# Patient Record
Sex: MALE | Race: WHITE | Employment: FULL TIME | URBAN - METROPOLITAN AREA
[De-identification: names, ages, dates, MRNs, and addresses within clinical notes are randomized per-mention and may not be internally consistent; named-entity substitution may affect disease eponyms.]

---

## 2017-02-08 ENCOUNTER — HOSPITAL ENCOUNTER (OUTPATIENT)
Dept: GENERAL RADIOLOGY | Age: 72
Discharge: HOME | End: 2017-02-08
Attending: INTERNAL MEDICINE
Payer: MEDICARE

## 2017-02-08 DIAGNOSIS — G89.29 CHRONIC BILATERAL LOW BACK PAIN WITHOUT SCIATICA: ICD-10-CM

## 2017-02-08 DIAGNOSIS — M54.50 CHRONIC BILATERAL LOW BACK PAIN WITHOUT SCIATICA: ICD-10-CM

## 2017-02-08 PROCEDURE — 72100 X-RAY EXAM L-S SPINE 2/3 VWS: CPT

## 2017-08-16 PROBLEM — R73.02 GLUCOSE INTOLERANCE (IMPAIRED GLUCOSE TOLERANCE): Status: ACTIVE | Noted: 2017-08-16

## 2020-03-02 ENCOUNTER — HOSPITAL ENCOUNTER (OUTPATIENT)
Dept: GENERAL RADIOLOGY | Age: 75
Discharge: HOME | End: 2020-03-02
Attending: INTERNAL MEDICINE
Payer: MEDICARE

## 2020-03-02 DIAGNOSIS — M54.2 NECK PAIN: ICD-10-CM

## 2020-03-02 PROCEDURE — 72050 X-RAY EXAM NECK SPINE 4/5VWS: CPT

## 2020-11-05 ENCOUNTER — HOSPITAL ENCOUNTER (OUTPATIENT)
Dept: NON INVASIVE DIAGNOSTICS | Age: 75
Discharge: HOME | End: 2020-11-05
Attending: PHYSICIAN ASSISTANT
Payer: MEDICARE

## 2020-11-05 DIAGNOSIS — R01.1 NEWLY RECOGNIZED MURMUR: ICD-10-CM

## 2020-11-05 DIAGNOSIS — R60.0 BILATERAL LEG EDEMA: ICD-10-CM

## 2020-11-05 LAB
ECHO AR MAX VEL PISA: 380.65 CM/S
ECHO AV AREA PEAK VELOCITY: 0.7 CM2
ECHO AV AREA PEAK VELOCITY: 49.46 CM2
ECHO AV AREA VTI: 0.66 CM2
ECHO AV MEAN GRADIENT: 43.42 MMHG
ECHO AV PEAK GRADIENT: 0.02 MMHG
ECHO AV PEAK GRADIENT: 76.73 MMHG
ECHO AV PEAK VELOCITY: 437.97 CM/S
ECHO AV PEAK VELOCITY: 6.16 CM/S
ECHO AV REGURGITANT PHT: 514.8 MS
ECHO AV VTI: 102.35 CM
ECHO LA AREA 4C: 22.47 CM2
ECHO LA VOL 4C: 59.49 ML (ref 18–58)
ECHO LV INTERNAL DIMENSION DIASTOLIC: 4.47 CM (ref 4.2–5.9)
ECHO LV INTERNAL DIMENSION SYSTOLIC: 4.34 CM
ECHO LV IVSD: 0.84 CM (ref 0.6–1)
ECHO LV MASS 2D: 127.5 G (ref 88–224)
ECHO LV POSTERIOR WALL DIASTOLIC: 0.92 CM (ref 0.6–1)
ECHO LVOT DIAM: 1.95 CM
ECHO LVOT PEAK GRADIENT: 4.15 MMHG
ECHO LVOT PEAK VELOCITY: 101.88 CM/S
ECHO LVOT SV: 67.4 ML
ECHO LVOT VTI: 22.51 CM
ECHO MV A VELOCITY: 80.95 CM/S
ECHO MV E VELOCITY: 76.8 CM/S
ECHO MV E/A RATIO: 0.95
ECHO RV TAPSE: 1.69 CM (ref 1.5–2)

## 2020-11-05 PROCEDURE — 93306 TTE W/DOPPLER COMPLETE: CPT

## 2021-03-08 PROBLEM — I35.0 NONRHEUMATIC AORTIC VALVE STENOSIS: Status: ACTIVE | Noted: 2021-03-08

## 2021-09-10 PROBLEM — E11.49 TYPE 2 DIABETES MELLITUS WITH NEUROLOGIC COMPLICATION, WITHOUT LONG-TERM CURRENT USE OF INSULIN (HCC): Status: ACTIVE | Noted: 2021-09-10

## 2021-09-10 PROBLEM — D68.69 SECONDARY HYPERCOAGULABLE STATE (HCC): Status: ACTIVE | Noted: 2021-09-10

## 2021-09-10 PROBLEM — R73.02 GLUCOSE INTOLERANCE (IMPAIRED GLUCOSE TOLERANCE): Status: RESOLVED | Noted: 2017-08-16 | Resolved: 2021-09-10

## 2021-09-10 PROBLEM — D69.2 SENILE PURPURA (HCC): Status: ACTIVE | Noted: 2021-09-10

## 2022-03-18 PROBLEM — D69.2 SENILE PURPURA (HCC): Status: ACTIVE | Noted: 2021-09-10

## 2022-03-19 PROBLEM — E11.49 TYPE 2 DIABETES MELLITUS WITH NEUROLOGIC COMPLICATION, WITHOUT LONG-TERM CURRENT USE OF INSULIN (HCC): Status: ACTIVE | Noted: 2021-09-10

## 2022-03-19 PROBLEM — I35.0 NONRHEUMATIC AORTIC VALVE STENOSIS: Status: ACTIVE | Noted: 2021-03-08

## 2022-03-20 PROBLEM — D68.69 SECONDARY HYPERCOAGULABLE STATE (HCC): Status: ACTIVE | Noted: 2021-09-10

## 2022-09-28 PROBLEM — I95.1 ORTHOSTASIS: Status: ACTIVE | Noted: 2022-09-28

## 2022-12-09 ENCOUNTER — HOSPITAL ENCOUNTER (OUTPATIENT)
Dept: INTERVENTIONAL RADIOLOGY/VASCULAR | Age: 77
Discharge: HOME | End: 2022-12-09
Attending: RADIOLOGY | Admitting: RADIOLOGY
Payer: MEDICARE

## 2022-12-09 VITALS
HEIGHT: 71 IN | SYSTOLIC BLOOD PRESSURE: 173 MMHG | TEMPERATURE: 98.2 F | RESPIRATION RATE: 14 BRPM | WEIGHT: 180 LBS | OXYGEN SATURATION: 95 % | DIASTOLIC BLOOD PRESSURE: 93 MMHG | BODY MASS INDEX: 25.2 KG/M2 | HEART RATE: 80 BPM

## 2022-12-09 DIAGNOSIS — T14.8XXA WOUND INFECTION: ICD-10-CM

## 2022-12-09 DIAGNOSIS — L08.9 WOUND INFECTION: ICD-10-CM

## 2022-12-09 PROCEDURE — C1751 CATH, INF, PER/CENT/MIDLINE: HCPCS

## 2022-12-09 PROCEDURE — 74011000250 HC RX REV CODE- 250: Performed by: RADIOLOGY

## 2022-12-09 PROCEDURE — 36573 INSJ PICC RS&I 5 YR+: CPT

## 2022-12-09 PROCEDURE — 2709999900 HC NON-CHARGEABLE SUPPLY

## 2022-12-09 RX ORDER — LIDOCAINE HYDROCHLORIDE 10 MG/ML
20 INJECTION INFILTRATION; PERINEURAL
Status: COMPLETED | OUTPATIENT
Start: 2022-12-09 | End: 2022-12-09

## 2022-12-09 RX ADMIN — LIDOCAINE HYDROCHLORIDE 1 ML: 10 INJECTION, SOLUTION INFILTRATION; PERINEURAL at 12:03

## 2022-12-09 NOTE — DISCHARGE INSTRUCTIONS
Side effects of sedation medications and other medications used today have been reviewed. Notify us of nausea, itching, hives, dizziness, or anything else out of the ordinary. Should you experience any of these significant changes, please call [phone] between the hours of 7:30 am and 10 pm or [phone] after hours. After hours, ask the  to page the X-ray Technologist, and describe the problem to the technologist.       Peripherally Inserted Central Catheter (PICC): Care Instructions  Overview     A peripherally inserted central catheter (PICC) is a soft, flexible tube that runs under your skin from a vein in your arm to a large vein near your heart. One end of the catheter stays outside your body. It is a type of central vascular access device, or central line. You may have it for weeks or months. A PICC is used to give you medicine, blood products, nutrients, or fluids. A PICC makes doing these things more comfortable for you because they are put directly into the catheter. So you will not be stuck with a needle every time. A PICC may be used to draw blood for tests only if another vein, such as in the hand or arm, can't be used. The end of the PICC sometimes has two or three openings so that you can get more than one type of fluid or medicine at a time. Your doctor may give you medicine to make you feel relaxed. You may feel a little pain when your doctor numbs your arm. Your doctor will then thread the catheter up a vein in your arm to a larger vein. You will not feel any pain. The doctor may use stitches or other devices to hold the catheter in place where it exits your arm. After the procedure, the site may be sore for a day or two. Follow-up care is a key part of your treatment and safety. Be sure to make and go to all appointments, and call your doctor if you are having problems. It's also a good idea to know your test results and keep a list of the medicines you take.   How can you care for yourself at home? Do not wear jewelry, such as necklaces, that can catch on the catheter. If the catheter breaks, follow the instructions your doctor gave you. If you have no instructions, clamp or tie off the catheter. Then see a doctor as soon as possible. To help prevent infection, take a shower instead of a bath. Do not go swimming with the catheter. Try to keep the area dry. When you shower, cover the area with waterproof material, such as plastic wrap. Never touch the open end of the catheter if the cap is off. Never use scissors, knives, pins, or other sharp objects near the catheter or other tubing. If your catheter has a clamp, keep it clamped when you are not using it. Fasten or tape the catheter to your body to prevent pulling or dangling. Avoid clothing that rubs or pulls on your catheter. Avoid bending or crimping your catheter. Always wash your hands before you touch your catheter. Wear loose clothing over the catheter for the first 10 to 14 days. When getting dressed, be careful not to pull on the catheter. How to change the dressing  Since the PICC is in one of your arms, you won't be able to change the dressing on your own. You'll need someone to help you change the dressing using the same instructions that your doctor or nurse gave you. Your PICC dressing should be changed at least once a week. If the dressing gets loose, wet, or dirty, it must be changed more often to prevent infection. Your doctor may also give you instructions for when to change the dressing. Be sure you have all your supplies ready. These include medical tape, a surgical mask, sterile gloves, and your dressing kit. The names and brands of the items will vary. Your doctor or nurse may give you specific instructions for changing the dressing. Here are basic tips for how to change the dressing. Wash your hands with soap and water. Do this for 15 seconds. Dry them with paper towels.   Put on the surgical mask.  Loosen and remove your old dressing. Peel it toward the PICC, not away from it. You may need to use an adhesive remover if it doesn't come off easily. Look at the site carefully for redness, swelling, drainage, tenderness, or warmth. If you notice any of these, call your doctor. Wash your hands again. Open your dressing kit, and put on the sterile gloves. Clean the site with the supplies in the dressing kit. Use the dressing that your doctor gave you, and place it over the site. Tape the PICC tubing to your skin. Make sure it doesn't dangle or pull. When should you call for help? Call 911 anytime you think you may need emergency care. For example, call if:    You passed out (lost consciousness). You have severe trouble breathing. You have sudden chest pain and shortness of breath, or you cough up blood. You have a fast or uneven pulse. Call your doctor now or seek immediate medical care if:    You have signs of infection, such as: Increased pain, swelling, warmth, or redness. Red streaks leading from the area. Pus or blood draining from the area. A fever. You have swelling in your face, chest, neck, or arm on the side where the catheter is. You have signs of a blood clot, such as bulging veins near the catheter. Your catheter is leaking, cracked, or clogged. You feel resistance when you inject medicine or fluids into your catheter. Your catheter is out of place. This may happen after severe coughing or vomiting, or if you pull on the catheter. You have chest pain or shortness of breath. Watch closely for changes in your health, and be sure to contact your doctor if:    You have any concerns about your catheter. Where can you learn more? Go to http://www.gray.com/  Enter L935 in the search box to learn more about \"Peripherally Inserted Central Catheter (PICC): Care Instructions. \"  Current as of: April 5, 2022 Content Version: 13.4  © 3466-2155 [employer]. Care instructions adapted under license by [employer] (which disclaims liability or warranty for this information). If you have questions about a medical condition or this instruction, always ask your healthcare professional. [address] any warranty or liability for your use of this information.

## 2022-12-09 NOTE — ROUTINE PROCESS
Pt arrives ambulatory to angio department accompanied by son for PICC procedure. All assessments completed and consent was reviewed. Education given was regarding procedure,  post-procedure care and  management/follow-up. Opportunity for questions was provided and all questions and concerns were addressed.

## 2022-12-09 NOTE — PROGRESS NOTES
1115 Assumed care of patient in Angio from Andreia Culver, [address] Rosendo Low placed a 5 fr 39 cm Power PICC in right upper arm. Patient ambulates to bathroom and dresses self without assistance. Stockinette applied and extra tegaderm , stockinette, strip of PureHubs provided. Reviewed discharge instructions and copy given. Instructed to follow up with Dr Mello's office for [address] [facility] dressing changes. Transported to discharge area for son to drive home.

## 2023-05-17 RX ORDER — ROSUVASTATIN CALCIUM 10 MG/1
1 TABLET, COATED ORAL NIGHTLY
Status: ON HOLD | COMMUNITY
Start: 2022-06-07

## 2023-05-17 RX ORDER — FUROSEMIDE 20 MG/1
1 TABLET ORAL DAILY
COMMUNITY
Start: 2021-05-31 | End: 2023-06-15

## 2023-05-17 RX ORDER — TRIAMCINOLONE ACETONIDE 1 MG/G
CREAM TOPICAL 2 TIMES DAILY PRN
COMMUNITY
Start: 2022-12-27 | End: 2023-07-18

## 2023-05-17 RX ORDER — NORTRIPTYLINE HYDROCHLORIDE 50 MG/1
1 CAPSULE ORAL NIGHTLY
Status: ON HOLD | COMMUNITY
Start: 2023-01-24

## 2023-05-17 RX ORDER — GABAPENTIN 600 MG/1
TABLET ORAL
Status: ON HOLD | COMMUNITY
Start: 2023-02-19

## 2023-05-17 RX ORDER — VITAMIN B COMPLEX
2500 TABLET ORAL DAILY
Status: ON HOLD | COMMUNITY

## 2023-05-17 RX ORDER — METOPROLOL SUCCINATE 50 MG/1
1 TABLET, EXTENDED RELEASE ORAL DAILY
Status: ON HOLD | COMMUNITY
Start: 2022-01-31

## 2023-05-17 RX ORDER — POTASSIUM CHLORIDE 750 MG/1
1 TABLET, EXTENDED RELEASE ORAL DAILY
COMMUNITY
Start: 2022-11-01 | End: 2023-06-15 | Stop reason: ALTCHOICE

## 2023-05-17 RX ORDER — ALLOPURINOL 100 MG/1
100 TABLET ORAL DAILY
Status: ON HOLD | COMMUNITY
Start: 2023-03-26

## 2023-05-17 RX ORDER — MONTELUKAST SODIUM 10 MG/1
TABLET ORAL
Status: ON HOLD | COMMUNITY
Start: 2022-10-13

## 2023-07-18 ENCOUNTER — HOSPITAL ENCOUNTER (OUTPATIENT)
Facility: HOSPITAL | Age: 78
Discharge: HOME | End: 2023-07-21
Payer: MEDICARE

## 2023-07-18 VITALS
WEIGHT: 202 LBS | SYSTOLIC BLOOD PRESSURE: 139 MMHG | BODY MASS INDEX: 28.28 KG/M2 | OXYGEN SATURATION: 96 % | DIASTOLIC BLOOD PRESSURE: 66 MMHG | TEMPERATURE: 97.1 F | RESPIRATION RATE: 16 BRPM | HEART RATE: 111 BPM | HEIGHT: 71 IN

## 2023-07-18 LAB
ANION GAP SERPL CALC-SCNC: 5 MMOL/L (ref 5–15)
BASOPHILS # BLD: 0.1 K/UL (ref 0–0.1)
BASOPHILS NFR BLD: 1 % (ref 0–1)
BUN SERPL-MCNC: 17 MG/DL (ref 6–20)
BUN/CREAT SERPL: 20 (ref 12–20)
CALCIUM SERPL-MCNC: 9.8 MG/DL (ref 8.5–10.1)
CHLORIDE SERPL-SCNC: 101 MMOL/L (ref 97–108)
CO2 SERPL-SCNC: 33 MMOL/L (ref 21–32)
CREAT SERPL-MCNC: 0.84 MG/DL (ref 0.7–1.3)
DIFFERENTIAL METHOD BLD: ABNORMAL
EKG ATRIAL RATE: 101 BPM
EKG DIAGNOSIS: NORMAL
EKG P AXIS: 52 DEGREES
EKG P-R INTERVAL: 156 MS
EKG Q-T INTERVAL: 340 MS
EKG QRS DURATION: 84 MS
EKG QTC CALCULATION (BAZETT): 440 MS
EKG R AXIS: 59 DEGREES
EKG T AXIS: 71 DEGREES
EKG VENTRICULAR RATE: 101 BPM
EOSINOPHIL # BLD: 0.1 K/UL (ref 0–0.4)
EOSINOPHIL NFR BLD: 1 % (ref 0–7)
ERYTHROCYTE [DISTWIDTH] IN BLOOD BY AUTOMATED COUNT: 13.4 % (ref 11.5–14.5)
GLUCOSE SERPL-MCNC: 136 MG/DL (ref 65–100)
HCT VFR BLD AUTO: 42.9 % (ref 36.6–50.3)
HGB BLD-MCNC: 13.5 G/DL (ref 12.1–17)
IMM GRANULOCYTES # BLD AUTO: 0.1 K/UL (ref 0–0.04)
IMM GRANULOCYTES NFR BLD AUTO: 1 % (ref 0–0.5)
INR PPP: 1.2 (ref 0.9–1.1)
LYMPHOCYTES # BLD: 1.4 K/UL (ref 0.8–3.5)
LYMPHOCYTES NFR BLD: 14 % (ref 12–49)
MCH RBC QN AUTO: 30.3 PG (ref 26–34)
MCHC RBC AUTO-ENTMCNC: 31.5 G/DL (ref 30–36.5)
MCV RBC AUTO: 96.4 FL (ref 80–99)
MONOCYTES # BLD: 1.3 K/UL (ref 0–1)
MONOCYTES NFR BLD: 13 % (ref 5–13)
NEUTS SEG # BLD: 7 K/UL (ref 1.8–8)
NEUTS SEG NFR BLD: 70 % (ref 32–75)
NRBC # BLD: 0 K/UL (ref 0–0.01)
NRBC BLD-RTO: 0 PER 100 WBC
PLATELET # BLD AUTO: 241 K/UL (ref 150–400)
PMV BLD AUTO: 10.9 FL (ref 8.9–12.9)
POTASSIUM SERPL-SCNC: 4 MMOL/L (ref 3.5–5.1)
PROTHROMBIN TIME: 12.1 SEC (ref 9–11.1)
RBC # BLD AUTO: 4.45 M/UL (ref 4.1–5.7)
SODIUM SERPL-SCNC: 139 MMOL/L (ref 136–145)
WBC # BLD AUTO: 9.9 K/UL (ref 4.1–11.1)

## 2023-07-18 PROCEDURE — 36415 COLL VENOUS BLD VENIPUNCTURE: CPT

## 2023-07-18 PROCEDURE — 85610 PROTHROMBIN TIME: CPT

## 2023-07-18 PROCEDURE — 93010 ELECTROCARDIOGRAM REPORT: CPT | Performed by: SPECIALIST

## 2023-07-18 PROCEDURE — 93005 ELECTROCARDIOGRAM TRACING: CPT | Performed by: ORTHOPAEDIC SURGERY

## 2023-07-18 PROCEDURE — 80048 BASIC METABOLIC PNL TOTAL CA: CPT

## 2023-07-18 PROCEDURE — 85025 COMPLETE CBC W/AUTO DIFF WBC: CPT

## 2023-07-18 RX ORDER — THIAMINE MONONITRATE (VIT B1) 100 MG
100 TABLET ORAL DAILY
Status: ON HOLD | COMMUNITY

## 2023-07-18 RX ORDER — M-VIT,TX,IRON,MINS/CALC/FOLIC 27MG-0.4MG
1 TABLET ORAL DAILY
Status: ON HOLD | COMMUNITY

## 2023-07-18 RX ORDER — ASPIRIN 81 MG/1
81 TABLET ORAL DAILY
Status: ON HOLD | COMMUNITY

## 2023-07-18 NOTE — PERIOP NOTE
Pt has been given instructions for Xarelto and Aspirin per PCP and Dr. Stacey Balbuena. Pt states he takes Metoprolol for tachycardia, and has been taking it for a while. Pt and wife deny hx of diabetes.

## 2023-07-20 ENCOUNTER — ANESTHESIA (OUTPATIENT)
Facility: HOSPITAL | Age: 78
End: 2023-07-20
Payer: MEDICARE

## 2023-07-20 ENCOUNTER — ANESTHESIA EVENT (OUTPATIENT)
Facility: HOSPITAL | Age: 78
End: 2023-07-20
Payer: MEDICARE

## 2023-07-20 ENCOUNTER — APPOINTMENT (OUTPATIENT)
Facility: HOSPITAL | Age: 78
DRG: 492 | End: 2023-07-20
Attending: ORTHOPAEDIC SURGERY
Payer: MEDICARE

## 2023-07-20 ENCOUNTER — HOSPITAL ENCOUNTER (INPATIENT)
Facility: HOSPITAL | Age: 78
LOS: 5 days | Discharge: HOME | DRG: 492 | End: 2023-07-25
Attending: ORTHOPAEDIC SURGERY | Admitting: ORTHOPAEDIC SURGERY
Payer: MEDICARE

## 2023-07-20 DIAGNOSIS — S82.891D ANKLE FRACTURE, RIGHT, CLOSED, WITH ROUTINE HEALING, SUBSEQUENT ENCOUNTER: ICD-10-CM

## 2023-07-20 DIAGNOSIS — I38 CARDIAC VALVULOPATHY: Primary | ICD-10-CM

## 2023-07-20 PROCEDURE — 6360000002 HC RX W HCPCS: Performed by: ANESTHESIOLOGY

## 2023-07-20 PROCEDURE — 2709999900 HC NON-CHARGEABLE SUPPLY: Performed by: ORTHOPAEDIC SURGERY

## 2023-07-20 PROCEDURE — 1100000000 HC RM PRIVATE

## 2023-07-20 PROCEDURE — 6360000002 HC RX W HCPCS: Performed by: ORTHOPAEDIC SURGERY

## 2023-07-20 PROCEDURE — 2720000010 HC SURG SUPPLY STERILE: Performed by: ORTHOPAEDIC SURGERY

## 2023-07-20 PROCEDURE — 2500000003 HC RX 250 WO HCPCS: Performed by: NURSE ANESTHETIST, CERTIFIED REGISTERED

## 2023-07-20 PROCEDURE — HZ2ZZZZ DETOXIFICATION SERVICES FOR SUBSTANCE ABUSE TREATMENT: ICD-10-PCS | Performed by: ORTHOPAEDIC SURGERY

## 2023-07-20 PROCEDURE — 3700000000 HC ANESTHESIA ATTENDED CARE: Performed by: ORTHOPAEDIC SURGERY

## 2023-07-20 PROCEDURE — 6360000002 HC RX W HCPCS: Performed by: NURSE ANESTHETIST, CERTIFIED REGISTERED

## 2023-07-20 PROCEDURE — 2580000003 HC RX 258: Performed by: ORTHOPAEDIC SURGERY

## 2023-07-20 PROCEDURE — C1713 ANCHOR/SCREW BN/BN,TIS/BN: HCPCS | Performed by: ORTHOPAEDIC SURGERY

## 2023-07-20 PROCEDURE — 64447 NJX AA&/STRD FEMORAL NRV IMG: CPT | Performed by: ANESTHESIOLOGY

## 2023-07-20 PROCEDURE — 0QSJ04Z REPOSITION RIGHT FIBULA WITH INTERNAL FIXATION DEVICE, OPEN APPROACH: ICD-10-PCS | Performed by: ORTHOPAEDIC SURGERY

## 2023-07-20 PROCEDURE — 3700000001 HC ADD 15 MINUTES (ANESTHESIA): Performed by: ORTHOPAEDIC SURGERY

## 2023-07-20 PROCEDURE — 6370000000 HC RX 637 (ALT 250 FOR IP): Performed by: ORTHOPAEDIC SURGERY

## 2023-07-20 PROCEDURE — 7100000001 HC PACU RECOVERY - ADDTL 15 MIN: Performed by: ORTHOPAEDIC SURGERY

## 2023-07-20 PROCEDURE — 3600000014 HC SURGERY LEVEL 4 ADDTL 15MIN: Performed by: ORTHOPAEDIC SURGERY

## 2023-07-20 PROCEDURE — 2500000003 HC RX 250 WO HCPCS: Performed by: ORTHOPAEDIC SURGERY

## 2023-07-20 PROCEDURE — 2580000003 HC RX 258: Performed by: ANESTHESIOLOGY

## 2023-07-20 PROCEDURE — 0SSF04Z REPOSITION RIGHT ANKLE JOINT WITH INTERNAL FIXATION DEVICE, OPEN APPROACH: ICD-10-PCS | Performed by: ORTHOPAEDIC SURGERY

## 2023-07-20 PROCEDURE — 7100000000 HC PACU RECOVERY - FIRST 15 MIN: Performed by: ORTHOPAEDIC SURGERY

## 2023-07-20 PROCEDURE — 3600000004 HC SURGERY LEVEL 4 BASE: Performed by: ORTHOPAEDIC SURGERY

## 2023-07-20 DEVICE — DISTAL LATERAL FIBULA PLATE, 6 HOLE
Type: IMPLANTABLE DEVICE | Site: ANKLE | Status: FUNCTIONAL
Brand: VARIAX

## 2023-07-20 DEVICE — LOCKING SCREW
Type: IMPLANTABLE DEVICE | Site: ANKLE | Status: FUNCTIONAL
Brand: VARIAX

## 2023-07-20 DEVICE — ALLOGRAFT BNE 10CC MTRX VIABLE BIO4: Type: IMPLANTABLE DEVICE | Site: ANKLE | Status: FUNCTIONAL

## 2023-07-20 DEVICE — KIT REP TI KNOTLESS SYNDESMOSIS CANN 3.7MM DRL BIT DRL GUID: Type: IMPLANTABLE DEVICE | Site: ANKLE | Status: FUNCTIONAL

## 2023-07-20 DEVICE — BONE SCREW
Type: IMPLANTABLE DEVICE | Site: ANKLE | Status: FUNCTIONAL
Brand: VARIAX

## 2023-07-20 RX ORDER — LIDOCAINE HYDROCHLORIDE 10 MG/ML
1 INJECTION, SOLUTION EPIDURAL; INFILTRATION; INTRACAUDAL; PERINEURAL
Status: DISCONTINUED | OUTPATIENT
Start: 2023-07-20 | End: 2023-07-20 | Stop reason: HOSPADM

## 2023-07-20 RX ORDER — MORPHINE SULFATE 2 MG/ML
2 INJECTION, SOLUTION INTRAMUSCULAR; INTRAVENOUS
Status: DISCONTINUED | OUTPATIENT
Start: 2023-07-20 | End: 2023-07-22

## 2023-07-20 RX ORDER — FENTANYL CITRATE 50 UG/ML
INJECTION, SOLUTION INTRAMUSCULAR; INTRAVENOUS PRN
Status: DISCONTINUED | OUTPATIENT
Start: 2023-07-20 | End: 2023-07-20 | Stop reason: SDUPTHER

## 2023-07-20 RX ORDER — SODIUM CHLORIDE, SODIUM LACTATE, POTASSIUM CHLORIDE, CALCIUM CHLORIDE 600; 310; 30; 20 MG/100ML; MG/100ML; MG/100ML; MG/100ML
INJECTION, SOLUTION INTRAVENOUS CONTINUOUS
Status: DISCONTINUED | OUTPATIENT
Start: 2023-07-20 | End: 2023-07-21

## 2023-07-20 RX ORDER — MONTELUKAST SODIUM 10 MG/1
10 TABLET ORAL NIGHTLY
Status: DISCONTINUED | OUTPATIENT
Start: 2023-07-20 | End: 2023-07-25 | Stop reason: HOSPADM

## 2023-07-20 RX ORDER — DIPHENHYDRAMINE HYDROCHLORIDE 50 MG/ML
12.5 INJECTION INTRAMUSCULAR; INTRAVENOUS
Status: COMPLETED | OUTPATIENT
Start: 2023-07-20 | End: 2023-07-21

## 2023-07-20 RX ORDER — M-VIT,TX,IRON,MINS/CALC/FOLIC 27MG-0.4MG
1 TABLET ORAL DAILY
Status: DISCONTINUED | OUTPATIENT
Start: 2023-07-20 | End: 2023-07-20 | Stop reason: SDUPTHER

## 2023-07-20 RX ORDER — GABAPENTIN 300 MG/1
300 CAPSULE ORAL ONCE
Status: COMPLETED | OUTPATIENT
Start: 2023-07-20 | End: 2023-07-20

## 2023-07-20 RX ORDER — NORTRIPTYLINE HYDROCHLORIDE 25 MG/1
50 CAPSULE ORAL NIGHTLY
Status: DISCONTINUED | OUTPATIENT
Start: 2023-07-20 | End: 2023-07-25 | Stop reason: HOSPADM

## 2023-07-20 RX ORDER — GABAPENTIN 300 MG/1
600 CAPSULE ORAL 3 TIMES DAILY
Status: DISCONTINUED | OUTPATIENT
Start: 2023-07-20 | End: 2023-07-25 | Stop reason: HOSPADM

## 2023-07-20 RX ORDER — ONDANSETRON 2 MG/ML
4 INJECTION INTRAMUSCULAR; INTRAVENOUS EVERY 6 HOURS PRN
Status: DISCONTINUED | OUTPATIENT
Start: 2023-07-20 | End: 2023-07-25 | Stop reason: HOSPADM

## 2023-07-20 RX ORDER — BISACODYL 5 MG/1
5 TABLET, DELAYED RELEASE ORAL DAILY PRN
Status: DISCONTINUED | OUTPATIENT
Start: 2023-07-20 | End: 2023-07-25 | Stop reason: HOSPADM

## 2023-07-20 RX ORDER — FENTANYL CITRATE 50 UG/ML
100 INJECTION, SOLUTION INTRAMUSCULAR; INTRAVENOUS
Status: DISCONTINUED | OUTPATIENT
Start: 2023-07-20 | End: 2023-07-20 | Stop reason: HOSPADM

## 2023-07-20 RX ORDER — MORPHINE SULFATE 4 MG/ML
4 INJECTION, SOLUTION INTRAMUSCULAR; INTRAVENOUS
Status: DISCONTINUED | OUTPATIENT
Start: 2023-07-20 | End: 2023-07-22

## 2023-07-20 RX ORDER — METOPROLOL SUCCINATE 50 MG/1
50 TABLET, EXTENDED RELEASE ORAL DAILY
Status: DISCONTINUED | OUTPATIENT
Start: 2023-07-21 | End: 2023-07-22

## 2023-07-20 RX ORDER — SODIUM CHLORIDE 0.9 % (FLUSH) 0.9 %
5-40 SYRINGE (ML) INJECTION PRN
Status: DISCONTINUED | OUTPATIENT
Start: 2023-07-20 | End: 2023-07-25 | Stop reason: HOSPADM

## 2023-07-20 RX ORDER — MULTIVIT/IRON SULF/FOLIC ACID 15MG-0.4MG
1 TABLET ORAL DAILY
Status: DISCONTINUED | OUTPATIENT
Start: 2023-07-20 | End: 2023-07-25 | Stop reason: HOSPADM

## 2023-07-20 RX ORDER — SODIUM CHLORIDE, SODIUM LACTATE, POTASSIUM CHLORIDE, CALCIUM CHLORIDE 600; 310; 30; 20 MG/100ML; MG/100ML; MG/100ML; MG/100ML
INJECTION, SOLUTION INTRAVENOUS CONTINUOUS
Status: DISCONTINUED | OUTPATIENT
Start: 2023-07-20 | End: 2023-07-20 | Stop reason: HOSPADM

## 2023-07-20 RX ORDER — ROSUVASTATIN CALCIUM 10 MG/1
10 TABLET, COATED ORAL NIGHTLY
Status: DISCONTINUED | OUTPATIENT
Start: 2023-07-20 | End: 2023-07-25 | Stop reason: HOSPADM

## 2023-07-20 RX ORDER — SODIUM CHLORIDE 9 MG/ML
INJECTION, SOLUTION INTRAVENOUS PRN
Status: DISCONTINUED | OUTPATIENT
Start: 2023-07-20 | End: 2023-07-25 | Stop reason: HOSPADM

## 2023-07-20 RX ORDER — PROPOFOL 10 MG/ML
INJECTION, EMULSION INTRAVENOUS CONTINUOUS PRN
Status: DISCONTINUED | OUTPATIENT
Start: 2023-07-20 | End: 2023-07-20 | Stop reason: SDUPTHER

## 2023-07-20 RX ORDER — ALLOPURINOL 100 MG/1
100 TABLET ORAL DAILY
Status: DISCONTINUED | OUTPATIENT
Start: 2023-07-21 | End: 2023-07-25 | Stop reason: HOSPADM

## 2023-07-20 RX ORDER — GAUZE BANDAGE 2" X 2"
100 BANDAGE TOPICAL DAILY
Status: DISCONTINUED | OUTPATIENT
Start: 2023-07-21 | End: 2023-07-25 | Stop reason: HOSPADM

## 2023-07-20 RX ORDER — CHOLECALCIFEROL (VITAMIN D3) 125 MCG
2500 CAPSULE ORAL DAILY
Status: DISCONTINUED | OUTPATIENT
Start: 2023-07-20 | End: 2023-07-25 | Stop reason: HOSPADM

## 2023-07-20 RX ORDER — SODIUM CHLORIDE 0.9 % (FLUSH) 0.9 %
5-40 SYRINGE (ML) INJECTION EVERY 12 HOURS SCHEDULED
Status: DISCONTINUED | OUTPATIENT
Start: 2023-07-20 | End: 2023-07-25 | Stop reason: HOSPADM

## 2023-07-20 RX ORDER — ONDANSETRON 2 MG/ML
4 INJECTION INTRAMUSCULAR; INTRAVENOUS
Status: DISCONTINUED | OUTPATIENT
Start: 2023-07-20 | End: 2023-07-21 | Stop reason: HOSPADM

## 2023-07-20 RX ORDER — PHENYLEPHRINE HCL IN 0.9% NACL 0.4MG/10ML
SYRINGE (ML) INTRAVENOUS PRN
Status: DISCONTINUED | OUTPATIENT
Start: 2023-07-20 | End: 2023-07-20 | Stop reason: SDUPTHER

## 2023-07-20 RX ORDER — POTASSIUM CHLORIDE 750 MG/1
10 TABLET, FILM COATED, EXTENDED RELEASE ORAL DAILY
Status: DISCONTINUED | OUTPATIENT
Start: 2023-07-21 | End: 2023-07-25 | Stop reason: HOSPADM

## 2023-07-20 RX ORDER — DEXMEDETOMIDINE HYDROCHLORIDE 100 UG/ML
INJECTION, SOLUTION INTRAVENOUS PRN
Status: DISCONTINUED | OUTPATIENT
Start: 2023-07-20 | End: 2023-07-20 | Stop reason: SDUPTHER

## 2023-07-20 RX ORDER — ACETAMINOPHEN 325 MG/1
650 TABLET ORAL EVERY 4 HOURS PRN
Status: DISCONTINUED | OUTPATIENT
Start: 2023-07-20 | End: 2023-07-25 | Stop reason: HOSPADM

## 2023-07-20 RX ORDER — ASPIRIN 81 MG/1
81 TABLET ORAL DAILY
Status: DISCONTINUED | OUTPATIENT
Start: 2023-07-20 | End: 2023-07-25 | Stop reason: HOSPADM

## 2023-07-20 RX ORDER — OXYCODONE HYDROCHLORIDE 5 MG/1
5 TABLET ORAL EVERY 4 HOURS PRN
Status: DISCONTINUED | OUTPATIENT
Start: 2023-07-20 | End: 2023-07-22

## 2023-07-20 RX ORDER — BISACODYL 10 MG
10 SUPPOSITORY, RECTAL RECTAL DAILY PRN
Status: DISCONTINUED | OUTPATIENT
Start: 2023-07-20 | End: 2023-07-25 | Stop reason: HOSPADM

## 2023-07-20 RX ORDER — ACETAMINOPHEN 325 MG/1
650 TABLET ORAL ONCE
Status: COMPLETED | OUTPATIENT
Start: 2023-07-20 | End: 2023-07-20

## 2023-07-20 RX ORDER — FUROSEMIDE 20 MG/1
20 TABLET ORAL DAILY
Status: DISCONTINUED | OUTPATIENT
Start: 2023-07-21 | End: 2023-07-21

## 2023-07-20 RX ORDER — MIDAZOLAM HYDROCHLORIDE 2 MG/2ML
2 INJECTION, SOLUTION INTRAMUSCULAR; INTRAVENOUS
Status: DISCONTINUED | OUTPATIENT
Start: 2023-07-20 | End: 2023-07-20 | Stop reason: HOSPADM

## 2023-07-20 RX ORDER — MIDAZOLAM HYDROCHLORIDE 1 MG/ML
INJECTION INTRAMUSCULAR; INTRAVENOUS PRN
Status: DISCONTINUED | OUTPATIENT
Start: 2023-07-20 | End: 2023-07-20 | Stop reason: SDUPTHER

## 2023-07-20 RX ADMIN — FENTANYL CITRATE 50 MCG: 50 INJECTION, SOLUTION INTRAMUSCULAR; INTRAVENOUS at 10:35

## 2023-07-20 RX ADMIN — DEXMEDETOMIDINE 10 MCG: 100 INJECTION, SOLUTION INTRAVENOUS at 12:08

## 2023-07-20 RX ADMIN — NORTRIPTYLINE HYDROCHLORIDE 50 MG: 25 CAPSULE ORAL at 21:45

## 2023-07-20 RX ADMIN — MONTELUKAST 10 MG: 10 TABLET, FILM COATED ORAL at 21:47

## 2023-07-20 RX ADMIN — ROSUVASTATIN CALCIUM 10 MG: 10 TABLET, FILM COATED ORAL at 21:44

## 2023-07-20 RX ADMIN — GABAPENTIN 300 MG: 300 CAPSULE ORAL at 10:28

## 2023-07-20 RX ADMIN — ROPIVACAINE HYDROCHLORIDE 30 ML: 2 INJECTION, SOLUTION EPIDURAL; INFILTRATION at 10:40

## 2023-07-20 RX ADMIN — WATER 2000 MG: 1 INJECTION INTRAMUSCULAR; INTRAVENOUS; SUBCUTANEOUS at 21:18

## 2023-07-20 RX ADMIN — CEFAZOLIN SODIUM 2000 MG: 1 POWDER, FOR SOLUTION INTRAMUSCULAR; INTRAVENOUS at 12:10

## 2023-07-20 RX ADMIN — SODIUM CHLORIDE, POTASSIUM CHLORIDE, SODIUM LACTATE AND CALCIUM CHLORIDE: 600; 310; 30; 20 INJECTION, SOLUTION INTRAVENOUS at 10:29

## 2023-07-20 RX ADMIN — Medication 100 MCG: at 13:09

## 2023-07-20 RX ADMIN — PROPOFOL 125 MCG/KG/MIN: 10 INJECTION, EMULSION INTRAVENOUS at 12:03

## 2023-07-20 RX ADMIN — SODIUM CHLORIDE, POTASSIUM CHLORIDE, SODIUM LACTATE AND CALCIUM CHLORIDE: 600; 310; 30; 20 INJECTION, SOLUTION INTRAVENOUS at 15:01

## 2023-07-20 RX ADMIN — GABAPENTIN 600 MG: 300 CAPSULE ORAL at 19:04

## 2023-07-20 RX ADMIN — ACETAMINOPHEN 650 MG: 325 TABLET ORAL at 10:28

## 2023-07-20 RX ADMIN — Medication 1 TABLET: at 21:45

## 2023-07-20 RX ADMIN — CYANOCOBALAMIN TAB 500 MCG 2500 MCG: 500 TAB at 21:47

## 2023-07-20 RX ADMIN — FENTANYL CITRATE 50 MCG: 50 INJECTION, SOLUTION INTRAMUSCULAR; INTRAVENOUS at 12:03

## 2023-07-20 RX ADMIN — ACETAMINOPHEN 650 MG: 325 TABLET ORAL at 19:02

## 2023-07-20 RX ADMIN — SODIUM CHLORIDE, PRESERVATIVE FREE 10 ML: 5 INJECTION INTRAVENOUS at 21:48

## 2023-07-20 RX ADMIN — ROPIVACAINE HYDROCHLORIDE 10 ML: 2 INJECTION, SOLUTION EPIDURAL; INFILTRATION at 10:45

## 2023-07-20 RX ADMIN — Medication 100 MCG: at 12:48

## 2023-07-20 RX ADMIN — Medication 100 MCG: at 13:15

## 2023-07-20 RX ADMIN — MIDAZOLAM HYDROCHLORIDE 2 MG: 1 INJECTION, SOLUTION INTRAMUSCULAR; INTRAVENOUS at 10:35

## 2023-07-20 RX ADMIN — FENTANYL CITRATE 50 MCG: 50 INJECTION, SOLUTION INTRAMUSCULAR; INTRAVENOUS at 10:40

## 2023-07-20 ASSESSMENT — PAIN SCALES - GENERAL
PAINLEVEL_OUTOF10: 0
PAINLEVEL_OUTOF10: 0
PAINLEVEL_OUTOF10: 4
PAINLEVEL_OUTOF10: 2

## 2023-07-20 ASSESSMENT — PAIN DESCRIPTION - LOCATION: LOCATION: ANKLE

## 2023-07-20 ASSESSMENT — PAIN DESCRIPTION - ORIENTATION: ORIENTATION: RIGHT

## 2023-07-20 ASSESSMENT — PAIN - FUNCTIONAL ASSESSMENT
PAIN_FUNCTIONAL_ASSESSMENT: 0-10
PAIN_FUNCTIONAL_ASSESSMENT: ACTIVITIES ARE NOT PREVENTED

## 2023-07-20 ASSESSMENT — PAIN DESCRIPTION - DESCRIPTORS: DESCRIPTORS: ACHING;SORE
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
## 2023-07-20 NOTE — PERIOP NOTE
Patient expressed need to defecate,  he declined the use of a bed pan, patient advised against  weight bearing on the right lower extremity. Patient assisted to bedside commode, was able to pass stools and patient assisted back to bed with help.

## 2023-07-20 NOTE — H&P
The patient's History and Physical within 30 days of today's date  was reviewed with the patient and I examined the patient. There was no change. The surgical site was confirmed by the patient and me. Plan: The risks, benefits, expected outcome, and alternative to the recommended procedure have been discussed with the patient. Patient understands and wants to proceed with the procedure.  Full paper H&P on the chart    Electronically signed by Mushtaq Dai MD on 7/20/2023 at 10:41 AM
EMT/paramedic

## 2023-07-20 NOTE — ANESTHESIA PRE PROCEDURE
Counseling given: Not Answered      Vital Signs (Current): There were no vitals filed for this visit. BP Readings from Last 3 Encounters:   07/18/23 139/66   07/17/23 120/65   06/20/23 (!) 100/50       NPO Status:                                                                                 BMI:   Wt Readings from Last 3 Encounters:   07/18/23 91.6 kg (202 lb)   05/12/23 86.2 kg (190 lb)   03/21/23 88.5 kg (195 lb)     There is no height or weight on file to calculate BMI.    CBC:   Lab Results   Component Value Date/Time    WBC 9.9 07/18/2023 10:57 AM    RBC 4.45 07/18/2023 10:57 AM    HGB 13.5 07/18/2023 10:57 AM    HCT 42.9 07/18/2023 10:57 AM    MCV 96.4 07/18/2023 10:57 AM    MCV 96.3 08/25/2022 11:29 AM    RDW 13.4 07/18/2023 10:57 AM     07/18/2023 10:57 AM       CMP:   Lab Results   Component Value Date/Time     07/18/2023 10:57 AM    K 4.0 07/18/2023 10:57 AM     07/18/2023 10:57 AM    CO2 33 07/18/2023 10:57 AM    BUN 17 07/18/2023 10:57 AM    CREATININE 0.84 07/18/2023 10:57 AM    GFRAA 103 09/10/2021 10:38 AM    AGRATIO 1.7 06/15/2023 04:23 PM    LABGLOM >60 07/18/2023 10:57 AM    LABGLOM 71 06/15/2023 04:23 PM    GLUCOSE 136 07/18/2023 10:57 AM    GLUCOSE 148 06/15/2023 04:23 PM    PROT 6.0 06/15/2023 04:23 PM    CALCIUM 9.8 07/18/2023 10:57 AM    BILITOT 0.9 06/15/2023 04:23 PM    ALKPHOS 68 06/15/2023 04:23 PM    AST 52 06/15/2023 04:23 PM    ALT 46 06/15/2023 04:23 PM       POC Tests: No results for input(s): POCGLU, POCNA, POCK, POCCL, POCBUN, POCHEMO, POCHCT in the last 72 hours.     Coags:   Lab Results   Component Value Date/Time    PROTIME 12.1 07/18/2023 10:57 AM    INR 1.2 07/18/2023 10:57 AM       HCG (If Applicable): No results found for: PREGTESTUR, PREGSERUM, HCG, HCGQUANT     ABGs: No results found for: PHART, PO2ART, MAY8ZJQ, HJR7QVG, BEART, D0TRGBBQ     Type & Screen (If Applicable):  No results found for: LABABO,

## 2023-07-20 NOTE — OP NOTE
[address] [facility]  OPERATIVE REPORT    Name:  Cherelle Henry  MR#:  [id number]  :  1945  ACCOUNT #:  [de-identified]  DATE OF SERVICE:  2023    PREOPERATIVE DIAGNOSES:  1. Right lateral malleolus fracture. 2.  Right syndesmosis disruption. 3.  Right deltoid ligament tear. POSTOPERATIVE DIAGNOSES:  1. Right lateral malleolus fracture. 2.  Right syndesmosis disruption. 3.  Right deltoid ligament tear. PROCEDURES PERFORMED:  1. Open reduction internal fixation, right lateral malleolus fracture. 2.  Open reduction internal fixation, right syndesmosis. 3.  Independent interpretation of intraoperative fluoroscopy. SURGEON:  Gregory White. Bee Pereira MD    ASSISTANT:  Rosie Barba PA-C    ANESTHESIA:  Regional with sedation. COMPLICATIONS:  None. SPECIMENS REMOVED:  None. IMPLANTS:  [place] lateral plate and screws and Arthrex TightRope x2. ESTIMATED BLOOD LOSS:  Less than 5 mL. DRAINS:  None. FINDINGS:  Right Magaña B lateral malleolus fracture with right deltoid ligament tear and syndesmosis disruption. After the lateral malleolus fracture was reduced and fibular length was restored, the medial clear space was reduced and there was no soft tissue interposed in the medial gutter and thus the deltoid ligament did not need to be repaired primarily. TOURNIQUET TIME:  Esmarch calf tourniquet for 60 minutes. INDICATION FOR PROCEDURE:  This is a 73-year-old male, who suffered a right Magaña B lateral malleolus fracture with lateral shift of the talus and widening of the syndesmosis and medial clear space. This is an unstable injury. I offered both surgical and conservative management options to the patient.   I discussed the risks of the surgery which include but not limited to complications of anesthesia including death, pain, bleeding, infection, damage to surrounding structures, nonunion, malunion, DVT, PE, wound healing problems, the need for further surgery as clamped to the bone and the fracture was clamped into anatomic reduction. With this maneuver, this closed down the medial clear space and restored the ankle mortise. The plate was then affixed to the proximal fragment with three bicortical screws. To further stabilize the joint and because of questionable syndesmosis widening, a clamp was placed in the medial-to-lateral malleolus and the syndesmosis was reduced. Two holes were drilled from the fibula and the tibia parallel to the ankle joint through the plate across all four cortices of the tibia and the fibula. The syndesmosis TightRopes were then placed through the predrilled holes. Two small stab incisions were made on the medial aspect of the ankle to ensure that the buttons were placed directly down the bone and not caught up in soft tissue. With adequate positioning of the buttons, the TightRope devices were then tightened sequentially. The clamp was removed and the syndesmosis and ankle mortise was anatomic and stable. External rotation stress view showed no abnormal medial clear space or syndesmosis widening. Because of the comminution and some of the bone loss from the comminuted fracture fragment, Mena BIO4 was placed in the fracture site to facilitate healing. The wound was then copiously irrigated with normal saline and closed in layers. During the procedure, Paulina Patel PA-C performed the duties of positioning, retraction, assistance with limb and implant management as well as closure, dressing and splint application. After the wound was closed, sterile dressing was applied. The tourniquet was dropped and a well-padded, well-molded short-leg splint was applied to the right lower extremity and the patient was awakened from anesthesia and taken to the recovery room in hemodynamically stable condition. All lap and sharp counts were correct at the end of the case.     POSTOPERATIVE CARE:  The patient will be discharged home

## 2023-07-20 NOTE — BRIEF OP NOTE
Brief Postoperative Note      Patient: Nat Vega  YOB: 1945  MRN: [id number]    Date of Procedure: 7/20/2023    Pre-op Diagnosis  RIGHT lateral malleolus fracture  RIGHT syndesmosis disruption  RIGHT deltoid ligament tear    Post-Op Diagnosis: Same       Procedures:  Open reduction internal fixation RIGHT lateral malleolus fracture  Open reduction internal fixation RIGHT syndesmosis  INDEPENDENT INTERPRETATION OF INTRA-OPERATIVE FLUOROSCOPY      Surgeon(s):  Andrews Ponce. Oren De Luna MD    Assistant:  Physician Assistant: Carla Anderson PA-C  During the procedure Rosalia Lopez PA-C performed the duties of positioning, retraction, assistance with limb and implant management, closure and dressing application      Anesthesia: regional w/ sedation      Estimated Blood Loss (mL): less than 5cc    Complications: None    Specimens:   * No specimens in log *    Implants:  Implant Name Type Inv.  Item Serial No.  Lot No. LRB No. Used Action   TightRope Syndesmosis Repair   NA [employer]CR [id number] Right 2 Implanted   IMPLANT RECORD       1 Implanted     Mena lateral plate/screws  Mena Bio4  Arthrex tight ropes      Drains: * No LDAs found *    Findings: right Magaña B lateral malleolus fx with right deltoid ligament tear and syndesmosis disruption    TT: esmarch calf x 60 min    Dictation #: [id number]      Electronically signed by Bobby Webster MD on 7/20/2023 at 1:05 PM

## 2023-07-20 NOTE — ANESTHESIA POSTPROCEDURE EVALUATION
Department of Anesthesiology  Postprocedure Note    Patient: Mehnaz Alexander  MRN: [id number]  YOB: 1945  Date of evaluation: 7/20/2023      Procedure Summary     Date: 07/20/23 Room / Location: [facility] MAIN OR F2 / M MAIN OR    Anesthesia Start: 7791 Anesthesia Stop: 9604    Procedure: OPEN REDUCTION INTERNAL FIXATION RIGHT LATERAL MALLEOLUS FRACTURE WITH SYNDESMOSIS FIXATION  (POPLITEAL BLOCK, SAPHENOUS BLOCK) (Right: Ankle) Diagnosis:       Closed displaced fracture of lateral malleolus of right fibula with routine healing      Tear of deltoid ligament of ankle, right, subsequent encounter      (Closed displaced fracture of lateral malleolus of right fibula with routine healing [S82.61XD])      (Tear of deltoid ligament of ankle, right, subsequent encounter [V13.687F])    Surgeons: Onur Comer. Radha Valadez MD Responsible Provider: Sommer Amezcua MD    Anesthesia Type: MAC, regional ASA Status: 3          Anesthesia Type: No value filed.     Tom Phase I: Tom Score: 8    Tom Phase II:        Anesthesia Post Evaluation    Patient location during evaluation: PACU  Patient participation: complete - patient participated  Level of consciousness: awake  Airway patency: patent  Nausea & Vomiting: no vomiting and no nausea  Complications: no  Cardiovascular status: hemodynamically stable  Respiratory status: acceptable  Hydration status: stable

## 2023-07-20 NOTE — ANESTHESIA PROCEDURE NOTES
Peripheral Block    Patient location during procedure: pre-op  Reason for block: procedure for pain, post-op pain management, primary anesthetic and at surgeon's request  Start time: 7/20/2023 10:35 AM  End time: 7/20/2023 10:46 AM  Staffing  Performed: anesthesiologist   Anesthesiologist: Darren Lemus MD  Preanesthetic Checklist  Completed: patient identified, IV checked, site marked, risks and benefits discussed, surgical/procedural consents, timeout performed, anesthesia consent given, oxygen available and monitors applied/VS acknowledged  Peripheral Block   Patient position: supine  Prep: ChloraPrep  Provider prep: mask and sterile gloves  Patient monitoring: cardiac monitor, continuous pulse ox, frequent blood pressure checks, IV access, oxygen and responsive to questions  Block type: Saphenous and Sciatic  Laterality: right  Injection technique: single-shot  Guidance: ultrasound guided    Needle   Needle type:  Other   Needle gauge: 21 G  Needle localization: nerve stimulator and ultrasound guidance  Needle length: 10 cmOther needle type: STIMUPLEX  Assessment   Injection assessment: negative aspiration for heme, no paresthesia on injection, local visualized surrounding nerve on ultrasound and no intravascular symptoms  Hemodynamics: stable  Outcomes: patient tolerated procedure well    Additional Notes  30 ml 0.5% ropivacaine at popliteal, 10ml 0.5% ropivacaine at saphenous  Medications Administered  ropivacaine 2% with epinephrine 1:[id number] injection (ANESTHESIA USE ONLY) (Mixture components: EPINEPHrine PF 1 MG/ML Soln, 0.005 mL; ropivacaine 0.2% Soln, 1 mL) - Perineural   30 mL - 7/20/2023 10:40:00 AM   10 mL - 7/20/2023 10:45:00 AM

## 2023-07-21 LAB
ANION GAP SERPL CALC-SCNC: 4 MMOL/L (ref 5–15)
BUN SERPL-MCNC: 14 MG/DL (ref 6–20)
BUN/CREAT SERPL: 17 (ref 12–20)
CALCIUM SERPL-MCNC: 8.8 MG/DL (ref 8.5–10.1)
CHLORIDE SERPL-SCNC: 106 MMOL/L (ref 97–108)
CO2 SERPL-SCNC: 30 MMOL/L (ref 21–32)
CREAT SERPL-MCNC: 0.84 MG/DL (ref 0.7–1.3)
ERYTHROCYTE [DISTWIDTH] IN BLOOD BY AUTOMATED COUNT: 13.2 % (ref 11.5–14.5)
GLUCOSE SERPL-MCNC: 141 MG/DL (ref 65–100)
HCT VFR BLD AUTO: 35.4 % (ref 36.6–50.3)
HGB BLD-MCNC: 11.3 G/DL (ref 12.1–17)
MCH RBC QN AUTO: 30.6 PG (ref 26–34)
MCHC RBC AUTO-ENTMCNC: 31.9 G/DL (ref 30–36.5)
MCV RBC AUTO: 95.9 FL (ref 80–99)
NRBC # BLD: 0 K/UL (ref 0–0.01)
NRBC BLD-RTO: 0 PER 100 WBC
PLATELET # BLD AUTO: 226 K/UL (ref 150–400)
PMV BLD AUTO: 10.7 FL (ref 8.9–12.9)
POTASSIUM SERPL-SCNC: 3.8 MMOL/L (ref 3.5–5.1)
RBC # BLD AUTO: 3.69 M/UL (ref 4.1–5.7)
SODIUM SERPL-SCNC: 140 MMOL/L (ref 136–145)
WBC # BLD AUTO: 8.5 K/UL (ref 4.1–11.1)

## 2023-07-21 PROCEDURE — 6370000000 HC RX 637 (ALT 250 FOR IP): Performed by: INTERNAL MEDICINE

## 2023-07-21 PROCEDURE — 6370000000 HC RX 637 (ALT 250 FOR IP): Performed by: ORTHOPAEDIC SURGERY

## 2023-07-21 PROCEDURE — 6370000000 HC RX 637 (ALT 250 FOR IP): Performed by: STUDENT IN AN ORGANIZED HEALTH CARE EDUCATION/TRAINING PROGRAM

## 2023-07-21 PROCEDURE — 6360000002 HC RX W HCPCS: Performed by: ANESTHESIOLOGY

## 2023-07-21 PROCEDURE — 97162 PT EVAL MOD COMPLEX 30 MIN: CPT

## 2023-07-21 PROCEDURE — 1100000000 HC RM PRIVATE

## 2023-07-21 PROCEDURE — 85027 COMPLETE CBC AUTOMATED: CPT

## 2023-07-21 PROCEDURE — 80048 BASIC METABOLIC PNL TOTAL CA: CPT

## 2023-07-21 PROCEDURE — 2580000003 HC RX 258: Performed by: ORTHOPAEDIC SURGERY

## 2023-07-21 PROCEDURE — 2580000003 HC RX 258: Performed by: INTERNAL MEDICINE

## 2023-07-21 PROCEDURE — 6360000002 HC RX W HCPCS: Performed by: ORTHOPAEDIC SURGERY

## 2023-07-21 PROCEDURE — 36415 COLL VENOUS BLD VENIPUNCTURE: CPT

## 2023-07-21 PROCEDURE — 97530 THERAPEUTIC ACTIVITIES: CPT

## 2023-07-21 PROCEDURE — 2500000003 HC RX 250 WO HCPCS: Performed by: ORTHOPAEDIC SURGERY

## 2023-07-21 RX ORDER — LORAZEPAM 2 MG/ML
3 INJECTION INTRAMUSCULAR
Status: DISCONTINUED | OUTPATIENT
Start: 2023-07-21 | End: 2023-07-25 | Stop reason: HOSPADM

## 2023-07-21 RX ORDER — SODIUM CHLORIDE 0.9 % (FLUSH) 0.9 %
5-40 SYRINGE (ML) INJECTION EVERY 12 HOURS SCHEDULED
Status: DISCONTINUED | OUTPATIENT
Start: 2023-07-21 | End: 2023-07-25 | Stop reason: HOSPADM

## 2023-07-21 RX ORDER — LORAZEPAM 1 MG/1
3 TABLET ORAL
Status: DISCONTINUED | OUTPATIENT
Start: 2023-07-21 | End: 2023-07-25 | Stop reason: HOSPADM

## 2023-07-21 RX ORDER — GAUZE BANDAGE 2" X 2"
100 BANDAGE TOPICAL DAILY
Status: DISCONTINUED | OUTPATIENT
Start: 2023-07-21 | End: 2023-07-21

## 2023-07-21 RX ORDER — LORAZEPAM 2 MG/ML
2 INJECTION INTRAMUSCULAR
Status: DISCONTINUED | OUTPATIENT
Start: 2023-07-21 | End: 2023-07-25 | Stop reason: HOSPADM

## 2023-07-21 RX ORDER — AMLODIPINE BESYLATE 5 MG/1
5 TABLET ORAL DAILY
Status: DISCONTINUED | OUTPATIENT
Start: 2023-07-21 | End: 2023-07-22

## 2023-07-21 RX ORDER — LORAZEPAM 1 MG/1
2 TABLET ORAL
Status: DISCONTINUED | OUTPATIENT
Start: 2023-07-21 | End: 2023-07-25 | Stop reason: HOSPADM

## 2023-07-21 RX ORDER — SODIUM CHLORIDE 0.9 % (FLUSH) 0.9 %
5-40 SYRINGE (ML) INJECTION PRN
Status: DISCONTINUED | OUTPATIENT
Start: 2023-07-21 | End: 2023-07-25 | Stop reason: HOSPADM

## 2023-07-21 RX ORDER — DULOXETIN HYDROCHLORIDE 20 MG/1
20 CAPSULE, DELAYED RELEASE ORAL DAILY
Status: DISCONTINUED | OUTPATIENT
Start: 2023-07-21 | End: 2023-07-23

## 2023-07-21 RX ORDER — LORAZEPAM 2 MG/ML
4 INJECTION INTRAMUSCULAR
Status: DISCONTINUED | OUTPATIENT
Start: 2023-07-21 | End: 2023-07-25 | Stop reason: HOSPADM

## 2023-07-21 RX ORDER — SODIUM CHLORIDE 9 MG/ML
INJECTION, SOLUTION INTRAVENOUS PRN
Status: DISCONTINUED | OUTPATIENT
Start: 2023-07-21 | End: 2023-07-25 | Stop reason: HOSPADM

## 2023-07-21 RX ORDER — FUROSEMIDE 20 MG/1
10 TABLET ORAL DAILY
Status: DISCONTINUED | OUTPATIENT
Start: 2023-07-22 | End: 2023-07-25 | Stop reason: HOSPADM

## 2023-07-21 RX ORDER — LORAZEPAM 1 MG/1
4 TABLET ORAL
Status: DISCONTINUED | OUTPATIENT
Start: 2023-07-21 | End: 2023-07-25 | Stop reason: HOSPADM

## 2023-07-21 RX ORDER — LORAZEPAM 1 MG/1
1 TABLET ORAL
Status: DISCONTINUED | OUTPATIENT
Start: 2023-07-21 | End: 2023-07-25 | Stop reason: HOSPADM

## 2023-07-21 RX ORDER — FOLIC ACID 1 MG/1
1 TABLET ORAL DAILY
Status: DISCONTINUED | OUTPATIENT
Start: 2023-07-21 | End: 2023-07-25 | Stop reason: HOSPADM

## 2023-07-21 RX ORDER — LORAZEPAM 2 MG/ML
1 INJECTION INTRAMUSCULAR
Status: DISCONTINUED | OUTPATIENT
Start: 2023-07-21 | End: 2023-07-25 | Stop reason: HOSPADM

## 2023-07-21 RX ADMIN — WATER 2000 MG: 1 INJECTION INTRAMUSCULAR; INTRAVENOUS; SUBCUTANEOUS at 04:01

## 2023-07-21 RX ADMIN — GABAPENTIN 600 MG: 300 CAPSULE ORAL at 14:09

## 2023-07-21 RX ADMIN — GABAPENTIN 600 MG: 300 CAPSULE ORAL at 23:35

## 2023-07-21 RX ADMIN — POTASSIUM CHLORIDE 10 MEQ: 750 TABLET, FILM COATED, EXTENDED RELEASE ORAL at 09:23

## 2023-07-21 RX ADMIN — Medication 1 TABLET: at 09:24

## 2023-07-21 RX ADMIN — ASPIRIN 81 MG: 81 TABLET, COATED ORAL at 09:20

## 2023-07-21 RX ADMIN — LORAZEPAM 1 MG: 1 TABLET ORAL at 09:20

## 2023-07-21 RX ADMIN — Medication 100 MG: at 09:21

## 2023-07-21 RX ADMIN — GABAPENTIN 600 MG: 300 CAPSULE ORAL at 09:25

## 2023-07-21 RX ADMIN — SODIUM CHLORIDE, PRESERVATIVE FREE 10 ML: 5 INJECTION INTRAVENOUS at 21:17

## 2023-07-21 RX ADMIN — ACETAMINOPHEN 650 MG: 325 TABLET ORAL at 07:13

## 2023-07-21 RX ADMIN — AMLODIPINE BESYLATE 5 MG: 5 TABLET ORAL at 18:37

## 2023-07-21 RX ADMIN — FUROSEMIDE 20 MG: 20 TABLET ORAL at 09:22

## 2023-07-21 RX ADMIN — DIPHENHYDRAMINE HYDROCHLORIDE 12.5 MG: 50 INJECTION, SOLUTION INTRAMUSCULAR; INTRAVENOUS at 01:11

## 2023-07-21 RX ADMIN — RIVAROXABAN 20 MG: 20 TABLET, FILM COATED ORAL at 01:02

## 2023-07-21 RX ADMIN — DULOXETINE HYDROCHLORIDE 20 MG: 20 CAPSULE, DELAYED RELEASE ORAL at 18:37

## 2023-07-21 RX ADMIN — SODIUM CHLORIDE, PRESERVATIVE FREE 10 ML: 5 INJECTION INTRAVENOUS at 01:11

## 2023-07-21 RX ADMIN — SODIUM CHLORIDE, PRESERVATIVE FREE 10 ML: 5 INJECTION INTRAVENOUS at 21:18

## 2023-07-21 RX ADMIN — ROSUVASTATIN CALCIUM 10 MG: 10 TABLET, FILM COATED ORAL at 21:13

## 2023-07-21 RX ADMIN — METOPROLOL SUCCINATE 50 MG: 50 TABLET, EXTENDED RELEASE ORAL at 09:25

## 2023-07-21 RX ADMIN — ALLOPURINOL 100 MG: 100 TABLET ORAL at 09:22

## 2023-07-21 RX ADMIN — MONTELUKAST 10 MG: 10 TABLET, FILM COATED ORAL at 21:13

## 2023-07-21 RX ADMIN — NORTRIPTYLINE HYDROCHLORIDE 50 MG: 25 CAPSULE ORAL at 21:13

## 2023-07-21 RX ADMIN — CYANOCOBALAMIN TAB 500 MCG 2500 MCG: 500 TAB at 09:22

## 2023-07-21 RX ADMIN — LORAZEPAM 1 MG: 1 TABLET ORAL at 04:01

## 2023-07-21 ASSESSMENT — PAIN DESCRIPTION - LOCATION: LOCATION: HEAD

## 2023-07-21 ASSESSMENT — PAIN DESCRIPTION - DESCRIPTORS: DESCRIPTORS: ACHING

## 2023-07-21 ASSESSMENT — PAIN SCALES - GENERAL
PAINLEVEL_OUTOF10: 3
PAINLEVEL_OUTOF10: 0

## 2023-07-21 NOTE — PERIOP NOTE
Bedside, Verbal, and Written shift change report given to [address] [place] [address] (oncoming nurse) by Lex Cobb (offgoing nurse). Report included the following information Nurse Handoff Report, Adult Overview, Surgery Report, Intake/Output, Recent Results, and Procedure Verification.

## 2023-07-21 NOTE — PERIOP NOTE
Patient is requesting Jeremyflaquitoe Elliszza. It is ordered but without a dispense time tonight. Pharmacist notified and she has stated the earliest she I able to dispense it is 0100. It is contraindicated prior to that time. Patient was notified. 0130 Patient is restless and complaining of being hot and itching. Benadryl given. Gown removed and patient has been repositioned. 7028 Patient alert and answering orientation questions appropriately, but he is becoming more agitated and restless. He spilled the urinal in the bed. A bath pack was administered and the linens were changed. During the activity his orientation and memory remained intact. He was hypertensive and had a higher HR after the activity. 2 LPM oxygen was placed for 92% saturation on the pulse ox. He still denies pain. Will continue to monitor.

## 2023-07-21 NOTE — PERIOP NOTE
TRANSFER - OUT REPORT:    Verbal report given to ernst rn on Mehnaz Alexander  being transferred to  [phone] for routine post-op       Report consisted of patient's Situation, Background, Assessment and   Recommendations(SBAR). Information from the following report(s) Nurse Handoff Report, Intake/Output, and MAR was reviewed with the receiving nurse. Lines:   Peripheral IV 07/20/23 Posterior;Right Hand (Active)   Site Assessment Clean, dry & intact 07/21/23 1156   Line Status Flushed 07/21/23 [facility] Connections checked and tightened 07/21/23 1156   Phlebitis Assessment No symptoms 07/21/23 1156   Infiltration Assessment 0 07/21/23 1156   Alcohol Cap Used Yes 07/21/23 0810   Dressing Status Clean, dry & intact 07/21/23 1156   Dressing Type Transparent 07/21/23 1156   Dressing Intervention New 07/21/23 0423        Opportunity for questions and clarification was provided.       Patient transported with:  Registered Nurse

## 2023-07-21 NOTE — CARE COORDINATION
7/21/2023  2:39 PM  Case management note    Reason for Admission:  fibula fracture    Patient came to hospital for fracture repair. Patient was admitted. He will be NWB x 3 months. Patient is , and independent with ADLS's prior to fall. He had not been driving lately due to neuropathy  Patient has history of Factor V on Xarelto, DVT, DM COPD, gout, HTN and neuropathy  [facility] [address] [facility] [address], they have walker and BSC                     RUR Score:          10%           Plan for utilizing home health:      PT/OT to eval    PCP: First and Last name:  Dennie Pacer, MD     Name of Practice:    Are you a current patient: Yes/No: 1 week   Approximate date of last visit:    Can you participate in a virtual visit with your PCP:                     Current Advanced Directive/Advance Care Plan: Full Code Limited Code details: Intubation/Re-intubation No Comment; Defibrillation/Cardioversion No Comment; Chest Compressions No Comment; Resuscitative Medications No Comment;  Other No Comment  AD not on file      Healthcare Decision Maker:   Orville Greenwood                              Transition of Care Plan:                      Unable to determine at this time  PCP follow up  Ortho follow up  AD follow up  CM to follow up    [facility]

## 2023-07-21 NOTE — PERIOP NOTE
TRANSFER - IN REPORT:    Verbal report received from PERRY Damon on [facility]  being received from pacu for routine post-op      Report consisted of patient's Situation, Background, Assessment and   Recommendations(SBAR). Information from the following report(s) Nurse Handoff Report, Surgery Report, Intake/Output, MAR, and Cardiac Rhythm nsr  was reviewed with the receiving nurse. Opportunity for questions and clarification was provided. Assessment completed upon patient's arrival to unit and care assumed.

## 2023-07-21 NOTE — CARE COORDINATION
07/21/23 1434   Service Assessment   Patient Orientation Alert and Oriented   Cognition Alert   History Provided By Patient   Primary Caregiver Self   Accompanied By/Relationship spouse   Support Systems Spouse/Significant Other   Patient's Healthcare Decision Maker is: Named in [address]   PCP Verified by CM Yes   Last Visit to PCP Within last 3 months   Prior Functional Level Independent in ADLs/IADLs   Current Functional Level Other (see comment)  (NWB x 3 months)   Can patient return to prior living arrangement Unknown at present   Ability to make needs known: Fair   Family able to assist with home care needs: Yes   Would you like for me to discuss the discharge plan with any other family members/significant others, and if so, who?  Yes   Financial Resources None   [employer] None

## 2023-07-21 NOTE — PERIOP NOTE
Bedside, Verbal, and Written shift change report given to [address] (oncoming nurse) by Ling Viveros (offgoing nurse). Report included the following information Nurse Handoff Report, Index, Adult Overview, Surgery Report, Intake/Output, MAR, Recent Results, and Alarm Parameters. Patient has periods of confusion - reminded patient foot is NWB. Pericare given at 0600, bed sheets changed, and patient repositioned. PRN Tylenol given for headache. Breakfast tray tolerated.  No complaints at this time at handoff

## 2023-07-21 NOTE — PLAN OF CARE
Problem: Pain  Goal: Verbalizes/displays adequate comfort level or baseline comfort level  Outcome: Progressing  Flowsheets (Taken 7/20/2023 2301 by Wesley Patten, RN)  Verbalizes/displays adequate comfort level or baseline comfort level:   Encourage patient to monitor pain and request assistance   Assess pain using appropriate pain scale   Administer analgesics based on type and severity of pain and evaluate response   Implement non-pharmacological measures as appropriate and evaluate response     Problem: Safety - Adult  Goal: Free from fall injury  Outcome: Progressing     Problem: ABCDS Injury Assessment  Goal: Absence of physical injury  Outcome: Progressing     Problem: Chronic Conditions and Co-morbidities  Goal: Patient's chronic conditions and co-morbidity symptoms are monitored and maintained or improved  Outcome: Progressing  Flowsheets  Taken 7/21/2023 0425 by [facility] - Patient's Chronic Conditions and Co-Morbidity Symptoms are Monitored and Maintained or Improved: Monitor and assess patient's chronic conditions and comorbid symptoms for stability, deterioration, or improvement  Taken 7/20/2023 2301 by Wesley Patten, RN  Care Plan - Patient's Chronic Conditions and Co-Morbidity Symptoms are Monitored and Maintained or Improved:   Monitor and assess patient's chronic conditions and comorbid symptoms for stability, deterioration, or improvement   Collaborate with multidisciplinary team to address chronic and comorbid conditions and prevent exacerbation or deterioration   Update acute care plan with appropriate goals if chronic or comorbid symptoms are exacerbated and prevent overall improvement and discharge     Problem: Discharge Planning  Goal: Discharge to home or other facility with appropriate resources  Outcome: Progressing     Problem: Skin/Tissue Integrity  Goal: Absence of new skin breakdown  Description: 1. Monitor for areas of redness and/or skin breakdown  2. Assess vascular access sites hourly  3. Every 4-6 hours minimum:  Change oxygen saturation probe site  4. Every 4-6 hours:  If on nasal continuous positive airway pressure, respiratory therapy assess nares and determine need for appliance change or resting period.   Outcome: Progressing

## 2023-07-22 ENCOUNTER — APPOINTMENT (OUTPATIENT)
Facility: HOSPITAL | Age: 78
DRG: 492 | End: 2023-07-22
Attending: STUDENT IN AN ORGANIZED HEALTH CARE EDUCATION/TRAINING PROGRAM
Payer: MEDICARE

## 2023-07-22 LAB
ANION GAP SERPL CALC-SCNC: 5 MMOL/L (ref 5–15)
BUN SERPL-MCNC: 10 MG/DL (ref 6–20)
BUN/CREAT SERPL: 16 (ref 12–20)
CALCIUM SERPL-MCNC: 9 MG/DL (ref 8.5–10.1)
CHLORIDE SERPL-SCNC: 105 MMOL/L (ref 97–108)
CO2 SERPL-SCNC: 30 MMOL/L (ref 21–32)
CREAT SERPL-MCNC: 0.62 MG/DL (ref 0.7–1.3)
ECHO AO ARCH DIAM: 2.4 CM
ECHO AO ASC DIAM: 3.3 CM
ECHO AO ASCENDING AORTA INDEX: 1.56 CM/M2
ECHO AV AREA PEAK VELOCITY: 1.5 CM2
ECHO AV AREA VTI: 1.6 CM2
ECHO AV AREA/BSA PEAK VELOCITY: 0.7 CM2/M2
ECHO AV AREA/BSA VTI: 0.8 CM2/M2
ECHO AV MEAN GRADIENT: 11 MMHG
ECHO AV MEAN VELOCITY: 1.2 M/S
ECHO AV PEAK GRADIENT: 13 MMHG
ECHO AV PEAK VELOCITY: 1.7 M/S
ECHO AV VTI: 27.5 CM
ECHO BSA: 2.14 M2
ECHO EST RA PRESSURE: 8 MMHG
ECHO LA DIAMETER INDEX: 1.51 CM/M2
ECHO LA DIAMETER: 3.2 CM
ECHO LA VOL 2C: 41 ML (ref 18–58)
ECHO LA VOL 2C: 43 ML (ref 18–58)
ECHO LA VOL 4C: 33 ML (ref 18–58)
ECHO LA VOL 4C: 37 ML (ref 18–58)
ECHO LA VOL BP: 37 ML (ref 18–58)
ECHO LA VOL/BSA BIPLANE: 17 ML/M2 (ref 16–34)
ECHO LA VOLUME AREA LENGTH: 40 ML
ECHO LA VOLUME INDEX AREA LENGTH: 19 ML/M2 (ref 16–34)
ECHO LV E' LATERAL VELOCITY: 6 CM/S
ECHO LV E' SEPTAL VELOCITY: 7 CM/S
ECHO LV EDV A2C: 64 ML
ECHO LV EDV A4C: 66 ML
ECHO LV EDV BP: 65 ML (ref 67–155)
ECHO LV EDV INDEX A4C: 31 ML/M2
ECHO LV EDV INDEX BP: 31 ML/M2
ECHO LV EDV NDEX A2C: 30 ML/M2
ECHO LV EJECTION FRACTION A2C: 74 %
ECHO LV EJECTION FRACTION A4C: 56 %
ECHO LV ESV A2C: 17 ML
ECHO LV ESV A4C: 29 ML
ECHO LV ESV BP: 23 ML (ref 22–58)
ECHO LV ESV INDEX A2C: 8 ML/M2
ECHO LV ESV INDEX A4C: 14 ML/M2
ECHO LV ESV INDEX BP: 11 ML/M2
ECHO LV FRACTIONAL SHORTENING: 36 % (ref 28–44)
ECHO LV INTERNAL DIMENSION DIASTOLE INDEX: 1.84 CM/M2
ECHO LV INTERNAL DIMENSION DIASTOLIC: 3.9 CM (ref 4.2–5.9)
ECHO LV INTERNAL DIMENSION SYSTOLIC INDEX: 1.18 CM/M2
ECHO LV INTERNAL DIMENSION SYSTOLIC: 2.5 CM
ECHO LV IVSD: 1 CM (ref 0.6–1)
ECHO LV MASS 2D: 122.1 G (ref 88–224)
ECHO LV MASS INDEX 2D: 57.6 G/M2 (ref 49–115)
ECHO LV POSTERIOR WALL DIASTOLIC: 1 CM (ref 0.6–1)
ECHO LV RELATIVE WALL THICKNESS RATIO: 0.51
ECHO LVOT AREA: 2.3 CM2
ECHO LVOT AV VTI INDEX: 0.72
ECHO LVOT DIAM: 1.7 CM
ECHO LVOT STROKE VOLUME INDEX: 21.1 ML/M2
ECHO LVOT SV: 44.7 ML
ECHO LVOT VTI: 19.7 CM
ECHO MV A VELOCITY: 0.99 M/S
ECHO MV E DECELERATION TIME (DT): 185.9 MS
ECHO MV E VELOCITY: 0.76 M/S
ECHO MV E/A RATIO: 0.77
ECHO MV E/E' LATERAL: 12.67
ECHO MV E/E' RATIO (AVERAGED): 11.76
ECHO MV E/E' SEPTAL: 10.86
ECHO MV REGURGITANT PEAK GRADIENT: 85 MMHG
ECHO MV REGURGITANT PEAK VELOCITY: 4.6 M/S
ECHO PULMONARY ARTERY END DIASTOLIC PRESSURE: 2 MMHG
ECHO PV MAX VELOCITY: 1 M/S
ECHO PV PEAK GRADIENT: 4 MMHG
ECHO PV REGURGITANT MAX VELOCITY: 0.7 M/S
ECHO RIGHT VENTRICULAR SYSTOLIC PRESSURE (RVSP): 32 MMHG
ECHO RV FREE WALL PEAK S': 14 CM/S
ECHO RV INTERNAL DIMENSION: 3.2 CM
ECHO RV TAPSE: 1.8 CM (ref 1.7–?)
ECHO TV REGURGITANT MAX VELOCITY: 2.45 M/S
ECHO TV REGURGITANT PEAK GRADIENT: 24 MMHG
ERYTHROCYTE [DISTWIDTH] IN BLOOD BY AUTOMATED COUNT: 13.2 % (ref 11.5–14.5)
GLUCOSE SERPL-MCNC: 135 MG/DL (ref 65–100)
HCT VFR BLD AUTO: 36.9 % (ref 36.6–50.3)
HGB BLD-MCNC: 11.6 G/DL (ref 12.1–17)
MCH RBC QN AUTO: 30.2 PG (ref 26–34)
MCHC RBC AUTO-ENTMCNC: 31.4 G/DL (ref 30–36.5)
MCV RBC AUTO: 96.1 FL (ref 80–99)
NRBC # BLD: 0 K/UL (ref 0–0.01)
NRBC BLD-RTO: 0 PER 100 WBC
PLATELET # BLD AUTO: 245 K/UL (ref 150–400)
PMV BLD AUTO: 10.8 FL (ref 8.9–12.9)
POTASSIUM SERPL-SCNC: 3.7 MMOL/L (ref 3.5–5.1)
RBC # BLD AUTO: 3.84 M/UL (ref 4.1–5.7)
SODIUM SERPL-SCNC: 140 MMOL/L (ref 136–145)
WBC # BLD AUTO: 10.4 K/UL (ref 4.1–11.1)

## 2023-07-22 PROCEDURE — 6370000000 HC RX 637 (ALT 250 FOR IP): Performed by: STUDENT IN AN ORGANIZED HEALTH CARE EDUCATION/TRAINING PROGRAM

## 2023-07-22 PROCEDURE — 1100000000 HC RM PRIVATE

## 2023-07-22 PROCEDURE — 97530 THERAPEUTIC ACTIVITIES: CPT

## 2023-07-22 PROCEDURE — 2580000003 HC RX 258: Performed by: ORTHOPAEDIC SURGERY

## 2023-07-22 PROCEDURE — 6370000000 HC RX 637 (ALT 250 FOR IP): Performed by: ORTHOPAEDIC SURGERY

## 2023-07-22 PROCEDURE — 36415 COLL VENOUS BLD VENIPUNCTURE: CPT

## 2023-07-22 PROCEDURE — 97165 OT EVAL LOW COMPLEX 30 MIN: CPT

## 2023-07-22 PROCEDURE — 94761 N-INVAS EAR/PLS OXIMETRY MLT: CPT

## 2023-07-22 PROCEDURE — 80048 BASIC METABOLIC PNL TOTAL CA: CPT

## 2023-07-22 PROCEDURE — 6370000000 HC RX 637 (ALT 250 FOR IP): Performed by: INTERNAL MEDICINE

## 2023-07-22 PROCEDURE — 85027 COMPLETE CBC AUTOMATED: CPT

## 2023-07-22 PROCEDURE — 93306 TTE W/DOPPLER COMPLETE: CPT

## 2023-07-22 PROCEDURE — 2580000003 HC RX 258: Performed by: INTERNAL MEDICINE

## 2023-07-22 RX ORDER — MORPHINE SULFATE 4 MG/ML
4 INJECTION, SOLUTION INTRAMUSCULAR; INTRAVENOUS EVERY 6 HOURS PRN
Status: DISCONTINUED | OUTPATIENT
Start: 2023-07-22 | End: 2023-07-25 | Stop reason: HOSPADM

## 2023-07-22 RX ORDER — HYDRALAZINE HYDROCHLORIDE 25 MG/1
25 TABLET, FILM COATED ORAL EVERY 6 HOURS SCHEDULED
Status: DISCONTINUED | OUTPATIENT
Start: 2023-07-23 | End: 2023-07-22

## 2023-07-22 RX ORDER — HYDRALAZINE HYDROCHLORIDE 25 MG/1
25 TABLET, FILM COATED ORAL EVERY 8 HOURS SCHEDULED
Status: DISCONTINUED | OUTPATIENT
Start: 2023-07-22 | End: 2023-07-22

## 2023-07-22 RX ORDER — OXYCODONE HYDROCHLORIDE 5 MG/1
5 TABLET ORAL EVERY 6 HOURS PRN
Status: DISCONTINUED | OUTPATIENT
Start: 2023-07-22 | End: 2023-07-25 | Stop reason: HOSPADM

## 2023-07-22 RX ORDER — HYDRALAZINE HYDROCHLORIDE 25 MG/1
25 TABLET, FILM COATED ORAL EVERY 6 HOURS PRN
Status: DISCONTINUED | OUTPATIENT
Start: 2023-07-22 | End: 2023-07-25 | Stop reason: HOSPADM

## 2023-07-22 RX ORDER — DOCUSATE SODIUM 100 MG/1
100 CAPSULE, LIQUID FILLED ORAL 2 TIMES DAILY
Status: DISCONTINUED | OUTPATIENT
Start: 2023-07-22 | End: 2023-07-23

## 2023-07-22 RX ADMIN — DOCUSATE SODIUM 100 MG: 100 CAPSULE, LIQUID FILLED ORAL at 21:46

## 2023-07-22 RX ADMIN — POTASSIUM CHLORIDE 10 MEQ: 750 TABLET, FILM COATED, EXTENDED RELEASE ORAL at 08:55

## 2023-07-22 RX ADMIN — METOPROLOL SUCCINATE 50 MG: 50 TABLET, EXTENDED RELEASE ORAL at 08:54

## 2023-07-22 RX ADMIN — SODIUM CHLORIDE, PRESERVATIVE FREE 10 ML: 5 INJECTION INTRAVENOUS at 21:47

## 2023-07-22 RX ADMIN — DOCUSATE SODIUM 100 MG: 100 CAPSULE, LIQUID FILLED ORAL at 12:20

## 2023-07-22 RX ADMIN — GABAPENTIN 600 MG: 300 CAPSULE ORAL at 08:57

## 2023-07-22 RX ADMIN — SODIUM CHLORIDE, PRESERVATIVE FREE 10 ML: 5 INJECTION INTRAVENOUS at 21:46

## 2023-07-22 RX ADMIN — Medication 100 MG: at 08:55

## 2023-07-22 RX ADMIN — RIVAROXABAN 20 MG: 20 TABLET, FILM COATED ORAL at 01:37

## 2023-07-22 RX ADMIN — GABAPENTIN 600 MG: 300 CAPSULE ORAL at 14:55

## 2023-07-22 RX ADMIN — SODIUM CHLORIDE, PRESERVATIVE FREE 10 ML: 5 INJECTION INTRAVENOUS at 08:59

## 2023-07-22 RX ADMIN — NORTRIPTYLINE HYDROCHLORIDE 50 MG: 25 CAPSULE ORAL at 21:46

## 2023-07-22 RX ADMIN — AMLODIPINE BESYLATE 5 MG: 5 TABLET ORAL at 08:57

## 2023-07-22 RX ADMIN — DULOXETINE HYDROCHLORIDE 20 MG: 20 CAPSULE, DELAYED RELEASE ORAL at 08:55

## 2023-07-22 RX ADMIN — ASPIRIN 81 MG: 81 TABLET, COATED ORAL at 08:55

## 2023-07-22 RX ADMIN — ALLOPURINOL 100 MG: 100 TABLET ORAL at 08:56

## 2023-07-22 RX ADMIN — FOLIC ACID 1 MG: 1 TABLET ORAL at 08:55

## 2023-07-22 RX ADMIN — ROSUVASTATIN CALCIUM 10 MG: 10 TABLET, FILM COATED ORAL at 21:46

## 2023-07-22 RX ADMIN — FUROSEMIDE 10 MG: 20 TABLET ORAL at 08:55

## 2023-07-22 RX ADMIN — MONTELUKAST 10 MG: 10 TABLET, FILM COATED ORAL at 21:46

## 2023-07-22 RX ADMIN — GABAPENTIN 600 MG: 300 CAPSULE ORAL at 21:30

## 2023-07-22 RX ADMIN — SODIUM CHLORIDE, PRESERVATIVE FREE 10 ML: 5 INJECTION INTRAVENOUS at 08:55

## 2023-07-22 RX ADMIN — CYANOCOBALAMIN TAB 500 MCG 2500 MCG: 500 TAB at 08:56

## 2023-07-22 RX ADMIN — OXYCODONE HYDROCHLORIDE 5 MG: 5 TABLET ORAL at 08:57

## 2023-07-22 ASSESSMENT — PAIN DESCRIPTION - DESCRIPTORS
DESCRIPTORS: ACHING
DESCRIPTORS: ACHING

## 2023-07-22 ASSESSMENT — PAIN DESCRIPTION - LOCATION
LOCATION: KNEE
LOCATION: KNEE

## 2023-07-22 ASSESSMENT — PAIN SCALES - GENERAL
PAINLEVEL_OUTOF10: 4
PAINLEVEL_OUTOF10: 4
PAINLEVEL_OUTOF10: 2

## 2023-07-22 ASSESSMENT — PAIN DESCRIPTION - ORIENTATION
ORIENTATION: LEFT
ORIENTATION: LEFT;ANTERIOR

## 2023-07-22 NOTE — PROGRESS NOTES
[facility] Joe Valadez [place]  [phone]        Hospitalist Progress Note      NAME: Kimo Arrjair   :  1945  MRM:  [id number]    Date/Time of service: 2023         Subjective:     Chief Complaint: Internal med team is consulted for medical management. Patient was personally seen and examined by me during this time period. Chart reviewed. Noted to be tachycardic this am. Wife at bedside and states she was worried about over sedation yesterday and also concerned about constipation due to narcotics. Discussed tachycardia with patient and wife; he denies any heavy alcohol use states he drinks about 2 drinks daily. Denies any sx of withdrawal such as tremors, hallucinations, sweats or headaches        Objective:       Vitals:       Last 24hrs VS reviewed since prior progress note.  Most recent are:    Vitals:    23 1030   BP: (!) 145/74   Pulse: (!) 106   Resp:    Temp:    SpO2:      SpO2 Readings from Last 6 Encounters:   23 91%   23 96%          Intake/Output Summary (Last 24 hours) at 2023 1052  Last data filed at 2023 1042  Gross per 24 hour   Intake 300 ml   Output 400 ml   Net -100 ml          Exam:     Physical Exam:    Gen:  Well-developed, well-nourished, in no acute distress  HEENT:  Pink conjunctivae, EOMI, hearing intact to voice, moist mucous membranes  Resp:  No accessory muscle use, clear breath sounds without wheezes rales or rhonchi  Card:  soft systolic murmur present, normal S1, S2 without thrills, bruits or peripheral edema  Abd:  Soft, non-tender, non-distended  Musc:  RLE w/ dressing c/d/I   Skin:  No rashes or ulcers, skin turgor is good  Neuro:  follows commands appropriately  Psych:  Good insight, oriented to person, place and time, alert      Medications Reviewed: (see below)    Lab Data Reviewed: (see

## 2023-07-22 NOTE — PLAN OF CARE
Problem: Occupational Therapy - Adult  Goal: By Discharge: Performs self-care activities at highest level of function for planned discharge setting. See evaluation for individualized goals. Description: FUNCTIONAL STATUS PRIOR TO ADMISSION:  Patient is independent for self care and functional transfers/mobility at baseline. HOME SUPPORT: Patient lived with spouse but didn't require assistance. Occupational Therapy Goals:  Initiated 7/22/2023  1. Patient will perform grooming with Modified [place] within 7 day(s). 2.  Patient will perform upper body dressing with Modified [place] within 7 day(s). 3.  Patient will perform lower body dressing with Stand by Assist within 7 day(s). 4.  Patient will perform toilet transfers with Stand by Assist  within 7 day(s). 5.  Patient will perform all aspects of toileting with Stand by Assist within 7 day(s). 6.  Patient will participate in upper extremity therapeutic exercise/activities with Stand by Assist for 10 minutes within 7 day(s). 7.  Patient will utilize energy conservation techniques during functional activities with verbal cues within 7 day(s).     Outcome: Progressing     OCCUPATIONAL THERAPY EVALUATION    Patient: Mayte Rosas (22 y.o. male)  Date: 7/22/2023  Primary Diagnosis: Closed displaced fracture of lateral malleolus of right fibula with routine healing [S82.61XD]  Tear of deltoid ligament of ankle, right, subsequent encounter [S93.421D]  Ankle fracture, right, closed, with routine healing, subsequent encounter [S82.891D]  Procedure(s) (LRB):  OPEN REDUCTION INTERNAL FIXATION RIGHT LATERAL MALLEOLUS FRACTURE WITH SYNDESMOSIS FIXATION  (POPLITEAL BLOCK, SAPHENOUS BLOCK) (Right) 2 Days Post-Op     Precautions: Weight Bearing, Fall Risk, Bed Alarm Right Lower Extremity Weight Bearing: Non Weight Bearing                ASSESSMENT :  Patient is 70 y/o male came to [facility] and adm 7/20/2023 and is s/p ORIF right lateral malleolus fracture ORIF right syndesmosis (7/20/2023 by Dr. Angela Estrada and is WNWB RLE). 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 Patient received semi supine in bed A&OX4 and agreeable for OT eval/tx. Per pt report, pt lives with spouse in 4 level home (stays on main two levels) with 4/5 steps wide rodriguez rails to enter and 15 steps right rail to second level and is independent for self care and functional transfers/mobility at baseline. Patient presents with decreased balance (good sitting, anticipate poor standing), decreased activity tolerance, generalized weakness, decreased safety awareness (cueing for safe hand placement for stands, cueing to not scoot further towards EOB) and increased need for assist with self care (SBA grooming and UB dressing simulated EOB, max A LB Dressing socks simulated EOB 2/2 difficulty reaching in tailor sitting position) and functional transfers/mobility (SBA sup->sit and scooting toward EOB with increased time, max Ax2 sit<->stand slight bed clearance unable to come up to full stance with bed raised with gait belt/RW, mod Ax2 sit->sup 2/2 close to EOB). Patient educated throughout session to maintain NWB of RLE however with new complaint of Left knee pain (nursing/ortho informed and aware). Patient would benefit from continued skilled OT services while at [facility] In order to increase safety and independence with self care and functional transfers/mobility. Recommend discharge to therapy 5 days a week in a rehab setting when medically appropriate. Functional Outcome Measure: The patient scored 17/24 on the [address] [place] [address] 1192 outcome measure.          PLAN :  Recommendations and Planned Interventions:   self care training, therapeutic activities, functional mobility training, balance training, endurance activities, patient education, home safety training, and family training/education    Frequency/Duration: OT Plan Brief history review  MEDIUM Complexity: 3-5 Performance deficits relating to physical, cognitive, or psychosocial skills that result in activity limitations and/or participation restrictions MEDIUM Complexity: Patient may present with comorbidities that affect occupational performance.  Minimal to moderate modifications of tasks or assist (eg. physical or verbal) with assist is necessary to enable pt to complete eval   Based on the above components, the patient evaluation is determined to be of the following complexity level: Low

## 2023-07-22 NOTE — SIGNIFICANT EVENT
Notified by nursing that pt is drowsy and she will hold his evening gabapentin. Review of notes shows pt is post op from right ankle fracture that was repaired on 7/20. Per review of notes- review of recent meds include nortryptline and gabapentin. Pt seen at bedside, no one present in room, lights on, TV on- pt seen laying on right side - able to tell me his name, initally says he is at home then in hospital.  Pt denies any complaints at this time, is noted to be restless in bed. Pt relates he is tired, TV and lights off, door left open for ambient light. Call bell in reach on bed. Will continue to monitor for change.

## 2023-07-22 NOTE — PLAN OF CARE
Problem: Occupational Therapy - Adult  Goal: By Discharge: Performs self-care activities at highest level of function for planned discharge setting. See evaluation for individualized goals. Description: FUNCTIONAL STATUS PRIOR TO ADMISSION:  Patient is independent for self care and functional transfers/mobility at baseline. HOME SUPPORT: Patient lived with spouse but didn't require assistance. Occupational Therapy Goals:  Initiated 7/22/2023  1. Patient will perform grooming with Modified [place] within 7 day(s). 2.  Patient will perform upper body dressing with Modified [place] within 7 day(s). 3.  Patient will perform lower body dressing with Stand by Assist within 7 day(s). 4.  Patient will perform toilet transfers with Stand by Assist  within 7 day(s). 5.  Patient will perform all aspects of toileting with Stand by Assist within 7 day(s). 6.  Patient will participate in upper extremity therapeutic exercise/activities with Stand by Assist for 10 minutes within 7 day(s).     7.  Patient will utilize energy conservation techniques during functional activities with verbal cues within 7 day(s).    7/22/2023 0836 by Lauren Monae OT  Outcome: Progressing

## 2023-07-22 NOTE — PLAN OF CARE
Problem: Physical Therapy - Adult  Goal: By Discharge: Performs mobility at highest level of function for planned discharge setting. See evaluation for individualized goals. Description: FUNCTIONAL STATUS PRIOR TO ADMISSION: Patient was modified independent using a rolling walker for functional mobility. HOME SUPPORT PRIOR TO ADMISSION: The patient lived with wife and required minimal assistance for adls. Physical Therapy Goals  Initiated 7/21/2023  1. Patient will move from supine to sit and sit to supine, scoot up and down, and roll side to side in bed with minimal assistance within 7 day(s). 2.  Patient will perform sit to stand with minimal assistance within 7 day(s). 3.  Patient will transfer from bed to chair and chair to bed with minimal assistance using the least restrictive device within 7 day(s). 4.  Patient will ambulate with minimal assistance for 5 feet with the least restrictive device maintain NWB RLEwithin 7 day(s). 5.  Patient will ascend/descend 4 stairs with 2 handrail(s) and maintain NWB RLE with moderate assistance within 7 day(s). Outcome: Progressing   PHYSICAL THERAPY TREATMENT    Patient: Christen Narvaez (45 y.o. male)  Date: 7/22/2023  Diagnosis: Closed displaced fracture of lateral malleolus of right fibula with routine healing [S82.61XD]  Tear of deltoid ligament of ankle, right, subsequent encounter [S93.421D]  Ankle fracture, right, closed, with routine healing, subsequent encounter [S82.891D] Ankle fracture, right, closed, with routine healing, subsequent encounter  Procedure(s) (LRB):  OPEN REDUCTION INTERNAL FIXATION RIGHT LATERAL MALLEOLUS FRACTURE WITH SYNDESMOSIS FIXATION  (POPLITEAL BLOCK, SAPHENOUS BLOCK) (Right) 2 Days Post-Op  Precautions: Weight Bearing, Fall Risk, Bed Alarm Right Lower Extremity Weight Bearing: Non Weight Bearing                  ASSESSMENT:  Patient continues to benefit from skilled PT services. Pt sitting on EOB on arrival of PT. Pt

## 2023-07-22 NOTE — CARE COORDINATION
3:52 PM  Spoke with [facility], the liaison who is assisting Dion Fleischer today Dion Fleischer is Liaison tomorrow [phone]). [facility] said that they can accept patient for inpatient rehab, has talked to patient on the phone today and is calling his wife. CM will follow tomorrow in the event he is cleared medically for discharge. (Has some knee pain from gout, which Dr Dionte Breaux is aware that the rehab hospital can address this as well.)  2:49pm  Identified preference for Inpatient rehab as recommended by therapy. Patient lives near [place] and [facility] and has had [facility] previously, a HH arm of [facility], so that is identified as first preference. Backup preference is [facility]. Referrals started today in [address]. Working with physicians to determine estimated discharge date.       Transition of Care  RUR 10%  1- Referrals to IRF pending  2- Cm following to coordinate post acute plan  3- Transportation TBD

## 2023-07-23 ENCOUNTER — APPOINTMENT (OUTPATIENT)
Facility: HOSPITAL | Age: 78
DRG: 492 | End: 2023-07-23
Attending: ORTHOPAEDIC SURGERY
Payer: MEDICARE

## 2023-07-23 LAB
ANION GAP SERPL CALC-SCNC: 4 MMOL/L (ref 5–15)
APPEARANCE UR: CLEAR
B PERT DNA SPEC QL NAA+PROBE: NOT DETECTED
BACTERIA URNS QL MICRO: NEGATIVE /HPF
BILIRUB UR QL: NEGATIVE
BORDETELLA PARAPERTUSSIS BY PCR: NOT DETECTED
BUN SERPL-MCNC: 11 MG/DL (ref 6–20)
BUN/CREAT SERPL: 18 (ref 12–20)
C PNEUM DNA SPEC QL NAA+PROBE: NOT DETECTED
CALCIUM SERPL-MCNC: 8.9 MG/DL (ref 8.5–10.1)
CHLORIDE SERPL-SCNC: 103 MMOL/L (ref 97–108)
CO2 SERPL-SCNC: 30 MMOL/L (ref 21–32)
COLOR UR: ABNORMAL
CREAT SERPL-MCNC: 0.61 MG/DL (ref 0.7–1.3)
EPITH CASTS URNS QL MICRO: ABNORMAL /LPF
ERYTHROCYTE [DISTWIDTH] IN BLOOD BY AUTOMATED COUNT: 13.2 % (ref 11.5–14.5)
FLUAV SUBTYP SPEC NAA+PROBE: NOT DETECTED
FLUBV RNA SPEC QL NAA+PROBE: NOT DETECTED
GLUCOSE SERPL-MCNC: 169 MG/DL (ref 65–100)
GLUCOSE UR STRIP.AUTO-MCNC: NEGATIVE MG/DL
HADV DNA SPEC QL NAA+PROBE: NOT DETECTED
HCOV 229E RNA SPEC QL NAA+PROBE: NOT DETECTED
HCOV HKU1 RNA SPEC QL NAA+PROBE: NOT DETECTED
HCOV NL63 RNA SPEC QL NAA+PROBE: NOT DETECTED
HCOV OC43 RNA SPEC QL NAA+PROBE: NOT DETECTED
HCT VFR BLD AUTO: 34.4 % (ref 36.6–50.3)
HGB BLD-MCNC: 10.8 G/DL (ref 12.1–17)
HGB UR QL STRIP: NEGATIVE
HMPV RNA SPEC QL NAA+PROBE: NOT DETECTED
HPIV1 RNA SPEC QL NAA+PROBE: NOT DETECTED
HPIV2 RNA SPEC QL NAA+PROBE: NOT DETECTED
HPIV3 RNA SPEC QL NAA+PROBE: NOT DETECTED
HPIV4 RNA SPEC QL NAA+PROBE: NOT DETECTED
HYALINE CASTS URNS QL MICRO: ABNORMAL /LPF (ref 0–2)
KETONES UR QL STRIP.AUTO: 15 MG/DL
LACTATE BLD-SCNC: 0.79 MMOL/L (ref 0.4–2)
LEUKOCYTE ESTERASE UR QL STRIP.AUTO: NEGATIVE
M PNEUMO DNA SPEC QL NAA+PROBE: NOT DETECTED
MCH RBC QN AUTO: 30.2 PG (ref 26–34)
MCHC RBC AUTO-ENTMCNC: 31.4 G/DL (ref 30–36.5)
MCV RBC AUTO: 96.1 FL (ref 80–99)
NITRITE UR QL STRIP.AUTO: NEGATIVE
NRBC # BLD: 0 K/UL (ref 0–0.01)
NRBC BLD-RTO: 0 PER 100 WBC
PH UR STRIP: 7 (ref 5–8)
PLATELET # BLD AUTO: 237 K/UL (ref 150–400)
PMV BLD AUTO: 11.1 FL (ref 8.9–12.9)
POTASSIUM SERPL-SCNC: 3.4 MMOL/L (ref 3.5–5.1)
PROT UR STRIP-MCNC: 30 MG/DL
RBC # BLD AUTO: 3.58 M/UL (ref 4.1–5.7)
RBC #/AREA URNS HPF: ABNORMAL /HPF (ref 0–5)
RSV RNA SPEC QL NAA+PROBE: NOT DETECTED
RV+EV RNA SPEC QL NAA+PROBE: NOT DETECTED
SARS-COV-2 RNA RESP QL NAA+PROBE: NOT DETECTED
SODIUM SERPL-SCNC: 137 MMOL/L (ref 136–145)
SP GR UR REFRACTOMETRY: 1.02 (ref 1–1.03)
URINE CULTURE IF INDICATED: ABNORMAL
UROBILINOGEN UR QL STRIP.AUTO: 1 EU/DL (ref 0.2–1)
WBC # BLD AUTO: 9.2 K/UL (ref 4.1–11.1)
WBC URNS QL MICRO: ABNORMAL /HPF (ref 0–4)

## 2023-07-23 PROCEDURE — 36415 COLL VENOUS BLD VENIPUNCTURE: CPT

## 2023-07-23 PROCEDURE — 6370000000 HC RX 637 (ALT 250 FOR IP): Performed by: PHYSICIAN ASSISTANT

## 2023-07-23 PROCEDURE — 6360000002 HC RX W HCPCS: Performed by: INTERNAL MEDICINE

## 2023-07-23 PROCEDURE — 71046 X-RAY EXAM CHEST 2 VIEWS: CPT

## 2023-07-23 PROCEDURE — 2580000003 HC RX 258: Performed by: INTERNAL MEDICINE

## 2023-07-23 PROCEDURE — 1100000000 HC RM PRIVATE

## 2023-07-23 PROCEDURE — 81001 URINALYSIS AUTO W/SCOPE: CPT

## 2023-07-23 PROCEDURE — 83605 ASSAY OF LACTIC ACID: CPT

## 2023-07-23 PROCEDURE — 80048 BASIC METABOLIC PNL TOTAL CA: CPT

## 2023-07-23 PROCEDURE — 73560 X-RAY EXAM OF KNEE 1 OR 2: CPT

## 2023-07-23 PROCEDURE — 6370000000 HC RX 637 (ALT 250 FOR IP): Performed by: ORTHOPAEDIC SURGERY

## 2023-07-23 PROCEDURE — 0202U NFCT DS 22 TRGT SARS-COV-2: CPT

## 2023-07-23 PROCEDURE — 2500000003 HC RX 250 WO HCPCS: Performed by: PHYSICIAN ASSISTANT

## 2023-07-23 PROCEDURE — 6370000000 HC RX 637 (ALT 250 FOR IP): Performed by: INTERNAL MEDICINE

## 2023-07-23 PROCEDURE — 85027 COMPLETE CBC AUTOMATED: CPT

## 2023-07-23 PROCEDURE — 74176 CT ABD & PELVIS W/O CONTRAST: CPT

## 2023-07-23 PROCEDURE — 94761 N-INVAS EAR/PLS OXIMETRY MLT: CPT

## 2023-07-23 PROCEDURE — 2580000003 HC RX 258: Performed by: ORTHOPAEDIC SURGERY

## 2023-07-23 PROCEDURE — 6370000000 HC RX 637 (ALT 250 FOR IP): Performed by: STUDENT IN AN ORGANIZED HEALTH CARE EDUCATION/TRAINING PROGRAM

## 2023-07-23 PROCEDURE — 6360000002 HC RX W HCPCS: Performed by: PHYSICIAN ASSISTANT

## 2023-07-23 PROCEDURE — 87040 BLOOD CULTURE FOR BACTERIA: CPT

## 2023-07-23 RX ORDER — PREDNISONE 20 MG/1
40 TABLET ORAL DAILY
Qty: 8 TABLET | Refills: 0 | Status: SHIPPED | OUTPATIENT
Start: 2023-07-24 | End: 2023-07-28

## 2023-07-23 RX ORDER — LIDOCAINE HYDROCHLORIDE 10 MG/ML
10 INJECTION, SOLUTION EPIDURAL; INFILTRATION; INTRACAUDAL; PERINEURAL ONCE
Status: COMPLETED | OUTPATIENT
Start: 2023-07-23 | End: 2023-07-23

## 2023-07-23 RX ORDER — COLCHICINE 0.6 MG/1
0.6 TABLET ORAL DAILY
Status: DISCONTINUED | OUTPATIENT
Start: 2023-07-23 | End: 2023-07-25 | Stop reason: HOSPADM

## 2023-07-23 RX ORDER — POTASSIUM CHLORIDE 750 MG/1
20 TABLET, FILM COATED, EXTENDED RELEASE ORAL ONCE
Status: COMPLETED | OUTPATIENT
Start: 2023-07-23 | End: 2023-07-23

## 2023-07-23 RX ORDER — OXYCODONE HYDROCHLORIDE 5 MG/1
5 TABLET ORAL EVERY 6 HOURS PRN
Qty: 20 TABLET | Refills: 0 | Status: SHIPPED | OUTPATIENT
Start: 2023-07-23 | End: 2023-07-26

## 2023-07-23 RX ORDER — PREDNISONE 20 MG/1
40 TABLET ORAL DAILY
Status: DISCONTINUED | OUTPATIENT
Start: 2023-07-23 | End: 2023-07-24

## 2023-07-23 RX ORDER — METHYLPREDNISOLONE ACETATE 40 MG/ML
40 INJECTION, SUSPENSION INTRA-ARTICULAR; INTRALESIONAL; INTRAMUSCULAR; SOFT TISSUE ONCE
Status: COMPLETED | OUTPATIENT
Start: 2023-07-23 | End: 2023-07-23

## 2023-07-23 RX ORDER — SENNA AND DOCUSATE SODIUM 50; 8.6 MG/1; MG/1
1 TABLET, FILM COATED ORAL 2 TIMES DAILY
Status: DISCONTINUED | OUTPATIENT
Start: 2023-07-23 | End: 2023-07-25 | Stop reason: HOSPADM

## 2023-07-23 RX ADMIN — GABAPENTIN 600 MG: 300 CAPSULE ORAL at 08:43

## 2023-07-23 RX ADMIN — LIDOCAINE HYDROCHLORIDE 10 ML: 10 INJECTION, SOLUTION EPIDURAL; INFILTRATION; INTRACAUDAL; PERINEURAL at 11:47

## 2023-07-23 RX ADMIN — WATER 2000 MG: 1 INJECTION INTRAMUSCULAR; INTRAVENOUS; SUBCUTANEOUS at 17:21

## 2023-07-23 RX ADMIN — DOCUSATE SODIUM 100 MG: 100 CAPSULE, LIQUID FILLED ORAL at 08:43

## 2023-07-23 RX ADMIN — HYDRALAZINE HYDROCHLORIDE 25 MG: 25 TABLET, FILM COATED ORAL at 13:56

## 2023-07-23 RX ADMIN — SODIUM CHLORIDE, PRESERVATIVE FREE 10 ML: 5 INJECTION INTRAVENOUS at 08:47

## 2023-07-23 RX ADMIN — PREDNISONE 40 MG: 20 TABLET ORAL at 11:37

## 2023-07-23 RX ADMIN — ACETAMINOPHEN 650 MG: 325 TABLET ORAL at 14:52

## 2023-07-23 RX ADMIN — POTASSIUM CHLORIDE 10 MEQ: 750 TABLET, FILM COATED, EXTENDED RELEASE ORAL at 08:44

## 2023-07-23 RX ADMIN — Medication 100 MG: at 08:43

## 2023-07-23 RX ADMIN — ASPIRIN 81 MG: 81 TABLET, COATED ORAL at 08:44

## 2023-07-23 RX ADMIN — ROSUVASTATIN CALCIUM 10 MG: 10 TABLET, FILM COATED ORAL at 21:22

## 2023-07-23 RX ADMIN — ALLOPURINOL 100 MG: 100 TABLET ORAL at 08:44

## 2023-07-23 RX ADMIN — METHYLPREDNISOLONE ACETATE 40 MG: 40 INJECTION, SUSPENSION INTRA-ARTICULAR; INTRALESIONAL; INTRAMUSCULAR; INTRASYNOVIAL; SOFT TISSUE at 11:47

## 2023-07-23 RX ADMIN — NORTRIPTYLINE HYDROCHLORIDE 50 MG: 25 CAPSULE ORAL at 21:22

## 2023-07-23 RX ADMIN — CYANOCOBALAMIN TAB 500 MCG 2500 MCG: 500 TAB at 08:43

## 2023-07-23 RX ADMIN — SODIUM CHLORIDE, PRESERVATIVE FREE 10 ML: 5 INJECTION INTRAVENOUS at 21:22

## 2023-07-23 RX ADMIN — POTASSIUM CHLORIDE 20 MEQ: 750 TABLET, FILM COATED, EXTENDED RELEASE ORAL at 10:20

## 2023-07-23 RX ADMIN — SENNOSIDES AND DOCUSATE SODIUM 1 TABLET: 50; 8.6 TABLET ORAL at 11:37

## 2023-07-23 RX ADMIN — FOLIC ACID 1 MG: 1 TABLET ORAL at 08:44

## 2023-07-23 RX ADMIN — VANCOMYCIN HYDROCHLORIDE 2250 MG: 10 INJECTION, POWDER, LYOPHILIZED, FOR SOLUTION INTRAVENOUS at 17:22

## 2023-07-23 RX ADMIN — METOPROLOL SUCCINATE 75 MG: 50 TABLET, EXTENDED RELEASE ORAL at 08:45

## 2023-07-23 RX ADMIN — MONTELUKAST 10 MG: 10 TABLET, FILM COATED ORAL at 21:22

## 2023-07-23 RX ADMIN — GABAPENTIN 600 MG: 300 CAPSULE ORAL at 13:52

## 2023-07-23 RX ADMIN — SODIUM CHLORIDE, PRESERVATIVE FREE 10 ML: 5 INJECTION INTRAVENOUS at 08:45

## 2023-07-23 RX ADMIN — GABAPENTIN 600 MG: 300 CAPSULE ORAL at 21:22

## 2023-07-23 RX ADMIN — COLCHICINE 0.6 MG: 0.6 TABLET, FILM COATED ORAL at 11:37

## 2023-07-23 RX ADMIN — RIVAROXABAN 20 MG: 20 TABLET, FILM COATED ORAL at 01:00

## 2023-07-23 RX ADMIN — FUROSEMIDE 10 MG: 20 TABLET ORAL at 11:37

## 2023-07-23 RX ADMIN — OXYCODONE HYDROCHLORIDE 5 MG: 5 TABLET ORAL at 01:00

## 2023-07-23 RX ADMIN — SENNOSIDES AND DOCUSATE SODIUM 1 TABLET: 50; 8.6 TABLET ORAL at 21:22

## 2023-07-23 ASSESSMENT — PAIN DESCRIPTION - LOCATION
LOCATION: FOOT
LOCATION: KNEE
LOCATION: WRIST

## 2023-07-23 ASSESSMENT — PAIN DESCRIPTION - DESCRIPTORS
DESCRIPTORS: TENDER
DESCRIPTORS: SHARP
DESCRIPTORS: DISCOMFORT

## 2023-07-23 ASSESSMENT — PAIN SCALES - GENERAL
PAINLEVEL_OUTOF10: 6
PAINLEVEL_OUTOF10: 3
PAINLEVEL_OUTOF10: 3
PAINLEVEL_OUTOF10: 2

## 2023-07-23 ASSESSMENT — PAIN DESCRIPTION - ORIENTATION
ORIENTATION: LEFT
ORIENTATION: RIGHT
ORIENTATION: RIGHT

## 2023-07-23 NOTE — PROGRESS NOTES
RRT RN at bedside to evaluate for sepsis. New onset fever and tachycardia. POC lactic performed per protocol with result of 0.79. Primary RN relayed findings to MD- awaiting further orders. No code sepsis at this time as patient does not meet criteria d/t not having organ dysfunction.

## 2023-07-23 NOTE — PLAN OF CARE
Problem: Pain  Goal: Verbalizes/displays adequate comfort level or baseline comfort level  Outcome: Progressing     Problem: Safety - Adult  Goal: Free from fall injury  Outcome: Progressing     Problem: ABCDS Injury Assessment  Goal: Absence of physical injury  Outcome: Progressing     Problem: Chronic Conditions and Co-morbidities  Goal: Patient's chronic conditions and co-morbidity symptoms are monitored and maintained or improved  Outcome: Progressing     Problem: Discharge Planning  Goal: Discharge to home or other facility with appropriate resources  Outcome: Progressing     Problem: Skin/Tissue Integrity  Goal: Absence of new skin breakdown  Description: 1. Monitor for areas of redness and/or skin breakdown  2. Assess vascular access sites hourly  3. Every 4-6 hours minimum:  Change oxygen saturation probe site  4. Every 4-6 hours:  If on nasal continuous positive airway pressure, respiratory therapy assess nares and determine need for appliance change or resting period. Outcome: Progressing     Problem: Physical Therapy - Adult  Goal: By Discharge: Performs mobility at highest level of function for planned discharge setting. See evaluation for individualized goals. Description: FUNCTIONAL STATUS PRIOR TO ADMISSION: Patient was modified independent using a rolling walker for functional mobility. HOME SUPPORT PRIOR TO ADMISSION: The patient lived with wife and required minimal assistance for adls. Physical Therapy Goals  Initiated 7/21/2023  1. Patient will move from supine to sit and sit to supine, scoot up and down, and roll side to side in bed with minimal assistance within 7 day(s). 2.  Patient will perform sit to stand with minimal assistance within 7 day(s). 3.  Patient will transfer from bed to chair and chair to bed with minimal assistance using the least restrictive device within 7 day(s).   4.  Patient will ambulate with minimal assistance for 5 feet with the least restrictive device maintain NWB RLEwithin 7 day(s).    5.  Patient will ascend/descend 4 stairs with 2 handrail(s) and maintain NWB RLE with moderate assistance within 7 day(s).   7/22/2023 1300 by Christoph Boyd PTA  Outcome: Progressing

## 2023-07-23 NOTE — CARE COORDINATION
7/23/2023  12:52 PM  Case Management note    Patient is discharging to [facility]. Patient will be in room 103, Admitted to Dr. Abdirashid Bryant and report number is [phone]  [phone]. Transportation is set up for 300 pm with [facility]  Packet to chart  Will continue to follow,.   [address] [facility] [address]

## 2023-07-23 NOTE — PLAN OF CARE
Problem: Safety - Adult  Goal: Free from fall injury  7/23/2023 1432 by Svetlana Chacon RN  [address] Resolved Met  7/23/2023 1033 by Roldan Orellana RN  Outcome[address] Progressing     Problem: ABCDS Injury Assessment  Goal: Absence of physical injury  7/23/2023 1432 by Svetlana Chacon RN  Outcome: [address] Resolved Met  7/23/2023 1033 by Roldan Orellana RN  Outcome: [address] [facility] [address] Progressing     Problem: Chronic Conditions and Co-morbidities  Goal: Patient's chronic conditions and co-morbidity symptoms are monitored and maintained or improved  7/23/2023 1432 by Svetlana Chacon RN  Outcome: [address] [facility] [address] Resolved Met  7/23/2023 1033 by Roldan Orellana RN  Outcome: [address] [facility] [address] Progressing     Problem: Discharge Planning  Goal: Discharge to home or other facility with appropriate resources  7/23/2023 1432 by Svetlana Chacon RN  Outcome: [address] [facility] [address] Resolved Met  7/23/2023 1033 by Roldan Orellana RN  Outcome: [address] [facility] [address] Progressing     Problem: Skin/Tissue Integrity  Goal: Absence of new skin breakdown  Description: 1. Monitor for areas of redness and/or skin breakdown  2. Assess vascular access sites hourly  3. Every 4-6 hours minimum:  Change oxygen saturation probe site  4. Every 4-6 hours:  If on nasal continuous positive airway pressure, respiratory therapy assess nares and determine need for appliance change or resting period.   7/23/2023 1432 by Svetlana Chacon RN  Outcome: [address] Resolved Met  7/23/2023 1033 by Roldan Orellana RN  Outcome: [address] Progressing

## 2023-07-23 NOTE — PLAN OF CARE
Problem: Pain  Goal: Verbalizes/displays adequate comfort level or baseline comfort level  7/23/2023 1033 by Shanthi South RN  Outcome: [address] [facility] 114 Progressing  7/22/2023 2312 by Isaiah Hand RN  Outcome: Progressing     Problem: Safety - Adult  Goal: Free from fall injury  7/23/2023 1033 by Shanthi South RN  Outcome: [address] [facility] [address] Progressing  7/22/2023 2312 by Isaiah Hand RN  Outcome: Progressing     Problem: ABCDS Injury Assessment  Goal: Absence of physical injury  7/23/2023 1033 by Shanhti South RN  Outcome: [address] [facility] 114 Progressing  7/22/2023 2312 by Isaiah Hand RN  Outcome: Progressing     Problem: Chronic Conditions and Co-morbidities  Goal: Patient's chronic conditions and co-morbidity symptoms are monitored and maintained or improved  7/23/2023 1033 by Shanthi South RN  Outcome: [address] [facility] [address] Progressing  7/22/2023 2312 by Isaiah Hand RN  Outcome: Progressing     Problem: Discharge Planning  Goal: Discharge to home or other facility with appropriate resources  7/23/2023 1033 by Shanthi South RN  Outcome: [address] [facility] [address] Progressing  7/22/2023 2312 by Isaiah Hand RN  Outcome: Progressing     Problem: Skin/Tissue Integrity  Goal: Absence of new skin breakdown  Description: 1. Monitor for areas of redness and/or skin breakdown  2. Assess vascular access sites hourly  3. Every 4-6 hours minimum:  Change oxygen saturation probe site  4. Every 4-6 hours:  If on nasal continuous positive airway pressure, respiratory therapy assess nares and determine need for appliance change or resting period.   7/23/2023 1033 by Shanthi South RN  Outcome: [address] [facility] 114 Progressing  7/22/2023 2312 by Isaiah Hand RN  Outcome: Progressing

## 2023-07-23 NOTE — PROGRESS NOTES
Ortho:    Regarding left knee, left elbow pain-  gouty flair  Upper and Lower eternities  w/o erythema, streaking or lymphadenopathy, left knee trace effusion. Left elbow with Full AROM    2v Left knee xrays reviewed  After discussion with pt/family, medical team-- recommend starting 5days of PO prednisone, left knee CSI-  continue daily gout medication-- previously prescribed/tolerated well(allopurinol and colchicine)      Left knee joint injection; procedure described to pt and wife, risk and benefits reviewed. Consent signed by wife. Under sterile condition-Depo-mederol and lidocaine (40:4) injected into left knee.  Band-aid applied  There were no complication pt tolerated it well  Vitals stable before and after procedure    Jass Frausto RN present and assisted throughout procedure     Lul Casiano PA-C

## 2023-07-24 LAB
ANION GAP SERPL CALC-SCNC: 2 MMOL/L (ref 5–15)
BASOPHILS # BLD: 0 K/UL (ref 0–0.1)
BASOPHILS NFR BLD: 0 % (ref 0–1)
BUN SERPL-MCNC: 17 MG/DL (ref 6–20)
BUN/CREAT SERPL: 26 (ref 12–20)
CALCIUM SERPL-MCNC: 9.4 MG/DL (ref 8.5–10.1)
CHLORIDE SERPL-SCNC: 108 MMOL/L (ref 97–108)
CO2 SERPL-SCNC: 28 MMOL/L (ref 21–32)
CREAT SERPL-MCNC: 0.66 MG/DL (ref 0.7–1.3)
DIFFERENTIAL METHOD BLD: ABNORMAL
EOSINOPHIL # BLD: 0 K/UL (ref 0–0.4)
EOSINOPHIL NFR BLD: 0 % (ref 0–7)
ERYTHROCYTE [DISTWIDTH] IN BLOOD BY AUTOMATED COUNT: 13 % (ref 11.5–14.5)
ERYTHROCYTE [DISTWIDTH] IN BLOOD BY AUTOMATED COUNT: NORMAL % (ref 11.5–14.5)
GLUCOSE SERPL-MCNC: 234 MG/DL (ref 65–100)
HCT VFR BLD AUTO: 35.4 % (ref 36.6–50.3)
HCT VFR BLD AUTO: NORMAL % (ref 36.6–50.3)
HGB BLD-MCNC: 10.9 G/DL (ref 12.1–17)
HGB BLD-MCNC: NORMAL G/DL (ref 12.1–17)
IMM GRANULOCYTES # BLD AUTO: 0 K/UL (ref 0–0.04)
IMM GRANULOCYTES NFR BLD AUTO: 0 % (ref 0–0.5)
LYMPHOCYTES # BLD: 0.6 K/UL (ref 0.8–3.5)
LYMPHOCYTES NFR BLD: 6 % (ref 12–49)
MCH RBC QN AUTO: 29.5 PG (ref 26–34)
MCH RBC QN AUTO: NORMAL PG (ref 26–34)
MCHC RBC AUTO-ENTMCNC: 30.8 G/DL (ref 30–36.5)
MCHC RBC AUTO-ENTMCNC: NORMAL G/DL (ref 30–36.5)
MCV RBC AUTO: 95.7 FL (ref 80–99)
MCV RBC AUTO: NORMAL FL (ref 80–99)
MONOCYTES # BLD: 0.7 K/UL (ref 0–1)
MONOCYTES NFR BLD: 7 % (ref 5–13)
NEUTS SEG # BLD: 9.3 K/UL (ref 1.8–8)
NEUTS SEG NFR BLD: 87 % (ref 32–75)
NRBC # BLD: 0 K/UL (ref 0–0.01)
NRBC # BLD: NORMAL K/UL (ref 0–0.01)
NRBC BLD-RTO: 0 PER 100 WBC
NRBC BLD-RTO: NORMAL PER 100 WBC
PLATELET # BLD AUTO: 280 K/UL (ref 150–400)
PLATELET # BLD AUTO: NORMAL K/UL (ref 150–400)
PMV BLD AUTO: 11.1 FL (ref 8.9–12.9)
PMV BLD AUTO: NORMAL FL (ref 8.9–12.9)
POTASSIUM SERPL-SCNC: 4.1 MMOL/L (ref 3.5–5.1)
RBC # BLD AUTO: 3.7 M/UL (ref 4.1–5.7)
RBC # BLD AUTO: NORMAL M/UL (ref 4.1–5.7)
RBC MORPH BLD: ABNORMAL
SODIUM SERPL-SCNC: 138 MMOL/L (ref 136–145)
WBC # BLD AUTO: 10.6 K/UL (ref 4.1–11.1)
WBC # BLD AUTO: NORMAL K/UL (ref 4.1–11.1)

## 2023-07-24 PROCEDURE — 92610 EVALUATE SWALLOWING FUNCTION: CPT

## 2023-07-24 PROCEDURE — 85025 COMPLETE CBC W/AUTO DIFF WBC: CPT

## 2023-07-24 PROCEDURE — 6370000000 HC RX 637 (ALT 250 FOR IP): Performed by: NURSE PRACTITIONER

## 2023-07-24 PROCEDURE — 85027 COMPLETE CBC AUTOMATED: CPT

## 2023-07-24 PROCEDURE — 2580000003 HC RX 258: Performed by: ORTHOPAEDIC SURGERY

## 2023-07-24 PROCEDURE — 6370000000 HC RX 637 (ALT 250 FOR IP): Performed by: STUDENT IN AN ORGANIZED HEALTH CARE EDUCATION/TRAINING PROGRAM

## 2023-07-24 PROCEDURE — 6370000000 HC RX 637 (ALT 250 FOR IP): Performed by: ORTHOPAEDIC SURGERY

## 2023-07-24 PROCEDURE — 36415 COLL VENOUS BLD VENIPUNCTURE: CPT

## 2023-07-24 PROCEDURE — 97530 THERAPEUTIC ACTIVITIES: CPT

## 2023-07-24 PROCEDURE — 80048 BASIC METABOLIC PNL TOTAL CA: CPT

## 2023-07-24 PROCEDURE — 97535 SELF CARE MNGMENT TRAINING: CPT

## 2023-07-24 PROCEDURE — 6360000002 HC RX W HCPCS: Performed by: INTERNAL MEDICINE

## 2023-07-24 PROCEDURE — 97110 THERAPEUTIC EXERCISES: CPT

## 2023-07-24 PROCEDURE — 98960 EDU&TRN PT SELF-MGMT NQHP 1: CPT

## 2023-07-24 PROCEDURE — 2580000003 HC RX 258: Performed by: INTERNAL MEDICINE

## 2023-07-24 PROCEDURE — 94761 N-INVAS EAR/PLS OXIMETRY MLT: CPT

## 2023-07-24 PROCEDURE — 1100000000 HC RM PRIVATE

## 2023-07-24 PROCEDURE — 6370000000 HC RX 637 (ALT 250 FOR IP): Performed by: INTERNAL MEDICINE

## 2023-07-24 RX ORDER — LANOLIN ALCOHOL/MO/W.PET/CERES
6 CREAM (GRAM) TOPICAL NIGHTLY PRN
Status: DISCONTINUED | OUTPATIENT
Start: 2023-07-24 | End: 2023-07-25 | Stop reason: HOSPADM

## 2023-07-24 RX ORDER — METRONIDAZOLE 500 MG/100ML
500 INJECTION, SOLUTION INTRAVENOUS EVERY 8 HOURS
Status: DISCONTINUED | OUTPATIENT
Start: 2023-07-24 | End: 2023-07-25

## 2023-07-24 RX ADMIN — POTASSIUM CHLORIDE 10 MEQ: 750 TABLET, FILM COATED, EXTENDED RELEASE ORAL at 08:23

## 2023-07-24 RX ADMIN — RIVAROXABAN 20 MG: 20 TABLET, FILM COATED ORAL at 00:42

## 2023-07-24 RX ADMIN — METRONIDAZOLE 500 MG: 500 INJECTION, SOLUTION INTRAVENOUS at 14:48

## 2023-07-24 RX ADMIN — SODIUM CHLORIDE, PRESERVATIVE FREE 10 ML: 5 INJECTION INTRAVENOUS at 08:21

## 2023-07-24 RX ADMIN — ASPIRIN 81 MG: 81 TABLET, COATED ORAL at 08:22

## 2023-07-24 RX ADMIN — SENNOSIDES AND DOCUSATE SODIUM 1 TABLET: 50; 8.6 TABLET ORAL at 21:30

## 2023-07-24 RX ADMIN — GABAPENTIN 600 MG: 300 CAPSULE ORAL at 08:22

## 2023-07-24 RX ADMIN — COLCHICINE 0.6 MG: 0.6 TABLET, FILM COATED ORAL at 08:22

## 2023-07-24 RX ADMIN — SODIUM CHLORIDE, PRESERVATIVE FREE 10 ML: 5 INJECTION INTRAVENOUS at 08:18

## 2023-07-24 RX ADMIN — CYANOCOBALAMIN TAB 500 MCG 2500 MCG: 500 TAB at 08:21

## 2023-07-24 RX ADMIN — SODIUM CHLORIDE, PRESERVATIVE FREE 10 ML: 5 INJECTION INTRAVENOUS at 21:31

## 2023-07-24 RX ADMIN — MONTELUKAST 10 MG: 10 TABLET, FILM COATED ORAL at 21:30

## 2023-07-24 RX ADMIN — GABAPENTIN 600 MG: 300 CAPSULE ORAL at 13:20

## 2023-07-24 RX ADMIN — Medication 6 MG: at 23:31

## 2023-07-24 RX ADMIN — ROSUVASTATIN CALCIUM 10 MG: 10 TABLET, FILM COATED ORAL at 21:30

## 2023-07-24 RX ADMIN — NORTRIPTYLINE HYDROCHLORIDE 50 MG: 25 CAPSULE ORAL at 21:30

## 2023-07-24 RX ADMIN — FUROSEMIDE 10 MG: 20 TABLET ORAL at 08:22

## 2023-07-24 RX ADMIN — GABAPENTIN 600 MG: 300 CAPSULE ORAL at 21:30

## 2023-07-24 RX ADMIN — ALLOPURINOL 100 MG: 100 TABLET ORAL at 08:22

## 2023-07-24 RX ADMIN — FOLIC ACID 1 MG: 1 TABLET ORAL at 08:22

## 2023-07-24 RX ADMIN — CEFEPIME 2000 MG: 2 INJECTION, POWDER, FOR SOLUTION INTRAVENOUS at 21:30

## 2023-07-24 RX ADMIN — Medication 100 MG: at 08:23

## 2023-07-24 RX ADMIN — SENNOSIDES AND DOCUSATE SODIUM 1 TABLET: 50; 8.6 TABLET ORAL at 08:22

## 2023-07-24 RX ADMIN — VANCOMYCIN HYDROCHLORIDE 1250 MG: 1.25 INJECTION, POWDER, LYOPHILIZED, FOR SOLUTION INTRAVENOUS at 04:11

## 2023-07-24 RX ADMIN — METRONIDAZOLE 500 MG: 500 INJECTION, SOLUTION INTRAVENOUS at 22:44

## 2023-07-24 RX ADMIN — METOPROLOL SUCCINATE 75 MG: 50 TABLET, EXTENDED RELEASE ORAL at 08:22

## 2023-07-24 RX ADMIN — VANCOMYCIN HYDROCHLORIDE 1250 MG: 1.25 INJECTION, POWDER, LYOPHILIZED, FOR SOLUTION INTRAVENOUS at 18:38

## 2023-07-24 ASSESSMENT — PAIN DESCRIPTION - ORIENTATION: ORIENTATION: LEFT

## 2023-07-24 ASSESSMENT — PAIN SCALES - GENERAL: PAINLEVEL_OUTOF10: 1

## 2023-07-24 ASSESSMENT — PAIN DESCRIPTION - DESCRIPTORS: DESCRIPTORS: ACHING

## 2023-07-24 ASSESSMENT — PAIN DESCRIPTION - LOCATION: LOCATION: KNEE

## 2023-07-24 NOTE — PLAN OF CARE
Problem: Pain  Goal: Verbalizes/displays adequate comfort level or baseline comfort level  Outcome: HH/[facility] Progressing     Problem: Safety - Adult  Goal: Free from fall injury  Outcome: [address] [facility] [address] Progressing     Problem: Chronic Conditions and Co-morbidities  Goal: Patient's chronic conditions and co-morbidity symptoms are monitored and maintained or improved  Outcome: [address] [facility] 114 Progressing     Problem: Discharge Planning  Goal: Discharge to home or other facility with appropriate resources  Outcome: [address] [facility] 114 Progressing     Problem: Skin/Tissue Integrity  Goal: Absence of new skin breakdown  Description: 1. Monitor for areas of redness and/or skin breakdown  2. Assess vascular access sites hourly  3. Every 4-6 hours minimum:  Change oxygen saturation probe site  4. Every 4-6 hours:  If on nasal continuous positive airway pressure, respiratory therapy assess nares and determine need for appliance change or resting period.   Outcome: [address] [facility] 114 Progressing

## 2023-07-24 NOTE — CARE COORDINATION
7/24/2023   Care Management Progress Note      ICD-10-CM    1. Cardiac valvulopathy  I38 Transthoracic echocardiogram (TTE) complete with contrast, bubble, strain, and 3D PRN     Transthoracic echocardiogram (TTE) complete with contrast, bubble, strain, and 3D PRN      2. Ankle fracture, right, closed, with routine healing, subsequent encounter  S82.891D oxyCODONE (ROXICODONE) 5 MG immediate release tablet          RUR:  14%  Risk Level: [x]Low []Moderate []High  Value-based purchasing: [] Yes [x] No  Bundle patient: [] Yes [x] No   Specify:     Transition of care plan:  Ongoing medical management, anticipating discharge on 7/25 if medically stable  [facility] placement, [facility]   Outpatient follow-up. [facility] in will call for 7/25          11:27 AM Pt not ready for discharge today, anticipating discharge on 7/25 if medically stable. CM relayed to [address] with [facility] who confirmed they will have a bed available for pt tomorrow. CM updated pt and pt's wife at bedside. CM will follow. 7/24/2023 9:24 AM Spoke with pt's wife who requested update on pt's discharge status. CM will follow up with pt's wife after rounds regarding if pt is medically stable for discharge. ESTEPHANIA also sent message to [address] with [facility] to check bed availability. ESTEPHANIA will follow.  ALEXANDRA Malin Patient called in to let us know the Dagmar Alysia is covered under her insurance. Do you want the 5mg or the 10mg teed up? I have left a message for her to call back, she requested I give her a call.

## 2023-07-24 NOTE — PLAN OF CARE
Problem: Physical Therapy - Adult  Goal: By Discharge: Performs mobility at highest level of function for planned discharge setting. See evaluation for individualized goals. Description: FUNCTIONAL STATUS PRIOR TO ADMISSION: Patient was modified independent using a rolling walker for functional mobility. HOME SUPPORT PRIOR TO ADMISSION: The patient lived with wife and required minimal assistance for adls. Physical Therapy Goals  Initiated 7/21/2023  1. Patient will move from supine to sit and sit to supine, scoot up and down, and roll side to side in bed with minimal assistance within 7 day(s). 2.  Patient will perform sit to stand with minimal assistance within 7 day(s). 3.  Patient will transfer from bed to chair and chair to bed with minimal assistance using the least restrictive device within 7 day(s). 4.  Patient will ambulate with minimal assistance for 5 feet with the least restrictive device maintain NWB RLEwithin 7 day(s). 5.  Patient will ascend/descend 4 stairs with 2 handrail(s) and maintain NWB RLE with moderate assistance within 7 day(s). Outcome: Progressing   PHYSICAL THERAPY TREATMENT    Patient: Nat Vega (7 y.o. male)  Date: 7/24/2023  Diagnosis: Closed displaced fracture of lateral malleolus of right fibula with routine healing [S82.61XD]  Tear of deltoid ligament of ankle, right, subsequent encounter [S93.421D]  Ankle fracture, right, closed, with routine healing, subsequent encounter [S82.891D] Ankle fracture, right, closed, with routine healing, subsequent encounter  Procedure(s) (LRB):  OPEN REDUCTION INTERNAL FIXATION RIGHT LATERAL MALLEOLUS FRACTURE WITH SYNDESMOSIS FIXATION  (POPLITEAL BLOCK, SAPHENOUS BLOCK) (Right) 4 Days Post-Op  Precautions: Weight Bearing, Fall Risk, Bed Alarm Right Lower Extremity Weight Bearing: Non Weight Bearing                  ASSESSMENT:  Patient continues to benefit from skilled PT services and is slowly progressing towards goals.  Pt reports improved L knee pain after cortisone injection. Denies pain in RLE. Endorses B wrist pain and R elbow pain. Poor standing balance with use RW and A x 2, unable to maintain NWB on RLE. Max A x 1 for lateral transfer bed<>chair with verbal cues to initiate fwd lean. Will need rehab         PLAN:  Patient continues to benefit from skilled intervention to address the above impairments. Continue treatment per established plan of care. Recommendation for discharge: (in order for the patient to meet his/her long term goals): Therapy up to 5 days/week in rehab facility    Other factors to consider for discharge: patient's current support system is unable to meet their requirements for physical assistance, poor safety awareness, high risk for falls, not safe to be alone, and new weight bearing restrictions limiting activity or patient is unable to maintain    IF patient discharges home will need the following DME: continuing to assess with progress       SUBJECTIVE:   Patient stated, \"doing ok. \"    OBJECTIVE DATA SUMMARY:   Critical Behavior:          Functional Mobility Training:  Bed Mobility:  Bed Mobility Training  Supine to Sit: Stand-by assistance; Additional time  Transfers:  Transfer Training  Interventions: Safety awareness training;Weight shifting training/pressure relief;Verbal cues  Stand to Sit: Maximum assistance;Assist X2;Additional time  Stand Pivot Transfers: Maximum assistance;Assist X2;Additional time  Sliding Board:  Moderate assistance;Assist X1;Additional time  Balance:  Balance  Sitting: Intact  Standing: Impaired  Standing - Static: Poor  Standing - Dynamic: Poor   Ambulation/Gait Training:     Gait  Assistive Device: Gait belt;Walker, rolling  Neuro Re-Education:                    Pain Ratin/10   Pain Intervention(s):   nursing notified    Activity Tolerance:   Good    After treatment:   Patient left in no apparent distress sitting up in chair, Call bell within reach, Bed/ chair alarm activated, and Caregiver / family present      COMMUNICATION/EDUCATION:   The patient's plan of care was discussed with: registered nurse    Patient Education  Education Given To: Patient; Family  Education Provided: Role of Therapy;Home Exercise Program;Transfer Training; Fall Prevention Strategies  Education Method: Verbal;Demonstration  Barriers to Learning: Cognition  Education Outcome: Continued education needed      Marylu Todd PTA  Minutes: 34

## 2023-07-25 VITALS
HEIGHT: 71 IN | TEMPERATURE: 98 F | HEART RATE: 83 BPM | DIASTOLIC BLOOD PRESSURE: 78 MMHG | SYSTOLIC BLOOD PRESSURE: 158 MMHG | WEIGHT: 202 LBS | OXYGEN SATURATION: 94 % | RESPIRATION RATE: 18 BRPM | BODY MASS INDEX: 28.28 KG/M2

## 2023-07-25 LAB
ANION GAP SERPL CALC-SCNC: 3 MMOL/L (ref 5–15)
BACTERIA SPEC CULT: NORMAL
BACTERIA SPEC CULT: NORMAL
BASOPHILS # BLD: 0.1 K/UL (ref 0–0.1)
BASOPHILS NFR BLD: 1 % (ref 0–1)
BUN SERPL-MCNC: 22 MG/DL (ref 6–20)
BUN/CREAT SERPL: 30 (ref 12–20)
CALCIUM SERPL-MCNC: 9.4 MG/DL (ref 8.5–10.1)
CHLORIDE SERPL-SCNC: 111 MMOL/L (ref 97–108)
CO2 SERPL-SCNC: 28 MMOL/L (ref 21–32)
CREAT SERPL-MCNC: 0.74 MG/DL (ref 0.7–1.3)
DIFFERENTIAL METHOD BLD: ABNORMAL
EOSINOPHIL # BLD: 0.1 K/UL (ref 0–0.4)
EOSINOPHIL NFR BLD: 1 % (ref 0–7)
ERYTHROCYTE [DISTWIDTH] IN BLOOD BY AUTOMATED COUNT: 13.2 % (ref 11.5–14.5)
GLUCOSE SERPL-MCNC: 133 MG/DL (ref 65–100)
HCT VFR BLD AUTO: 35.2 % (ref 36.6–50.3)
HGB BLD-MCNC: 10.8 G/DL (ref 12.1–17)
IMM GRANULOCYTES # BLD AUTO: 0 K/UL (ref 0–0.04)
IMM GRANULOCYTES NFR BLD AUTO: 0 % (ref 0–0.5)
LYMPHOCYTES # BLD: 1.6 K/UL (ref 0.8–3.5)
LYMPHOCYTES NFR BLD: 14 % (ref 12–49)
MCH RBC QN AUTO: 29.8 PG (ref 26–34)
MCHC RBC AUTO-ENTMCNC: 30.7 G/DL (ref 30–36.5)
MCV RBC AUTO: 97.2 FL (ref 80–99)
MONOCYTES # BLD: 0.9 K/UL (ref 0–1)
MONOCYTES NFR BLD: 9 % (ref 5–13)
NEUTS SEG # BLD: 8.1 K/UL (ref 1.8–8)
NEUTS SEG NFR BLD: 75 % (ref 32–75)
NRBC # BLD: 0 K/UL (ref 0–0.01)
NRBC BLD-RTO: 0 PER 100 WBC
PLATELET # BLD AUTO: 316 K/UL (ref 150–400)
PMV BLD AUTO: 11.1 FL (ref 8.9–12.9)
POTASSIUM SERPL-SCNC: 3.9 MMOL/L (ref 3.5–5.1)
RBC # BLD AUTO: 3.62 M/UL (ref 4.1–5.7)
SERVICE CMNT-IMP: NORMAL
SODIUM SERPL-SCNC: 142 MMOL/L (ref 136–145)
VANCOMYCIN SERPL-MCNC: 15.5 UG/ML
WBC # BLD AUTO: 10.9 K/UL (ref 4.1–11.1)

## 2023-07-25 PROCEDURE — 80048 BASIC METABOLIC PNL TOTAL CA: CPT

## 2023-07-25 PROCEDURE — 2580000003 HC RX 258: Performed by: ORTHOPAEDIC SURGERY

## 2023-07-25 PROCEDURE — 36415 COLL VENOUS BLD VENIPUNCTURE: CPT

## 2023-07-25 PROCEDURE — 6370000000 HC RX 637 (ALT 250 FOR IP): Performed by: INTERNAL MEDICINE

## 2023-07-25 PROCEDURE — 2580000003 HC RX 258: Performed by: INTERNAL MEDICINE

## 2023-07-25 PROCEDURE — 98960 EDU&TRN PT SELF-MGMT NQHP 1: CPT

## 2023-07-25 PROCEDURE — 85025 COMPLETE CBC W/AUTO DIFF WBC: CPT

## 2023-07-25 PROCEDURE — 80202 ASSAY OF VANCOMYCIN: CPT

## 2023-07-25 PROCEDURE — 6370000000 HC RX 637 (ALT 250 FOR IP): Performed by: STUDENT IN AN ORGANIZED HEALTH CARE EDUCATION/TRAINING PROGRAM

## 2023-07-25 PROCEDURE — 6360000002 HC RX W HCPCS: Performed by: INTERNAL MEDICINE

## 2023-07-25 PROCEDURE — 94761 N-INVAS EAR/PLS OXIMETRY MLT: CPT

## 2023-07-25 PROCEDURE — 6370000000 HC RX 637 (ALT 250 FOR IP): Performed by: ORTHOPAEDIC SURGERY

## 2023-07-25 RX ORDER — METOPROLOL SUCCINATE 50 MG/1
50 TABLET, EXTENDED RELEASE ORAL DAILY
Status: DISCONTINUED | OUTPATIENT
Start: 2023-07-26 | End: 2023-07-25 | Stop reason: HOSPADM

## 2023-07-25 RX ORDER — AMOXICILLIN AND CLAVULANATE POTASSIUM 875; 125 MG/1; MG/1
1 TABLET, FILM COATED ORAL EVERY 12 HOURS SCHEDULED
Qty: 14 TABLET | Refills: 0 | Status: SHIPPED | OUTPATIENT
Start: 2023-07-25 | End: 2023-08-01

## 2023-07-25 RX ORDER — ALUMINUM ZIRCONIUM OCTACHLOROHYDREX GLY 16 G/100G
1 GEL TOPICAL DAILY
Status: DISCONTINUED | OUTPATIENT
Start: 2023-07-25 | End: 2023-07-25 | Stop reason: HOSPADM

## 2023-07-25 RX ORDER — AMOXICILLIN AND CLAVULANATE POTASSIUM 875; 125 MG/1; MG/1
1 TABLET, FILM COATED ORAL EVERY 12 HOURS SCHEDULED
Status: DISCONTINUED | OUTPATIENT
Start: 2023-07-25 | End: 2023-07-25 | Stop reason: HOSPADM

## 2023-07-25 RX ORDER — ALUMINUM ZIRCONIUM OCTACHLOROHYDREX GLY 16 G/100G
1 GEL TOPICAL DAILY
Qty: 30 CAPSULE | Refills: 0 | Status: SHIPPED | OUTPATIENT
Start: 2023-07-25 | End: 2023-08-24

## 2023-07-25 RX ADMIN — SODIUM CHLORIDE, PRESERVATIVE FREE 10 ML: 5 INJECTION INTRAVENOUS at 08:22

## 2023-07-25 RX ADMIN — POTASSIUM CHLORIDE 10 MEQ: 750 TABLET, FILM COATED, EXTENDED RELEASE ORAL at 08:21

## 2023-07-25 RX ADMIN — GABAPENTIN 600 MG: 300 CAPSULE ORAL at 14:14

## 2023-07-25 RX ADMIN — METOPROLOL SUCCINATE 75 MG: 50 TABLET, EXTENDED RELEASE ORAL at 08:21

## 2023-07-25 RX ADMIN — ALLOPURINOL 100 MG: 100 TABLET ORAL at 08:20

## 2023-07-25 RX ADMIN — SENNOSIDES AND DOCUSATE SODIUM 1 TABLET: 50; 8.6 TABLET ORAL at 08:20

## 2023-07-25 RX ADMIN — AMOXICILLIN AND CLAVULANATE POTASSIUM 1 TABLET: 875; 125 TABLET, FILM COATED ORAL at 11:30

## 2023-07-25 RX ADMIN — SODIUM CHLORIDE, PRESERVATIVE FREE 10 ML: 5 INJECTION INTRAVENOUS at 08:27

## 2023-07-25 RX ADMIN — COLCHICINE 0.6 MG: 0.6 TABLET, FILM COATED ORAL at 08:21

## 2023-07-25 RX ADMIN — CEFEPIME 2000 MG: 2 INJECTION, POWDER, FOR SOLUTION INTRAVENOUS at 08:23

## 2023-07-25 RX ADMIN — RIVAROXABAN 20 MG: 20 TABLET, FILM COATED ORAL at 08:22

## 2023-07-25 RX ADMIN — Medication 1 CAPSULE: at 11:30

## 2023-07-25 RX ADMIN — FOLIC ACID 1 MG: 1 TABLET ORAL at 08:21

## 2023-07-25 RX ADMIN — ASPIRIN 81 MG: 81 TABLET, COATED ORAL at 08:20

## 2023-07-25 RX ADMIN — CYANOCOBALAMIN TAB 500 MCG 2500 MCG: 500 TAB at 08:20

## 2023-07-25 RX ADMIN — GABAPENTIN 600 MG: 300 CAPSULE ORAL at 08:21

## 2023-07-25 RX ADMIN — METRONIDAZOLE 500 MG: 500 INJECTION, SOLUTION INTRAVENOUS at 07:18

## 2023-07-25 RX ADMIN — Medication 100 MG: at 08:20

## 2023-07-25 RX ADMIN — FUROSEMIDE 10 MG: 20 TABLET ORAL at 08:21

## 2023-07-25 RX ADMIN — VANCOMYCIN HYDROCHLORIDE 1250 MG: 1.25 INJECTION, POWDER, LYOPHILIZED, FOR SOLUTION INTRAVENOUS at 04:45

## 2023-07-25 ASSESSMENT — PAIN SCALES - GENERAL
PAINLEVEL_OUTOF10: 0
PAINLEVEL_OUTOF10: 0

## 2023-07-25 NOTE — PROGRESS NOTES
Ultrasound IV by Loran Duane, RN :  Procedure Note    Ultrasound IV education provided to patient. Opportunities for questions given. Ultrasound used for PIV placement:  22gauge 1.0 cm BD Nexiva. RT A/C location. 1 X Attempt(s). Flushed with ease; vigorous blood return. Procedure tolerated well. Primary RN aware of IV placement and added to LDA.       Loran Duane, RN

## 2023-07-25 NOTE — PLAN OF CARE
Problem: Pain  Goal: Verbalizes/displays adequate comfort level or baseline comfort level  Outcome: HH/HSPC Progressing     Problem: Safety - Adult  Goal: Free from fall injury  Outcome: [address] [facility] [address] Progressing     Problem: Chronic Conditions and Co-morbidities  Goal: Patient's chronic conditions and co-morbidity symptoms are monitored and maintained or improved  Outcome: [address] [facility] 114 Progressing     Problem: Discharge Planning  Goal: Discharge to home or other facility with appropriate resources  Outcome: [address] [facility] 114 Progressing     Problem: Skin/Tissue Integrity  Goal: Absence of new skin breakdown  Description: 1. Monitor for areas of redness and/or skin breakdown  2. Assess vascular access sites hourly  3. Every 4-6 hours minimum:  Change oxygen saturation probe site  4. Every 4-6 hours:  If on nasal continuous positive airway pressure, respiratory therapy assess nares and determine need for appliance change or resting period.   Outcome: [address] [facility] 114 Progressing

## 2023-07-25 NOTE — CARE COORDINATION
7/25/2023 1:40PM. CM called to [facility], pt's transport has been changed to [facility] and new ETA is 4PM.   CM updated pt's wife and pt's RN is aware. 7/25/2023 11:36 AM   Care Management Progress Note      ICD-10-CM    1. Cardiac valvulopathy  I38 Transthoracic echocardiogram (TTE) complete with contrast, bubble, strain, and 3D PRN     Transthoracic echocardiogram (TTE) complete with contrast, bubble, strain, and 3D PRN      2. Ankle fracture, right, closed, with routine healing, subsequent encounter  S82.891D oxyCODONE (ROXICODONE) 5 MG immediate release tablet          RUR: 11%   Risk Level: [x]Low []Moderate []High  Value-based purchasing: [] Yes [x] No  Bundle patient: [] Yes [x] No   Specify:     Transition of care plan:  Discharge today to [facility] [address] please call report to [facility] at [phone]  Pt has a post op visit with Carri Zaragoza on 7/28 at [address] [facility] on [address]. [facility] liaison has been notified of appointment. CM provided pt's wife [facility] contact information for transport    Outpatient follow-up. [facility] scheduled for 1PM. PCS sent to [facility] via [employer].         07/25/23 [address] Discharge   Transition of Care Consult (CM Consult) [address] Discharge Inpatient rehab

## 2023-07-25 NOTE — PROGRESS NOTES
Called [facility] and gave report to [employer]. SBAR was given, new medications were explained and reviewed, and continuation of pt care was discussed. Opportunity for questions and clarification offered.

## 2023-07-25 NOTE — PROGRESS NOTES
Ortho F/u Visit    Discussed case with Dr. Kash Hamilton (hospitalist) and AFUA Ocampo for Dr. Jeannie Baxter. Both state patient is ready for discharge today from their perspectives. Case management arranged placement at skilled nursing facility with transport set for 1pm today. Discharge order placed. Follow diet guidelines by speech therapy. Follow up with Dr. Jeannie Baxter and PCP as directed.      Elmo Fowler NP

## 2023-07-26 NOTE — PROGRESS NOTES
[facility] arrived to transport patient to [facility] rehab. Wife at side. RN removed patient PIV. Discharge paperwork had already been discussed with patient and wife from previous bedside RN. Patient VSS at time of discharge.

## 2023-07-27 NOTE — PROGRESS NOTES
Physician Progress Note      PATIENT:               Gagan Mckeon  CSN #:                  [id number]  :                       1945  ADMIT DATE:       2023 9:16 AM  DISCH DATE:        2023 4:55 PM  RESPONDING  PROVIDER #:        Essence Ferrell MD          QUERY TEXT:    Good morning. Pt was admitted from 23-23. Internal Medicine was consulted on   for medical management. IM PN dated  states \"Sepsis; fevers,   tachycardia with suspected PNA. If possible, please clarify if you were treating any of the following. The medical record reflects the following:    Risk Factors: 69 y.o. male with Hx of factor 5 leiden deficiency, preDM, COPD,   HTN, gout, etoh abuse, peripheral neuropathy, insomnia, right lat malleolus   fracture    Clinical Indicators: from  IM PN: \"Sepsis; fevers, tachycardia with   suspected PNA. Lactic acid within normal limits. Follow blood cultures. CXR   w/ concern for PNA. UA no e/o of infection. CT abdomen pelvis wo no acute   concerns. Cover with empiric antibiotics including IV vancomycin and   cefepime. Add IV flagyl. IF MRSA neg; stop IV Vanc. Discussed with Ortho who   have no concerns for any necessary joint aspirations for culture. \";    13:01 VS: temp 101.4, HR >100;  CXR: Possible mild or developing   infiltrate in the right midlung zone. Close follow-up is suggested\"; blood cx   NGTD; RVP negative; MRSA negative; from  PN: \"Sepsis: fevers, tachycardia   with suspected PNA. Lactic acid within normal limits. blood cultures neg. CXR w/ concern for PNA. UA no e/o of infection. CT abdomen pelvis wo no acute   concerns. Will change empiric  IV vancomycin and cefepime add IV flagyl to   oral Augmentin with probiotic for discharge. MRSA pending but low s/f for   MRSA; stop IV Vanc. Discussed with Ortho over weekend who have no concerns   for any necessary joint aspirations for culture.  Speech evaluated; continue diet per speech with aspiration precaution\"; from 07/25 Orthopedic NP PN:   :Discussed case with Dr. Marie Black (hospitalist) and AFUA Morgan for Dr. Silvana Lira. Both state patient is ready for discharge today from their   perspectives. Case management arranged placement at skilled nursing facility   with transport set for 1pm today. Discharge order placed. Follow diet   guidelines by speech therapy.  Follow up with Dr. Silvana Lira and PCP as directed\"    Treatment: Internal Medicine consult, vital signs per unit protocol, CXR,   blood cx, MRSA cx, RVP, IV Vancomycin, IV Flagyl, IV Cefepime      Thank you,  Gerry Malloy RN, CDI  Options provided:  -- Sepsis with suspected pneumonia confirmed not present on admission  -- Sepsis with suspected pneumonia ruled out  -- Other - I will add my own diagnosis  -- Disagree - Not applicable / Not valid  -- Disagree - Clinically unable to determine / Unknown  -- Refer to Clinical Documentation Reviewer    PROVIDER RESPONSE TEXT:    Pt had suspected PNA per CXR, blood cxs neg, pt treated with empiric IV abx in   house, d/c'd on empiric oral Abx    Query created by: Gerry Malloy on 7/26/2023 9:45 AM      Electronically signed by:  Kimi Ramirez MD 7/27/2023 3:59 PM

## 2023-07-29 LAB
BACTERIA SPEC CULT: NORMAL
BACTERIA SPEC CULT: NORMAL
SERVICE CMNT-IMP: NORMAL
SERVICE CMNT-IMP: NORMAL

## 2024-02-19 ENCOUNTER — HOSPITAL ENCOUNTER (INPATIENT)
Facility: HOSPITAL | Age: 79
LOS: 9 days | Discharge: NURSING, REHAB OR LONG-TERM CARE | DRG: 478 | End: 2024-02-28
Attending: PEDIATRICS | Admitting: FAMILY MEDICINE
Payer: MEDICARE

## 2024-02-19 ENCOUNTER — APPOINTMENT (OUTPATIENT)
Dept: VASCULAR SURGERY | Facility: HOSPITAL | Age: 79
DRG: 478 | End: 2024-02-19
Payer: MEDICARE

## 2024-02-19 ENCOUNTER — APPOINTMENT (OUTPATIENT)
Facility: HOSPITAL | Age: 79
DRG: 478 | End: 2024-02-19
Payer: MEDICARE

## 2024-02-19 DIAGNOSIS — I82.431 ACUTE DEEP VEIN THROMBOSIS (DVT) OF POPLITEAL VEIN OF RIGHT LOWER EXTREMITY (HCC): ICD-10-CM

## 2024-02-19 DIAGNOSIS — L03.115 CELLULITIS OF RIGHT LOWER EXTREMITY: Primary | ICD-10-CM

## 2024-02-19 PROBLEM — S91.002A ANKLE WOUND, LEFT, INITIAL ENCOUNTER: Status: ACTIVE | Noted: 2024-02-19

## 2024-02-19 LAB
ALBUMIN SERPL-MCNC: 2.6 G/DL (ref 3.5–5)
ALBUMIN/GLOB SERPL: 0.5 (ref 1.1–2.2)
ALP SERPL-CCNC: 123 U/L (ref 45–117)
ALT SERPL-CCNC: 18 U/L (ref 12–78)
ANION GAP SERPL CALC-SCNC: 2 MMOL/L (ref 5–15)
AST SERPL-CCNC: 18 U/L (ref 15–37)
BASOPHILS # BLD: 0.1 K/UL (ref 0–0.1)
BASOPHILS NFR BLD: 1 % (ref 0–1)
BILIRUB SERPL-MCNC: 0.3 MG/DL (ref 0.2–1)
BUN SERPL-MCNC: 26 MG/DL (ref 6–20)
BUN/CREAT SERPL: 22 (ref 12–20)
CALCIUM SERPL-MCNC: 8.7 MG/DL (ref 8.5–10.1)
CHLORIDE SERPL-SCNC: 107 MMOL/L (ref 97–108)
CO2 SERPL-SCNC: 30 MMOL/L (ref 21–32)
COMMENT:: NORMAL
CREAT SERPL-MCNC: 1.2 MG/DL (ref 0.7–1.3)
CRP SERPL-MCNC: 4.3 MG/DL (ref 0–0.3)
DIFFERENTIAL METHOD BLD: NORMAL
EOSINOPHIL # BLD: 0.2 K/UL (ref 0–0.4)
EOSINOPHIL NFR BLD: 3 % (ref 0–7)
ERYTHROCYTE [DISTWIDTH] IN BLOOD BY AUTOMATED COUNT: 13.2 % (ref 11.5–14.5)
ERYTHROCYTE [SEDIMENTATION RATE] IN BLOOD: 76 MM/HR (ref 0–20)
GLOBULIN SER CALC-MCNC: 5.1 G/DL (ref 2–4)
GLUCOSE SERPL-MCNC: 102 MG/DL (ref 65–100)
HCT VFR BLD AUTO: 40.3 % (ref 36.6–50.3)
HGB BLD-MCNC: 13 G/DL (ref 12.1–17)
IMM GRANULOCYTES # BLD AUTO: 0 K/UL (ref 0–0.04)
IMM GRANULOCYTES NFR BLD AUTO: 0 % (ref 0–0.5)
LACTATE BLD-SCNC: 1.02 MMOL/L (ref 0.4–2)
LYMPHOCYTES # BLD: 1.4 K/UL (ref 0.8–3.5)
LYMPHOCYTES NFR BLD: 16 % (ref 12–49)
MCH RBC QN AUTO: 29.7 PG (ref 26–34)
MCHC RBC AUTO-ENTMCNC: 32.3 G/DL (ref 30–36.5)
MCV RBC AUTO: 92 FL (ref 80–99)
MONOCYTES # BLD: 1 K/UL (ref 0–1)
MONOCYTES NFR BLD: 11 % (ref 5–13)
NEUTS SEG # BLD: 6 K/UL (ref 1.8–8)
NEUTS SEG NFR BLD: 69 % (ref 32–75)
NRBC # BLD: 0 K/UL (ref 0–0.01)
NRBC BLD-RTO: 0 PER 100 WBC
PLATELET # BLD AUTO: 371 K/UL (ref 150–400)
PMV BLD AUTO: 10.5 FL (ref 8.9–12.9)
POTASSIUM SERPL-SCNC: 4.6 MMOL/L (ref 3.5–5.1)
PROCALCITONIN SERPL-MCNC: <0.05 NG/ML
PROT SERPL-MCNC: 7.7 G/DL (ref 6.4–8.2)
RBC # BLD AUTO: 4.38 M/UL (ref 4.1–5.7)
SODIUM SERPL-SCNC: 139 MMOL/L (ref 136–145)
SPECIMEN HOLD: NORMAL
WBC # BLD AUTO: 8.6 K/UL (ref 4.1–11.1)

## 2024-02-19 PROCEDURE — 1100000000 HC RM PRIVATE

## 2024-02-19 PROCEDURE — 73701 CT LOWER EXTREMITY W/DYE: CPT

## 2024-02-19 PROCEDURE — 93005 ELECTROCARDIOGRAM TRACING: CPT

## 2024-02-19 PROCEDURE — 84145 PROCALCITONIN (PCT): CPT

## 2024-02-19 PROCEDURE — 87205 SMEAR GRAM STAIN: CPT

## 2024-02-19 PROCEDURE — 96365 THER/PROPH/DIAG IV INF INIT: CPT

## 2024-02-19 PROCEDURE — 93970 EXTREMITY STUDY: CPT

## 2024-02-19 PROCEDURE — 99285 EMERGENCY DEPT VISIT HI MDM: CPT

## 2024-02-19 PROCEDURE — 85025 COMPLETE CBC W/AUTO DIFF WBC: CPT

## 2024-02-19 PROCEDURE — 86140 C-REACTIVE PROTEIN: CPT

## 2024-02-19 PROCEDURE — 2580000003 HC RX 258: Performed by: FAMILY MEDICINE

## 2024-02-19 PROCEDURE — 6360000002 HC RX W HCPCS

## 2024-02-19 PROCEDURE — 2580000003 HC RX 258

## 2024-02-19 PROCEDURE — 96367 TX/PROPH/DG ADDL SEQ IV INF: CPT

## 2024-02-19 PROCEDURE — 80053 COMPREHEN METABOLIC PANEL: CPT

## 2024-02-19 PROCEDURE — 73610 X-RAY EXAM OF ANKLE: CPT

## 2024-02-19 PROCEDURE — 6360000004 HC RX CONTRAST MEDICATION

## 2024-02-19 PROCEDURE — APPNB60 APP NON BILLABLE TIME 46-60 MINS: Performed by: PHYSICIAN ASSISTANT

## 2024-02-19 PROCEDURE — 85652 RBC SED RATE AUTOMATED: CPT

## 2024-02-19 PROCEDURE — 87040 BLOOD CULTURE FOR BACTERIA: CPT

## 2024-02-19 PROCEDURE — 6370000000 HC RX 637 (ALT 250 FOR IP): Performed by: FAMILY MEDICINE

## 2024-02-19 PROCEDURE — 36415 COLL VENOUS BLD VENIPUNCTURE: CPT

## 2024-02-19 PROCEDURE — 87070 CULTURE OTHR SPECIMN AEROBIC: CPT

## 2024-02-19 PROCEDURE — 83605 ASSAY OF LACTIC ACID: CPT

## 2024-02-19 RX ORDER — ASPIRIN 81 MG/1
81 TABLET ORAL DAILY
Status: DISCONTINUED | OUTPATIENT
Start: 2024-02-20 | End: 2024-02-28 | Stop reason: HOSPADM

## 2024-02-19 RX ORDER — SODIUM CHLORIDE 0.9 % (FLUSH) 0.9 %
5-40 SYRINGE (ML) INJECTION PRN
Status: DISCONTINUED | OUTPATIENT
Start: 2024-02-19 | End: 2024-02-28 | Stop reason: HOSPADM

## 2024-02-19 RX ORDER — ALLOPURINOL 100 MG/1
100 TABLET ORAL DAILY
Status: DISCONTINUED | OUTPATIENT
Start: 2024-02-20 | End: 2024-02-28 | Stop reason: HOSPADM

## 2024-02-19 RX ORDER — POLYETHYLENE GLYCOL 3350 17 G/17G
17 POWDER, FOR SOLUTION ORAL DAILY PRN
Status: DISCONTINUED | OUTPATIENT
Start: 2024-02-19 | End: 2024-02-28 | Stop reason: HOSPADM

## 2024-02-19 RX ORDER — ACETAMINOPHEN 650 MG/1
650 SUPPOSITORY RECTAL EVERY 6 HOURS PRN
Status: DISCONTINUED | OUTPATIENT
Start: 2024-02-19 | End: 2024-02-28 | Stop reason: HOSPADM

## 2024-02-19 RX ORDER — SODIUM CHLORIDE 9 MG/ML
INJECTION, SOLUTION INTRAVENOUS CONTINUOUS
Status: DISPENSED | OUTPATIENT
Start: 2024-02-20 | End: 2024-02-21

## 2024-02-19 RX ORDER — ACETAMINOPHEN 325 MG/1
650 TABLET ORAL EVERY 6 HOURS PRN
Status: DISCONTINUED | OUTPATIENT
Start: 2024-02-19 | End: 2024-02-28 | Stop reason: HOSPADM

## 2024-02-19 RX ORDER — GABAPENTIN 300 MG/1
1200 CAPSULE ORAL 2 TIMES DAILY
Status: DISCONTINUED | OUTPATIENT
Start: 2024-02-19 | End: 2024-02-28 | Stop reason: HOSPADM

## 2024-02-19 RX ORDER — ONDANSETRON 4 MG/1
4 TABLET, ORALLY DISINTEGRATING ORAL EVERY 8 HOURS PRN
Status: DISCONTINUED | OUTPATIENT
Start: 2024-02-19 | End: 2024-02-28 | Stop reason: HOSPADM

## 2024-02-19 RX ORDER — ONDANSETRON 2 MG/ML
4 INJECTION INTRAMUSCULAR; INTRAVENOUS EVERY 6 HOURS PRN
Status: DISCONTINUED | OUTPATIENT
Start: 2024-02-19 | End: 2024-02-28 | Stop reason: HOSPADM

## 2024-02-19 RX ORDER — SODIUM CHLORIDE 9 MG/ML
INJECTION, SOLUTION INTRAVENOUS PRN
Status: DISCONTINUED | OUTPATIENT
Start: 2024-02-19 | End: 2024-02-28 | Stop reason: HOSPADM

## 2024-02-19 RX ORDER — HYDROMORPHONE HYDROCHLORIDE 1 MG/ML
1 INJECTION, SOLUTION INTRAMUSCULAR; INTRAVENOUS; SUBCUTANEOUS EVERY 4 HOURS PRN
Status: DISCONTINUED | OUTPATIENT
Start: 2024-02-19 | End: 2024-02-28 | Stop reason: HOSPADM

## 2024-02-19 RX ORDER — SODIUM CHLORIDE 0.9 % (FLUSH) 0.9 %
5-40 SYRINGE (ML) INJECTION EVERY 12 HOURS SCHEDULED
Status: DISCONTINUED | OUTPATIENT
Start: 2024-02-19 | End: 2024-02-28 | Stop reason: HOSPADM

## 2024-02-19 RX ORDER — METOPROLOL SUCCINATE 50 MG/1
50 TABLET, EXTENDED RELEASE ORAL DAILY
Status: DISCONTINUED | OUTPATIENT
Start: 2024-02-20 | End: 2024-02-28 | Stop reason: HOSPADM

## 2024-02-19 RX ADMIN — GABAPENTIN 1200 MG: 300 CAPSULE ORAL at 20:31

## 2024-02-19 RX ADMIN — RIVAROXABAN 20 MG: 20 TABLET, FILM COATED ORAL at 20:52

## 2024-02-19 RX ADMIN — SODIUM CHLORIDE, PRESERVATIVE FREE 5 ML: 5 INJECTION INTRAVENOUS at 23:06

## 2024-02-19 RX ADMIN — CEFEPIME 2000 MG: 2 INJECTION, POWDER, FOR SOLUTION INTRAVENOUS at 18:19

## 2024-02-19 RX ADMIN — VANCOMYCIN HYDROCHLORIDE 2250 MG: 10 INJECTION, POWDER, LYOPHILIZED, FOR SOLUTION INTRAVENOUS at 19:15

## 2024-02-19 RX ADMIN — IOPAMIDOL 100 ML: 755 INJECTION, SOLUTION INTRAVENOUS at 21:44

## 2024-02-19 ASSESSMENT — PAIN SCALES - GENERAL: PAINLEVEL_OUTOF10: 0

## 2024-02-19 ASSESSMENT — PAIN - FUNCTIONAL ASSESSMENT: PAIN_FUNCTIONAL_ASSESSMENT: 0-10
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
## 2024-02-19 NOTE — ED TRIAGE NOTES
Pt arrives to the ER for complaints possible infection to right ankle. Pt states that he was at his doctors office today who noticed drainage coming from the wound and referred him to the ER.    Pt states that he has swelling to the right leg as well.     Pt states that he had an operation to the right ankle in July.    Pt states that last Wednesday he had a fever while he was in [place] and was treated in an ER.

## 2024-02-20 PROBLEM — L03.115 CELLULITIS OF RIGHT LOWER EXTREMITY: Status: ACTIVE | Noted: 2024-02-20

## 2024-02-20 LAB
ANION GAP SERPL CALC-SCNC: 1 MMOL/L (ref 5–15)
BASOPHILS # BLD: 0.1 K/UL (ref 0–0.1)
BASOPHILS NFR BLD: 1 % (ref 0–1)
BUN SERPL-MCNC: 23 MG/DL (ref 6–20)
BUN/CREAT SERPL: 24 (ref 12–20)
CALCIUM SERPL-MCNC: 8.2 MG/DL (ref 8.5–10.1)
CHLORIDE SERPL-SCNC: 109 MMOL/L (ref 97–108)
CO2 SERPL-SCNC: 29 MMOL/L (ref 21–32)
CREAT SERPL-MCNC: 0.96 MG/DL (ref 0.7–1.3)
CRP SERPL-MCNC: 3.43 MG/DL (ref 0–0.3)
DIFFERENTIAL METHOD BLD: ABNORMAL
ECHO BSA: 2.13 M2
EOSINOPHIL # BLD: 0.2 K/UL (ref 0–0.4)
EOSINOPHIL NFR BLD: 3 % (ref 0–7)
ERYTHROCYTE [DISTWIDTH] IN BLOOD BY AUTOMATED COUNT: 13.3 % (ref 11.5–14.5)
ERYTHROCYTE [SEDIMENTATION RATE] IN BLOOD: 63 MM/HR (ref 0–20)
GLUCOSE BLD STRIP.AUTO-MCNC: 140 MG/DL (ref 65–117)
GLUCOSE BLD STRIP.AUTO-MCNC: 170 MG/DL (ref 65–117)
GLUCOSE SERPL-MCNC: 135 MG/DL (ref 65–100)
HCT VFR BLD AUTO: 35.2 % (ref 36.6–50.3)
HGB BLD-MCNC: 11.3 G/DL (ref 12.1–17)
IMM GRANULOCYTES # BLD AUTO: 0 K/UL (ref 0–0.04)
IMM GRANULOCYTES NFR BLD AUTO: 1 % (ref 0–0.5)
LYMPHOCYTES # BLD: 1.7 K/UL (ref 0.8–3.5)
LYMPHOCYTES NFR BLD: 22 % (ref 12–49)
MCH RBC QN AUTO: 29.3 PG (ref 26–34)
MCHC RBC AUTO-ENTMCNC: 32.1 G/DL (ref 30–36.5)
MCV RBC AUTO: 91.2 FL (ref 80–99)
MONOCYTES # BLD: 0.8 K/UL (ref 0–1)
MONOCYTES NFR BLD: 10 % (ref 5–13)
NEUTS SEG # BLD: 5.2 K/UL (ref 1.8–8)
NEUTS SEG NFR BLD: 63 % (ref 32–75)
NRBC # BLD: 0 K/UL (ref 0–0.01)
NRBC BLD-RTO: 0 PER 100 WBC
PLATELET # BLD AUTO: 323 K/UL (ref 150–400)
PMV BLD AUTO: 10.8 FL (ref 8.9–12.9)
POTASSIUM SERPL-SCNC: 4.6 MMOL/L (ref 3.5–5.1)
RBC # BLD AUTO: 3.86 M/UL (ref 4.1–5.7)
SERVICE CMNT-IMP: ABNORMAL
SERVICE CMNT-IMP: ABNORMAL
SODIUM SERPL-SCNC: 139 MMOL/L (ref 136–145)
WBC # BLD AUTO: 8 K/UL (ref 4.1–11.1)

## 2024-02-20 PROCEDURE — 6370000000 HC RX 637 (ALT 250 FOR IP): Performed by: FAMILY MEDICINE

## 2024-02-20 PROCEDURE — 6370000000 HC RX 637 (ALT 250 FOR IP): Performed by: NURSE PRACTITIONER

## 2024-02-20 PROCEDURE — 99223 1ST HOSP IP/OBS HIGH 75: CPT | Performed by: PHYSICIAN ASSISTANT

## 2024-02-20 PROCEDURE — 85025 COMPLETE CBC W/AUTO DIFF WBC: CPT

## 2024-02-20 PROCEDURE — 86922 COMPATIBILITY TEST ANTIGLOB: CPT

## 2024-02-20 PROCEDURE — 6370000000 HC RX 637 (ALT 250 FOR IP): Performed by: INTERNAL MEDICINE

## 2024-02-20 PROCEDURE — 86900 BLOOD TYPING SEROLOGIC ABO: CPT

## 2024-02-20 PROCEDURE — 36415 COLL VENOUS BLD VENIPUNCTURE: CPT

## 2024-02-20 PROCEDURE — 2580000003 HC RX 258: Performed by: FAMILY MEDICINE

## 2024-02-20 PROCEDURE — 99222 1ST HOSP IP/OBS MODERATE 55: CPT | Performed by: INTERNAL MEDICINE

## 2024-02-20 PROCEDURE — 6360000002 HC RX W HCPCS: Performed by: FAMILY MEDICINE

## 2024-02-20 PROCEDURE — 86901 BLOOD TYPING SEROLOGIC RH(D): CPT

## 2024-02-20 PROCEDURE — 86140 C-REACTIVE PROTEIN: CPT

## 2024-02-20 PROCEDURE — 86870 RBC ANTIBODY IDENTIFICATION: CPT

## 2024-02-20 PROCEDURE — 80048 BASIC METABOLIC PNL TOTAL CA: CPT

## 2024-02-20 PROCEDURE — 82962 GLUCOSE BLOOD TEST: CPT

## 2024-02-20 PROCEDURE — 87205 SMEAR GRAM STAIN: CPT

## 2024-02-20 PROCEDURE — 85652 RBC SED RATE AUTOMATED: CPT

## 2024-02-20 PROCEDURE — 86921 COMPATIBILITY TEST INCUBATE: CPT

## 2024-02-20 PROCEDURE — 6360000002 HC RX W HCPCS: Performed by: INTERNAL MEDICINE

## 2024-02-20 PROCEDURE — 86920 COMPATIBILITY TEST SPIN: CPT

## 2024-02-20 PROCEDURE — 86850 RBC ANTIBODY SCREEN: CPT

## 2024-02-20 PROCEDURE — 87070 CULTURE OTHR SPECIMN AEROBIC: CPT

## 2024-02-20 PROCEDURE — 94761 N-INVAS EAR/PLS OXIMETRY MLT: CPT

## 2024-02-20 PROCEDURE — 1100000000 HC RM PRIVATE

## 2024-02-20 RX ORDER — LANOLIN ALCOHOL/MO/W.PET/CERES
6 CREAM (GRAM) TOPICAL NIGHTLY PRN
Status: DISCONTINUED | OUTPATIENT
Start: 2024-02-20 | End: 2024-02-28 | Stop reason: HOSPADM

## 2024-02-20 RX ORDER — METRONIDAZOLE 500 MG/100ML
500 INJECTION, SOLUTION INTRAVENOUS EVERY 8 HOURS
Status: DISCONTINUED | OUTPATIENT
Start: 2024-02-20 | End: 2024-02-26

## 2024-02-20 RX ORDER — ROSUVASTATIN CALCIUM 10 MG/1
10 TABLET, COATED ORAL NIGHTLY
Status: DISCONTINUED | OUTPATIENT
Start: 2024-02-20 | End: 2024-02-28 | Stop reason: HOSPADM

## 2024-02-20 RX ADMIN — CEFEPIME 2000 MG: 2 INJECTION, POWDER, FOR SOLUTION INTRAVENOUS at 22:04

## 2024-02-20 RX ADMIN — SODIUM CHLORIDE: 9 INJECTION, SOLUTION INTRAVENOUS at 00:44

## 2024-02-20 RX ADMIN — SODIUM CHLORIDE, PRESERVATIVE FREE 10 ML: 5 INJECTION INTRAVENOUS at 20:33

## 2024-02-20 RX ADMIN — METRONIDAZOLE 500 MG: 500 INJECTION, SOLUTION INTRAVENOUS at 12:40

## 2024-02-20 RX ADMIN — METRONIDAZOLE 500 MG: 500 INJECTION, SOLUTION INTRAVENOUS at 20:44

## 2024-02-20 RX ADMIN — VANCOMYCIN HYDROCHLORIDE 750 MG: 750 INJECTION, POWDER, LYOPHILIZED, FOR SOLUTION INTRAVENOUS at 07:33

## 2024-02-20 RX ADMIN — Medication 6 MG: at 00:56

## 2024-02-20 RX ADMIN — CEFEPIME 2000 MG: 2 INJECTION, POWDER, FOR SOLUTION INTRAVENOUS at 14:54

## 2024-02-20 RX ADMIN — GABAPENTIN 1200 MG: 300 CAPSULE ORAL at 20:32

## 2024-02-20 RX ADMIN — ALLOPURINOL 100 MG: 100 TABLET ORAL at 07:30

## 2024-02-20 RX ADMIN — VANCOMYCIN HYDROCHLORIDE 750 MG: 750 INJECTION, POWDER, LYOPHILIZED, FOR SOLUTION INTRAVENOUS at 20:39

## 2024-02-20 RX ADMIN — METOPROLOL SUCCINATE 50 MG: 50 TABLET, EXTENDED RELEASE ORAL at 07:29

## 2024-02-20 RX ADMIN — ASPIRIN 81 MG: 81 TABLET, COATED ORAL at 07:30

## 2024-02-20 RX ADMIN — Medication 6 MG: at 20:35

## 2024-02-20 RX ADMIN — SODIUM CHLORIDE: 9 INJECTION, SOLUTION INTRAVENOUS at 16:03

## 2024-02-20 RX ADMIN — ROSUVASTATIN CALCIUM 10 MG: 10 TABLET, COATED ORAL at 20:32

## 2024-02-20 RX ADMIN — GABAPENTIN 1200 MG: 300 CAPSULE ORAL at 07:30

## 2024-02-20 RX ADMIN — ACETAMINOPHEN 650 MG: 325 TABLET ORAL at 07:30

## 2024-02-20 RX ADMIN — RIVAROXABAN 20 MG: 20 TABLET, FILM COATED ORAL at 18:02

## 2024-02-20 NOTE — SIGNIFICANT EVENT
Notified by nursing of posted results of Venous duplex, positive for Right DVT- pt currently on Xarelto, hx of Factor 5 Kirt Baker- Discussed with attending, will add consult to Heme for assistance.

## 2024-02-20 NOTE — H&P
Right ankle wound  -No sign of osteomyelitis on imaging  -Consult orthopedics, Dr. Martinez  -Empiric antibiotics  -Follow cultures  -IV Dilaudid as needed for pain    Gout  -Continue allopurinol    Peripheral neuropathy  -Continue gabapentin, nortriptyline    Type 2 diabetes  -Recent A1c 6.3    Hypertension  Hyperlipidemia  -Continue Crestor, metoprolol    Hx of factor V Leiden  -Continue Xarelto    Aortic valve stenosis  -Hx of TAVR and January 2023      Diet: Diabetic  Activity: As tolerated  DVT prophylaxis: Xarelto  Isolation precautions: None       Signed by: Elen Liz MD    February 19, 2024 at 7:30 PM

## 2024-02-20 NOTE — WOUND CARE
Wound Consult:  new consult Visit. Chart reviewed.  Consulted for right lateral leg .  Spoke with patients nurse,  Gloria AMADOR.  Patient is resting on a ER stretcher , heels elevated on pillows  Patient is awake, alert, cooperative, stated wife accidentally cut his leg and within 24 hours had an infection.  Assessment:  POA Right lateral leg-2x1x0.1cm- beefy red, moist, small amount of serosanguinous  drainage, painful, no odor,surrounding red, blanches, swollen.    POA Right plantar foot- recent podiatry visit to remove /heal blister, area dry brown callus, no surrounding redness,  POA Right great toe- dry brown scab, slight surrounding redness    Bilateral heels- no redness  Treatment:  Right leg- cleansed with NSS, Ioplex to wound bed, 4x4 and ace wrap .  Wound Recommendations:  Right leg- cleansed with NSS, ioplex to wound bed, 4x4 and ace wrap. Change daily  Skin Care / PI Prevention Recommendations:  1. Minimize friction/shear: minimize layers of linen/pads under patient.  2. Off load pressure/reposition:  turn and reposition approximately every 2 hours; float heels with pillows or use off loading heel boots; waffle cushion for sitting; position wedge.  3. Manage Moisture - keep skin folds dry; incontinence skin care with incontinence wipes.  4. Continue to monitor nutrition, pain, and skin risk scale, and skin assessment.  Plan:  Spoke with Dr. Haque and ortho Gentry CAAL  We will continue to reassess as needed   Azul Peter RN  [facility], Wound / Ostomy Department  Wound Healing Office [phone]

## 2024-02-21 ENCOUNTER — ANESTHESIA EVENT (OUTPATIENT)
Facility: HOSPITAL | Age: 79
DRG: 478 | End: 2024-02-21
Payer: MEDICARE

## 2024-02-21 LAB
BACTERIA SPEC CULT: NORMAL
CREAT SERPL-MCNC: 0.8 MG/DL (ref 0.7–1.3)
EKG ATRIAL RATE: 74 BPM
EKG DIAGNOSIS: NORMAL
EKG P AXIS: 40 DEGREES
EKG P-R INTERVAL: 178 MS
EKG Q-T INTERVAL: 390 MS
EKG QRS DURATION: 76 MS
EKG QTC CALCULATION (BAZETT): 432 MS
EKG R AXIS: 38 DEGREES
EKG T AXIS: 37 DEGREES
EKG VENTRICULAR RATE: 74 BPM
GLUCOSE BLD STRIP.AUTO-MCNC: 100 MG/DL (ref 65–117)
GLUCOSE BLD STRIP.AUTO-MCNC: 133 MG/DL (ref 65–117)
GRAM STN SPEC: NORMAL
GRAM STN SPEC: NORMAL
SERVICE CMNT-IMP: ABNORMAL
SERVICE CMNT-IMP: NORMAL
SERVICE CMNT-IMP: NORMAL
VANCOMYCIN SERPL-MCNC: 16.9 UG/ML

## 2024-02-21 PROCEDURE — 99232 SBSQ HOSP IP/OBS MODERATE 35: CPT | Performed by: INTERNAL MEDICINE

## 2024-02-21 PROCEDURE — 6370000000 HC RX 637 (ALT 250 FOR IP): Performed by: FAMILY MEDICINE

## 2024-02-21 PROCEDURE — 6360000002 HC RX W HCPCS: Performed by: FAMILY MEDICINE

## 2024-02-21 PROCEDURE — 94761 N-INVAS EAR/PLS OXIMETRY MLT: CPT

## 2024-02-21 PROCEDURE — 1100000000 HC RM PRIVATE

## 2024-02-21 PROCEDURE — 36415 COLL VENOUS BLD VENIPUNCTURE: CPT

## 2024-02-21 PROCEDURE — 82565 ASSAY OF CREATININE: CPT

## 2024-02-21 PROCEDURE — 6370000000 HC RX 637 (ALT 250 FOR IP): Performed by: INTERNAL MEDICINE

## 2024-02-21 PROCEDURE — 93010 ELECTROCARDIOGRAM REPORT: CPT | Performed by: SPECIALIST

## 2024-02-21 PROCEDURE — 6360000002 HC RX W HCPCS: Performed by: INTERNAL MEDICINE

## 2024-02-21 PROCEDURE — 2580000003 HC RX 258: Performed by: FAMILY MEDICINE

## 2024-02-21 PROCEDURE — 6370000000 HC RX 637 (ALT 250 FOR IP): Performed by: NURSE PRACTITIONER

## 2024-02-21 PROCEDURE — 82962 GLUCOSE BLOOD TEST: CPT

## 2024-02-21 PROCEDURE — 80202 ASSAY OF VANCOMYCIN: CPT

## 2024-02-21 RX ORDER — AMLODIPINE BESYLATE 5 MG/1
5 TABLET ORAL DAILY
Status: DISCONTINUED | OUTPATIENT
Start: 2024-02-21 | End: 2024-02-27

## 2024-02-21 RX ORDER — ENOXAPARIN SODIUM 100 MG/ML
1 INJECTION SUBCUTANEOUS 2 TIMES DAILY
Status: DISCONTINUED | OUTPATIENT
Start: 2024-02-21 | End: 2024-02-28

## 2024-02-21 RX ADMIN — ENOXAPARIN SODIUM 90 MG: 100 INJECTION SUBCUTANEOUS at 20:21

## 2024-02-21 RX ADMIN — SODIUM CHLORIDE, PRESERVATIVE FREE 10 ML: 5 INJECTION INTRAVENOUS at 09:06

## 2024-02-21 RX ADMIN — ALLOPURINOL 100 MG: 100 TABLET ORAL at 09:02

## 2024-02-21 RX ADMIN — ASPIRIN 81 MG: 81 TABLET, COATED ORAL at 09:02

## 2024-02-21 RX ADMIN — Medication 6 MG: at 21:43

## 2024-02-21 RX ADMIN — METRONIDAZOLE 500 MG: 500 INJECTION, SOLUTION INTRAVENOUS at 12:42

## 2024-02-21 RX ADMIN — GABAPENTIN 1200 MG: 300 CAPSULE ORAL at 09:02

## 2024-02-21 RX ADMIN — VANCOMYCIN HYDROCHLORIDE 1000 MG: 1 INJECTION, POWDER, LYOPHILIZED, FOR SOLUTION INTRAVENOUS at 20:22

## 2024-02-21 RX ADMIN — CEFEPIME 2000 MG: 2 INJECTION, POWDER, FOR SOLUTION INTRAVENOUS at 05:36

## 2024-02-21 RX ADMIN — ACETAMINOPHEN 650 MG: 325 TABLET ORAL at 00:40

## 2024-02-21 RX ADMIN — METRONIDAZOLE 500 MG: 500 INJECTION, SOLUTION INTRAVENOUS at 20:32

## 2024-02-21 RX ADMIN — CEFEPIME 2000 MG: 2 INJECTION, POWDER, FOR SOLUTION INTRAVENOUS at 21:43

## 2024-02-21 RX ADMIN — METRONIDAZOLE 500 MG: 500 INJECTION, SOLUTION INTRAVENOUS at 04:27

## 2024-02-21 RX ADMIN — SODIUM CHLORIDE, PRESERVATIVE FREE 10 ML: 5 INJECTION INTRAVENOUS at 20:20

## 2024-02-21 RX ADMIN — AMLODIPINE BESYLATE 5 MG: 5 TABLET ORAL at 12:39

## 2024-02-21 RX ADMIN — METOPROLOL SUCCINATE 50 MG: 50 TABLET, EXTENDED RELEASE ORAL at 09:02

## 2024-02-21 RX ADMIN — GABAPENTIN 1200 MG: 300 CAPSULE ORAL at 20:19

## 2024-02-21 RX ADMIN — CEFEPIME 2000 MG: 2 INJECTION, POWDER, FOR SOLUTION INTRAVENOUS at 14:45

## 2024-02-21 RX ADMIN — VANCOMYCIN HYDROCHLORIDE 1000 MG: 1 INJECTION, POWDER, LYOPHILIZED, FOR SOLUTION INTRAVENOUS at 09:09

## 2024-02-21 RX ADMIN — ROSUVASTATIN CALCIUM 10 MG: 10 TABLET, COATED ORAL at 20:19

## 2024-02-21 ASSESSMENT — PAIN SCALES - GENERAL: PAINLEVEL_OUTOF10: 6

## 2024-02-21 ASSESSMENT — PAIN DESCRIPTION - ORIENTATION: ORIENTATION: ANTERIOR

## 2024-02-21 ASSESSMENT — PAIN DESCRIPTION - LOCATION: LOCATION: HEAD

## 2024-02-21 ASSESSMENT — PAIN DESCRIPTION - DESCRIPTORS: DESCRIPTORS: ACHING

## 2024-02-22 ENCOUNTER — ANESTHESIA (OUTPATIENT)
Facility: HOSPITAL | Age: 79
DRG: 478 | End: 2024-02-22
Payer: MEDICARE

## 2024-02-22 ENCOUNTER — APPOINTMENT (OUTPATIENT)
Facility: HOSPITAL | Age: 79
DRG: 478 | End: 2024-02-22
Payer: MEDICARE

## 2024-02-22 LAB
ALBUMIN SERPL-MCNC: 2.4 G/DL (ref 3.5–5)
ANION GAP SERPL CALC-SCNC: 5 MMOL/L (ref 5–15)
BASOPHILS # BLD: 0.1 K/UL (ref 0–0.1)
BASOPHILS NFR BLD: 1 % (ref 0–1)
BUN SERPL-MCNC: 13 MG/DL (ref 6–20)
BUN/CREAT SERPL: 18 (ref 12–20)
CALCIUM SERPL-MCNC: 9 MG/DL (ref 8.5–10.1)
CHLORIDE SERPL-SCNC: 107 MMOL/L (ref 97–108)
CO2 SERPL-SCNC: 30 MMOL/L (ref 21–32)
CREAT SERPL-MCNC: 0.73 MG/DL (ref 0.7–1.3)
CRP SERPL-MCNC: 1.81 MG/DL (ref 0–0.3)
DIFFERENTIAL METHOD BLD: NORMAL
EOSINOPHIL # BLD: 0.2 K/UL (ref 0–0.4)
EOSINOPHIL NFR BLD: 2 % (ref 0–7)
ERYTHROCYTE [DISTWIDTH] IN BLOOD BY AUTOMATED COUNT: 13.2 % (ref 11.5–14.5)
GLUCOSE BLD STRIP.AUTO-MCNC: 112 MG/DL (ref 65–117)
GLUCOSE BLD STRIP.AUTO-MCNC: 278 MG/DL (ref 65–117)
GLUCOSE SERPL-MCNC: 108 MG/DL (ref 65–100)
HCT VFR BLD AUTO: 38.1 % (ref 36.6–50.3)
HGB BLD-MCNC: 12.2 G/DL (ref 12.1–17)
IMM GRANULOCYTES # BLD AUTO: 0 K/UL (ref 0–0.04)
IMM GRANULOCYTES NFR BLD AUTO: 0 % (ref 0–0.5)
LYMPHOCYTES # BLD: 1.8 K/UL (ref 0.8–3.5)
LYMPHOCYTES NFR BLD: 19 % (ref 12–49)
MAGNESIUM SERPL-MCNC: 1.8 MG/DL (ref 1.6–2.4)
MCH RBC QN AUTO: 29 PG (ref 26–34)
MCHC RBC AUTO-ENTMCNC: 32 G/DL (ref 30–36.5)
MCV RBC AUTO: 90.5 FL (ref 80–99)
MONOCYTES # BLD: 0.9 K/UL (ref 0–1)
MONOCYTES NFR BLD: 9 % (ref 5–13)
NEUTS SEG # BLD: 6.4 K/UL (ref 1.8–8)
NEUTS SEG NFR BLD: 69 % (ref 32–75)
NRBC # BLD: 0 K/UL (ref 0–0.01)
NRBC BLD-RTO: 0 PER 100 WBC
PHOSPHATE SERPL-MCNC: 3 MG/DL (ref 2.6–4.7)
PLATELET # BLD AUTO: 321 K/UL (ref 150–400)
PMV BLD AUTO: 10.6 FL (ref 8.9–12.9)
POTASSIUM SERPL-SCNC: 3.7 MMOL/L (ref 3.5–5.1)
RBC # BLD AUTO: 4.21 M/UL (ref 4.1–5.7)
SERVICE CMNT-IMP: ABNORMAL
SERVICE CMNT-IMP: NORMAL
SODIUM SERPL-SCNC: 142 MMOL/L (ref 136–145)
WBC # BLD AUTO: 9.4 K/UL (ref 4.1–11.1)

## 2024-02-22 PROCEDURE — 6360000002 HC RX W HCPCS: Performed by: INTERNAL MEDICINE

## 2024-02-22 PROCEDURE — 0QPN04Z REMOVAL OF INTERNAL FIXATION DEVICE FROM RIGHT METATARSAL, OPEN APPROACH: ICD-10-PCS | Performed by: ORTHOPAEDIC SURGERY

## 2024-02-22 PROCEDURE — 3600000014 HC SURGERY LEVEL 4 ADDTL 15MIN: Performed by: ORTHOPAEDIC SURGERY

## 2024-02-22 PROCEDURE — 80069 RENAL FUNCTION PANEL: CPT

## 2024-02-22 PROCEDURE — 0QBJ0ZX EXCISION OF RIGHT FIBULA, OPEN APPROACH, DIAGNOSTIC: ICD-10-PCS | Performed by: ORTHOPAEDIC SURGERY

## 2024-02-22 PROCEDURE — 2580000003 HC RX 258: Performed by: FAMILY MEDICINE

## 2024-02-22 PROCEDURE — 3700000000 HC ANESTHESIA ATTENDED CARE: Performed by: ORTHOPAEDIC SURGERY

## 2024-02-22 PROCEDURE — 86140 C-REACTIVE PROTEIN: CPT

## 2024-02-22 PROCEDURE — 6360000002 HC RX W HCPCS

## 2024-02-22 PROCEDURE — 0QPH04Z REMOVAL OF INTERNAL FIXATION DEVICE FROM LEFT TIBIA, OPEN APPROACH: ICD-10-PCS | Performed by: ORTHOPAEDIC SURGERY

## 2024-02-22 PROCEDURE — 6370000000 HC RX 637 (ALT 250 FOR IP): Performed by: INTERNAL MEDICINE

## 2024-02-22 PROCEDURE — 6360000002 HC RX W HCPCS: Performed by: FAMILY MEDICINE

## 2024-02-22 PROCEDURE — 82962 GLUCOSE BLOOD TEST: CPT

## 2024-02-22 PROCEDURE — 85025 COMPLETE CBC W/AUTO DIFF WBC: CPT

## 2024-02-22 PROCEDURE — 7100000000 HC PACU RECOVERY - FIRST 15 MIN: Performed by: ORTHOPAEDIC SURGERY

## 2024-02-22 PROCEDURE — 2500000003 HC RX 250 WO HCPCS

## 2024-02-22 PROCEDURE — 2580000003 HC RX 258

## 2024-02-22 PROCEDURE — 6360000002 HC RX W HCPCS: Performed by: ORTHOPAEDIC SURGERY

## 2024-02-22 PROCEDURE — 2709999900 HC NON-CHARGEABLE SUPPLY: Performed by: ORTHOPAEDIC SURGERY

## 2024-02-22 PROCEDURE — 36415 COLL VENOUS BLD VENIPUNCTURE: CPT

## 2024-02-22 PROCEDURE — 87205 SMEAR GRAM STAIN: CPT

## 2024-02-22 PROCEDURE — 6370000000 HC RX 637 (ALT 250 FOR IP): Performed by: FAMILY MEDICINE

## 2024-02-22 PROCEDURE — 94761 N-INVAS EAR/PLS OXIMETRY MLT: CPT

## 2024-02-22 PROCEDURE — 0S9H0ZZ DRAINAGE OF RIGHT TARSAL JOINT, OPEN APPROACH: ICD-10-PCS | Performed by: ORTHOPAEDIC SURGERY

## 2024-02-22 PROCEDURE — 2580000003 HC RX 258: Performed by: ANESTHESIOLOGY

## 2024-02-22 PROCEDURE — 87070 CULTURE OTHR SPECIMN AEROBIC: CPT

## 2024-02-22 PROCEDURE — 88307 TISSUE EXAM BY PATHOLOGIST: CPT

## 2024-02-22 PROCEDURE — 2500000003 HC RX 250 WO HCPCS: Performed by: ORTHOPAEDIC SURGERY

## 2024-02-22 PROCEDURE — 83735 ASSAY OF MAGNESIUM: CPT

## 2024-02-22 PROCEDURE — 0QBJ0ZZ EXCISION OF RIGHT FIBULA, OPEN APPROACH: ICD-10-PCS | Performed by: ORTHOPAEDIC SURGERY

## 2024-02-22 PROCEDURE — 3600000004 HC SURGERY LEVEL 4 BASE: Performed by: ORTHOPAEDIC SURGERY

## 2024-02-22 PROCEDURE — 88311 DECALCIFY TISSUE: CPT

## 2024-02-22 PROCEDURE — 3700000001 HC ADD 15 MINUTES (ANESTHESIA): Performed by: ORTHOPAEDIC SURGERY

## 2024-02-22 PROCEDURE — 1100000000 HC RM PRIVATE

## 2024-02-22 PROCEDURE — 7100000001 HC PACU RECOVERY - ADDTL 15 MIN: Performed by: ORTHOPAEDIC SURGERY

## 2024-02-22 RX ORDER — MORPHINE SULFATE 2 MG/ML
2 INJECTION, SOLUTION INTRAMUSCULAR; INTRAVENOUS
Status: CANCELLED | OUTPATIENT
Start: 2024-02-22

## 2024-02-22 RX ORDER — SODIUM CHLORIDE 0.9 % (FLUSH) 0.9 %
5-40 SYRINGE (ML) INJECTION PRN
Status: CANCELLED | OUTPATIENT
Start: 2024-02-22

## 2024-02-22 RX ORDER — OXYCODONE HYDROCHLORIDE 5 MG/1
5 TABLET ORAL EVERY 4 HOURS PRN
Status: CANCELLED | OUTPATIENT
Start: 2024-02-22

## 2024-02-22 RX ORDER — ONDANSETRON 4 MG/1
4 TABLET, ORALLY DISINTEGRATING ORAL EVERY 8 HOURS PRN
Status: CANCELLED | OUTPATIENT
Start: 2024-02-22

## 2024-02-22 RX ORDER — PROPOFOL 10 MG/ML
INJECTION, EMULSION INTRAVENOUS PRN
Status: DISCONTINUED | OUTPATIENT
Start: 2024-02-22 | End: 2024-02-22 | Stop reason: SDUPTHER

## 2024-02-22 RX ORDER — EPHEDRINE SULFATE/0.9% NACL/PF 50 MG/5 ML
SYRINGE (ML) INTRAVENOUS PRN
Status: DISCONTINUED | OUTPATIENT
Start: 2024-02-22 | End: 2024-02-22 | Stop reason: SDUPTHER

## 2024-02-22 RX ORDER — DEXAMETHASONE SODIUM PHOSPHATE 4 MG/ML
INJECTION, SOLUTION INTRA-ARTICULAR; INTRALESIONAL; INTRAMUSCULAR; INTRAVENOUS; SOFT TISSUE PRN
Status: DISCONTINUED | OUTPATIENT
Start: 2024-02-22 | End: 2024-02-22 | Stop reason: SDUPTHER

## 2024-02-22 RX ORDER — ONDANSETRON 2 MG/ML
4 INJECTION INTRAMUSCULAR; INTRAVENOUS
Status: DISCONTINUED | OUTPATIENT
Start: 2024-02-22 | End: 2024-02-22 | Stop reason: HOSPADM

## 2024-02-22 RX ORDER — FENTANYL CITRATE 50 UG/ML
100 INJECTION, SOLUTION INTRAMUSCULAR; INTRAVENOUS
Status: DISCONTINUED | OUTPATIENT
Start: 2024-02-22 | End: 2024-02-22 | Stop reason: HOSPADM

## 2024-02-22 RX ORDER — SODIUM CHLORIDE, SODIUM LACTATE, POTASSIUM CHLORIDE, CALCIUM CHLORIDE 600; 310; 30; 20 MG/100ML; MG/100ML; MG/100ML; MG/100ML
INJECTION, SOLUTION INTRAVENOUS CONTINUOUS
Status: DISCONTINUED | OUTPATIENT
Start: 2024-02-22 | End: 2024-02-27

## 2024-02-22 RX ORDER — MIDAZOLAM HYDROCHLORIDE 2 MG/2ML
2 INJECTION, SOLUTION INTRAMUSCULAR; INTRAVENOUS
Status: DISCONTINUED | OUTPATIENT
Start: 2024-02-22 | End: 2024-02-22 | Stop reason: HOSPADM

## 2024-02-22 RX ORDER — BUPIVACAINE HYDROCHLORIDE 5 MG/ML
INJECTION, SOLUTION EPIDURAL; INTRACAUDAL PRN
Status: DISCONTINUED | OUTPATIENT
Start: 2024-02-22 | End: 2024-02-22 | Stop reason: ALTCHOICE

## 2024-02-22 RX ORDER — LIDOCAINE HYDROCHLORIDE 20 MG/ML
INJECTION, SOLUTION EPIDURAL; INFILTRATION; INTRACAUDAL; PERINEURAL PRN
Status: DISCONTINUED | OUTPATIENT
Start: 2024-02-22 | End: 2024-02-22 | Stop reason: SDUPTHER

## 2024-02-22 RX ORDER — DIPHENHYDRAMINE HYDROCHLORIDE 50 MG/ML
12.5 INJECTION INTRAMUSCULAR; INTRAVENOUS
Status: DISCONTINUED | OUTPATIENT
Start: 2024-02-22 | End: 2024-02-22 | Stop reason: HOSPADM

## 2024-02-22 RX ORDER — ONDANSETRON 2 MG/ML
4 INJECTION INTRAMUSCULAR; INTRAVENOUS EVERY 6 HOURS PRN
Status: CANCELLED | OUTPATIENT
Start: 2024-02-22

## 2024-02-22 RX ORDER — FENTANYL CITRATE 50 UG/ML
INJECTION, SOLUTION INTRAMUSCULAR; INTRAVENOUS PRN
Status: DISCONTINUED | OUTPATIENT
Start: 2024-02-22 | End: 2024-02-22 | Stop reason: SDUPTHER

## 2024-02-22 RX ORDER — LIDOCAINE HYDROCHLORIDE 10 MG/ML
INJECTION, SOLUTION EPIDURAL; INFILTRATION; INTRACAUDAL; PERINEURAL PRN
Status: DISCONTINUED | OUTPATIENT
Start: 2024-02-22 | End: 2024-02-22 | Stop reason: ALTCHOICE

## 2024-02-22 RX ORDER — PHENYLEPHRINE HCL IN 0.9% NACL 0.4MG/10ML
SYRINGE (ML) INTRAVENOUS PRN
Status: DISCONTINUED | OUTPATIENT
Start: 2024-02-22 | End: 2024-02-22 | Stop reason: SDUPTHER

## 2024-02-22 RX ORDER — LIDOCAINE HYDROCHLORIDE 10 MG/ML
1 INJECTION, SOLUTION EPIDURAL; INFILTRATION; INTRACAUDAL; PERINEURAL
Status: DISCONTINUED | OUTPATIENT
Start: 2024-02-22 | End: 2024-02-22 | Stop reason: HOSPADM

## 2024-02-22 RX ORDER — SODIUM CHLORIDE 0.9 % (FLUSH) 0.9 %
5-40 SYRINGE (ML) INJECTION EVERY 12 HOURS SCHEDULED
Status: CANCELLED | OUTPATIENT
Start: 2024-02-22

## 2024-02-22 RX ORDER — SODIUM CHLORIDE, SODIUM LACTATE, POTASSIUM CHLORIDE, CALCIUM CHLORIDE 600; 310; 30; 20 MG/100ML; MG/100ML; MG/100ML; MG/100ML
INJECTION, SOLUTION INTRAVENOUS CONTINUOUS
Status: DISCONTINUED | OUTPATIENT
Start: 2024-02-22 | End: 2024-02-22 | Stop reason: HOSPADM

## 2024-02-22 RX ORDER — SODIUM CHLORIDE 9 MG/ML
INJECTION, SOLUTION INTRAVENOUS PRN
Status: CANCELLED | OUTPATIENT
Start: 2024-02-22

## 2024-02-22 RX ORDER — ONDANSETRON 2 MG/ML
INJECTION INTRAMUSCULAR; INTRAVENOUS PRN
Status: DISCONTINUED | OUTPATIENT
Start: 2024-02-22 | End: 2024-02-22 | Stop reason: SDUPTHER

## 2024-02-22 RX ORDER — LACTOBACILLUS RHAMNOSUS GG 10B CELL
1 CAPSULE ORAL
Status: DISCONTINUED | OUTPATIENT
Start: 2024-02-23 | End: 2024-02-28 | Stop reason: HOSPADM

## 2024-02-22 RX ADMIN — VANCOMYCIN HYDROCHLORIDE 1000 MG: 1 INJECTION, POWDER, LYOPHILIZED, FOR SOLUTION INTRAVENOUS at 08:34

## 2024-02-22 RX ADMIN — VANCOMYCIN HYDROCHLORIDE 1000 MG: 1 INJECTION, POWDER, LYOPHILIZED, FOR SOLUTION INTRAVENOUS at 21:12

## 2024-02-22 RX ADMIN — SODIUM CHLORIDE, PRESERVATIVE FREE 10 ML: 5 INJECTION INTRAVENOUS at 08:36

## 2024-02-22 RX ADMIN — FENTANYL CITRATE 50 MCG: 50 INJECTION, SOLUTION INTRAMUSCULAR; INTRAVENOUS at 16:05

## 2024-02-22 RX ADMIN — ENOXAPARIN SODIUM 90 MG: 100 INJECTION SUBCUTANEOUS at 21:14

## 2024-02-22 RX ADMIN — ROSUVASTATIN CALCIUM 10 MG: 10 TABLET, COATED ORAL at 21:14

## 2024-02-22 RX ADMIN — DEXAMETHASONE SODIUM PHOSPHATE 4 MG: 4 INJECTION INTRA-ARTICULAR; INTRALESIONAL; INTRAMUSCULAR; INTRAVENOUS; SOFT TISSUE at 16:05

## 2024-02-22 RX ADMIN — FENTANYL CITRATE 25 MCG: 50 INJECTION, SOLUTION INTRAMUSCULAR; INTRAVENOUS at 15:43

## 2024-02-22 RX ADMIN — GABAPENTIN 1200 MG: 300 CAPSULE ORAL at 08:34

## 2024-02-22 RX ADMIN — SODIUM CHLORIDE, POTASSIUM CHLORIDE, SODIUM LACTATE AND CALCIUM CHLORIDE: 600; 310; 30; 20 INJECTION, SOLUTION INTRAVENOUS at 14:43

## 2024-02-22 RX ADMIN — Medication 120 MCG: at 15:48

## 2024-02-22 RX ADMIN — PROPOFOL 150 MG: 10 INJECTION, EMULSION INTRAVENOUS at 15:44

## 2024-02-22 RX ADMIN — SODIUM CHLORIDE, POTASSIUM CHLORIDE, SODIUM LACTATE AND CALCIUM CHLORIDE: 600; 310; 30; 20 INJECTION, SOLUTION INTRAVENOUS at 18:38

## 2024-02-22 RX ADMIN — METOPROLOL SUCCINATE 50 MG: 50 TABLET, EXTENDED RELEASE ORAL at 08:34

## 2024-02-22 RX ADMIN — METRONIDAZOLE 500 MG: 500 INJECTION, SOLUTION INTRAVENOUS at 11:45

## 2024-02-22 RX ADMIN — SODIUM CHLORIDE, PRESERVATIVE FREE 10 ML: 5 INJECTION INTRAVENOUS at 22:18

## 2024-02-22 RX ADMIN — Medication 20 MG: at 15:52

## 2024-02-22 RX ADMIN — ASPIRIN 81 MG: 81 TABLET, COATED ORAL at 08:34

## 2024-02-22 RX ADMIN — METRONIDAZOLE 500 MG: 500 INJECTION, SOLUTION INTRAVENOUS at 04:41

## 2024-02-22 RX ADMIN — ACETAMINOPHEN 650 MG: 325 TABLET ORAL at 05:06

## 2024-02-22 RX ADMIN — SODIUM CHLORIDE, POTASSIUM CHLORIDE, SODIUM LACTATE AND CALCIUM CHLORIDE: 600; 310; 30; 20 INJECTION, SOLUTION INTRAVENOUS at 22:24

## 2024-02-22 RX ADMIN — METRONIDAZOLE 500 MG: 500 INJECTION, SOLUTION INTRAVENOUS at 21:14

## 2024-02-22 RX ADMIN — AMLODIPINE BESYLATE 5 MG: 5 TABLET ORAL at 08:34

## 2024-02-22 RX ADMIN — Medication 10 MG: at 15:49

## 2024-02-22 RX ADMIN — ACETAMINOPHEN 650 MG: 325 TABLET ORAL at 11:42

## 2024-02-22 RX ADMIN — CEFEPIME 2000 MG: 2 INJECTION, POWDER, FOR SOLUTION INTRAVENOUS at 22:23

## 2024-02-22 RX ADMIN — FENTANYL CITRATE 50 MCG: 50 INJECTION, SOLUTION INTRAMUSCULAR; INTRAVENOUS at 16:34

## 2024-02-22 RX ADMIN — Medication 20 MG: at 15:56

## 2024-02-22 RX ADMIN — CEFEPIME 2000 MG: 2 INJECTION, POWDER, FOR SOLUTION INTRAVENOUS at 06:00

## 2024-02-22 RX ADMIN — CEFEPIME 2000 MG: 2 INJECTION, POWDER, FOR SOLUTION INTRAVENOUS at 13:46

## 2024-02-22 RX ADMIN — ALLOPURINOL 100 MG: 100 TABLET ORAL at 08:34

## 2024-02-22 RX ADMIN — PHENYLEPHRINE HYDROCHLORIDE 40 MCG/MIN: 10 INJECTION INTRAVENOUS at 16:00

## 2024-02-22 RX ADMIN — Medication 40 MCG: at 15:56

## 2024-02-22 RX ADMIN — LIDOCAINE HYDROCHLORIDE 100 MG: 20 INJECTION, SOLUTION EPIDURAL; INFILTRATION; INTRACAUDAL; PERINEURAL at 15:44

## 2024-02-22 RX ADMIN — ONDANSETRON 4 MG: 2 INJECTION INTRAMUSCULAR; INTRAVENOUS at 16:05

## 2024-02-22 RX ADMIN — FENTANYL CITRATE 25 MCG: 50 INJECTION, SOLUTION INTRAMUSCULAR; INTRAVENOUS at 15:30

## 2024-02-22 RX ADMIN — GABAPENTIN 1200 MG: 300 CAPSULE ORAL at 21:14

## 2024-02-22 RX ADMIN — Medication 40 MCG: at 15:52

## 2024-02-22 ASSESSMENT — PAIN SCALES - GENERAL
PAINLEVEL_OUTOF10: 0
PAINLEVEL_OUTOF10: 6
PAINLEVEL_OUTOF10: 0

## 2024-02-22 ASSESSMENT — PAIN DESCRIPTION - LOCATION: LOCATION: HEAD

## 2024-02-22 ASSESSMENT — PAIN DESCRIPTION - ORIENTATION: ORIENTATION: ANTERIOR

## 2024-02-22 ASSESSMENT — PAIN DESCRIPTION - DESCRIPTORS: DESCRIPTORS: ACHING

## 2024-02-22 ASSESSMENT — PAIN - FUNCTIONAL ASSESSMENT: PAIN_FUNCTIONAL_ASSESSMENT: 0-10

## 2024-02-22 NOTE — BRIEF OP NOTE
Brief Postoperative Note      Patient: Kingsley Wilson  YOB: 1945  MRN: [id number]    Date of Procedure: 2/22/2024    Pre-op Diagnosis:  RIGHT ankle abscess  RIGHT ankle septic arthritis  RIGHT subtalar septic arthritis    Post-Op Diagnosis: Same       Procedure:  Irrigation and excisional debridement of RIGHT ankle abscess  Removal of hardware RIGHT lateral ankle  Removal of RIGHT medial ankle hardware through a separate incision  RIGHT ankle open arthrotomy for incision and drainage of septic arthritis  RIGHT subtalar open arthrotomy through a separate incision for incision and drainage of septic arthritis  Bone biopsy RIGHT lateral malleolus    Surgeon(s):  Silverio Martinez MD    Assistant:  Physician Assistant: Jeff Call PA-C    During the procedure Jeff Call PA-C performed the duties of positioning, retraction, assistance with limb and explant management, closure and dressing application      Anesthesia: General w/ local    Estimated Blood Loss (mL): less than 5     Complications: None    Specimens:   ID Type Source Tests Collected by Time Destination   1 : RIGHT TIBIAL BONE BIOPSY Bone Ankle SURGICAL PATHOLOGY Silverio Martinez MD 2/22/2024 1622    A : RIGHT LATERAL ANKLE ABSCESS Tissue Ankle CULTURE, TISSUE Silverio Martinez MD 2/22/2024 1512    B : RIGHT ANKLE JOINT Swab Ankle CULTURE, WOUND Silverio Martinez MD 2/22/2024 1636    C : RIGHT SUBTAYLOR JOINT Swab Ankle CULTURE, WOUND Silverio Martinez MD 2/22/2024 1638      Deep culture RIGHT ankle abscess  RIGHT ankle joint culture  RIGHT subtalar joint culture  Bone biopsy RIGHT lateral malleolus      Implants:  * No implants in log *  none      Drains: * No LDAs found *      Findings:  Lateral malleolus ankle fx was healed.   RIGHT lateral ankle ulcer with underlying abscess over the lateral plate/screws.  There was a sinus tract down to the abscess adjacent to the hardware.  Cloudy joint fluid in the ankle and subtalar joints    TT:

## 2024-02-22 NOTE — ANESTHESIA PRE PROCEDURE
ALKPHOS 118 02/13/2024 02:34 PM    AST 18 02/19/2024 05:00 PM    ALT 18 02/19/2024 05:00 PM       POC Tests:   Recent Labs     02/22/24  0644   POCGLU 112       Coags:   Lab Results   Component Value Date/Time    PROTIME 12.1 07/18/2023 10:57 AM    INR 1.2 07/18/2023 10:57 AM       HCG (If Applicable): No results found for: \"PREGTESTUR\", \"PREGSERUM\", \"HCG\", \"HCGQUANT\"     ABGs: No results found for: \"PHART\", \"PO2ART\", \"TXN2EEQ\", \"DPX2HAI\", \"BEART\", \"I0UWVNRB\"     Type & Screen (If Applicable):  No results found for: \"LABABO\", \"LABRH\"    Drug/Infectious Status (If Applicable):  Lab Results   Component Value Date/Time    HEPCAB <0.1 02/08/2017 10:24 AM       COVID-19 Screening (If Applicable):   Lab Results   Component Value Date/Time    COVID19 Not detected 07/23/2023 03:21 PM           Anesthesia Evaluation  Patient summary reviewed and Nursing notes reviewed  Airway: Mallampati: II  TM distance: >3 FB   Neck ROM: full  Mouth opening: > = 3 FB   Dental: normal exam   (+) caps      Pulmonary:Negative Pulmonary ROS and normal exam  breath sounds clear to auscultation  (+)  COPD:                                     Cardiovascular:Negative CV ROS  Exercise tolerance: good (>4 METS)  (+) hypertension:, valvular problems/murmurs (S/p Tvr 1/23):, hyperlipidemia        Rhythm: regular  Rate: normal                    Neuro/Psych:   Negative Neuro/Psych ROS               ROS comment: Peripheral neuropathy feet GI/Hepatic/Renal: Neg GI/Hepatic/Renal ROS            Endo/Other: Negative Endo/Other ROS   (+) Diabetes (prediabetic), : arthritis: OA..                  ROS comment: Factor V Leiden mutation Abdominal:             Vascular: negative vascular ROS.  + DVT (right leg, factor V leiden).       Other Findings:         Anesthesia Plan      general     ASA 3       Induction: intravenous.      Anesthetic plan and risks discussed with patient.                      Maile Davison MD   2/22/2024

## 2024-02-23 LAB
ALBUMIN SERPL-MCNC: 2.2 G/DL (ref 3.5–5)
ANION GAP SERPL CALC-SCNC: 3 MMOL/L (ref 5–15)
BACTERIA SPEC CULT: NORMAL
BASOPHILS # BLD: 0.1 K/UL (ref 0–0.1)
BASOPHILS NFR BLD: 1 % (ref 0–1)
BUN SERPL-MCNC: 15 MG/DL (ref 6–20)
BUN/CREAT SERPL: 20 (ref 12–20)
CALCIUM SERPL-MCNC: 8.3 MG/DL (ref 8.5–10.1)
CHLORIDE SERPL-SCNC: 108 MMOL/L (ref 97–108)
CO2 SERPL-SCNC: 30 MMOL/L (ref 21–32)
CREAT SERPL-MCNC: 0.75 MG/DL (ref 0.7–1.3)
CRP SERPL-MCNC: 1.62 MG/DL (ref 0–0.3)
DIFFERENTIAL METHOD BLD: ABNORMAL
EOSINOPHIL # BLD: 0 K/UL (ref 0–0.4)
EOSINOPHIL NFR BLD: 0 % (ref 0–7)
ERYTHROCYTE [DISTWIDTH] IN BLOOD BY AUTOMATED COUNT: 13.1 % (ref 11.5–14.5)
GLUCOSE BLD STRIP.AUTO-MCNC: 109 MG/DL (ref 65–117)
GLUCOSE BLD STRIP.AUTO-MCNC: 121 MG/DL (ref 65–117)
GLUCOSE BLD STRIP.AUTO-MCNC: 151 MG/DL (ref 65–117)
GLUCOSE BLD STRIP.AUTO-MCNC: 151 MG/DL (ref 65–117)
GLUCOSE SERPL-MCNC: 167 MG/DL (ref 65–100)
GRAM STN SPEC: NORMAL
HCT VFR BLD AUTO: 34.4 % (ref 36.6–50.3)
HGB BLD-MCNC: 11 G/DL (ref 12.1–17)
IMM GRANULOCYTES # BLD AUTO: 0 K/UL (ref 0–0.04)
IMM GRANULOCYTES NFR BLD AUTO: 0 % (ref 0–0.5)
LYMPHOCYTES # BLD: 0.9 K/UL (ref 0.8–3.5)
LYMPHOCYTES NFR BLD: 10 % (ref 12–49)
MAGNESIUM SERPL-MCNC: 1.7 MG/DL (ref 1.6–2.4)
MCH RBC QN AUTO: 29.2 PG (ref 26–34)
MCHC RBC AUTO-ENTMCNC: 32 G/DL (ref 30–36.5)
MCV RBC AUTO: 91.2 FL (ref 80–99)
MONOCYTES # BLD: 0.8 K/UL (ref 0–1)
MONOCYTES NFR BLD: 9 % (ref 5–13)
NEUTS SEG # BLD: 7.1 K/UL (ref 1.8–8)
NEUTS SEG NFR BLD: 80 % (ref 32–75)
NRBC # BLD: 0 K/UL (ref 0–0.01)
NRBC BLD-RTO: 0 PER 100 WBC
PHOSPHATE SERPL-MCNC: 2.5 MG/DL (ref 2.6–4.7)
PLATELET # BLD AUTO: 309 K/UL (ref 150–400)
PMV BLD AUTO: 10.7 FL (ref 8.9–12.9)
POTASSIUM SERPL-SCNC: 3.9 MMOL/L (ref 3.5–5.1)
RBC # BLD AUTO: 3.77 M/UL (ref 4.1–5.7)
SERVICE CMNT-IMP: ABNORMAL
SERVICE CMNT-IMP: NORMAL
SODIUM SERPL-SCNC: 141 MMOL/L (ref 136–145)
VANCOMYCIN SERPL-MCNC: 14.3 UG/ML
WBC # BLD AUTO: 8.8 K/UL (ref 4.1–11.1)

## 2024-02-23 PROCEDURE — 6360000002 HC RX W HCPCS: Performed by: INTERNAL MEDICINE

## 2024-02-23 PROCEDURE — 83735 ASSAY OF MAGNESIUM: CPT

## 2024-02-23 PROCEDURE — 6370000000 HC RX 637 (ALT 250 FOR IP): Performed by: FAMILY MEDICINE

## 2024-02-23 PROCEDURE — 94761 N-INVAS EAR/PLS OXIMETRY MLT: CPT

## 2024-02-23 PROCEDURE — 6360000002 HC RX W HCPCS: Performed by: FAMILY MEDICINE

## 2024-02-23 PROCEDURE — 6370000000 HC RX 637 (ALT 250 FOR IP): Performed by: NURSE PRACTITIONER

## 2024-02-23 PROCEDURE — 6370000000 HC RX 637 (ALT 250 FOR IP): Performed by: INTERNAL MEDICINE

## 2024-02-23 PROCEDURE — 2580000003 HC RX 258: Performed by: ANESTHESIOLOGY

## 2024-02-23 PROCEDURE — 82962 GLUCOSE BLOOD TEST: CPT

## 2024-02-23 PROCEDURE — 99024 POSTOP FOLLOW-UP VISIT: CPT | Performed by: NURSE PRACTITIONER

## 2024-02-23 PROCEDURE — 80202 ASSAY OF VANCOMYCIN: CPT

## 2024-02-23 PROCEDURE — 86140 C-REACTIVE PROTEIN: CPT

## 2024-02-23 PROCEDURE — 36415 COLL VENOUS BLD VENIPUNCTURE: CPT

## 2024-02-23 PROCEDURE — 85025 COMPLETE CBC W/AUTO DIFF WBC: CPT

## 2024-02-23 PROCEDURE — 2580000003 HC RX 258: Performed by: FAMILY MEDICINE

## 2024-02-23 PROCEDURE — 2580000003 HC RX 258: Performed by: INTERNAL MEDICINE

## 2024-02-23 PROCEDURE — 80069 RENAL FUNCTION PANEL: CPT

## 2024-02-23 PROCEDURE — 1100000000 HC RM PRIVATE

## 2024-02-23 RX ADMIN — GABAPENTIN 1200 MG: 300 CAPSULE ORAL at 20:40

## 2024-02-23 RX ADMIN — CEFEPIME 2000 MG: 2 INJECTION, POWDER, FOR SOLUTION INTRAVENOUS at 05:35

## 2024-02-23 RX ADMIN — METRONIDAZOLE 500 MG: 500 INJECTION, SOLUTION INTRAVENOUS at 20:47

## 2024-02-23 RX ADMIN — METOPROLOL SUCCINATE 50 MG: 50 TABLET, EXTENDED RELEASE ORAL at 09:40

## 2024-02-23 RX ADMIN — CEFEPIME 2000 MG: 2 INJECTION, POWDER, FOR SOLUTION INTRAVENOUS at 17:12

## 2024-02-23 RX ADMIN — AMLODIPINE BESYLATE 5 MG: 5 TABLET ORAL at 09:39

## 2024-02-23 RX ADMIN — Medication 1 CAPSULE: at 09:40

## 2024-02-23 RX ADMIN — VANCOMYCIN HYDROCHLORIDE 1250 MG: 1.25 INJECTION, POWDER, LYOPHILIZED, FOR SOLUTION INTRAVENOUS at 09:55

## 2024-02-23 RX ADMIN — Medication 6 MG: at 21:57

## 2024-02-23 RX ADMIN — ROSUVASTATIN CALCIUM 10 MG: 10 TABLET, COATED ORAL at 20:41

## 2024-02-23 RX ADMIN — METRONIDAZOLE 500 MG: 500 INJECTION, SOLUTION INTRAVENOUS at 04:00

## 2024-02-23 RX ADMIN — SODIUM CHLORIDE, POTASSIUM CHLORIDE, SODIUM LACTATE AND CALCIUM CHLORIDE: 600; 310; 30; 20 INJECTION, SOLUTION INTRAVENOUS at 17:11

## 2024-02-23 RX ADMIN — CEFEPIME 2000 MG: 2 INJECTION, POWDER, FOR SOLUTION INTRAVENOUS at 09:58

## 2024-02-23 RX ADMIN — SODIUM CHLORIDE 5 ML/HR: 9 INJECTION, SOLUTION INTRAVENOUS at 13:22

## 2024-02-23 RX ADMIN — GABAPENTIN 1200 MG: 300 CAPSULE ORAL at 09:39

## 2024-02-23 RX ADMIN — ASPIRIN 81 MG: 81 TABLET, COATED ORAL at 09:40

## 2024-02-23 RX ADMIN — METRONIDAZOLE 500 MG: 500 INJECTION, SOLUTION INTRAVENOUS at 13:22

## 2024-02-23 RX ADMIN — ALLOPURINOL 100 MG: 100 TABLET ORAL at 09:40

## 2024-02-23 RX ADMIN — ENOXAPARIN SODIUM 90 MG: 100 INJECTION SUBCUTANEOUS at 20:41

## 2024-02-23 RX ADMIN — SODIUM CHLORIDE, PRESERVATIVE FREE 10 ML: 5 INJECTION INTRAVENOUS at 09:41

## 2024-02-23 RX ADMIN — SODIUM CHLORIDE, POTASSIUM CHLORIDE, SODIUM LACTATE AND CALCIUM CHLORIDE: 600; 310; 30; 20 INJECTION, SOLUTION INTRAVENOUS at 05:36

## 2024-02-23 RX ADMIN — ENOXAPARIN SODIUM 90 MG: 100 INJECTION SUBCUTANEOUS at 09:39

## 2024-02-23 RX ADMIN — SODIUM CHLORIDE, PRESERVATIVE FREE 5 ML: 5 INJECTION INTRAVENOUS at 20:44

## 2024-02-23 RX ADMIN — VANCOMYCIN HYDROCHLORIDE 1250 MG: 1.25 INJECTION, POWDER, LYOPHILIZED, FOR SOLUTION INTRAVENOUS at 21:52

## 2024-02-23 ASSESSMENT — PAIN SCALES - GENERAL: PAINLEVEL_OUTOF10: 0

## 2024-02-23 NOTE — OP NOTE
surgery, which include, but not limited to, complications of anesthesia including death, pain, bleeding, infection, damage to surrounding structures, knee fracture, continued ankle pain and stiffness, persistence of infection, progression of his current DVT, and need for further surgery.  The patient verbalized understanding, elected to proceed with surgical intervention.        EILEEN LINDSAY MD      PHW/AQS  D:  02/22/2024 17:09:52  T:  02/22/2024 23:38:12  JOB #:  [id number]

## 2024-02-24 LAB
ABO + RH BLD: NORMAL
ALBUMIN SERPL-MCNC: 2.3 G/DL (ref 3.5–5)
ANION GAP SERPL CALC-SCNC: 3 MMOL/L (ref 5–15)
BASOPHILS # BLD: 0.1 K/UL (ref 0–0.1)
BASOPHILS NFR BLD: 1 % (ref 0–1)
BLD PROD TYP BPU: NORMAL
BLD PROD TYP BPU: NORMAL
BLOOD BANK CMNT PATIENT-IMP: NORMAL
BLOOD BANK DISPENSE STATUS: NORMAL
BLOOD BANK DISPENSE STATUS: NORMAL
BLOOD GROUP ANTIBODIES SERPL: NORMAL
BLOOD GROUP ANTIBODIES SERPL: NORMAL
BPU ID: NORMAL
BPU ID: NORMAL
BUN SERPL-MCNC: 15 MG/DL (ref 6–20)
BUN/CREAT SERPL: 22 (ref 12–20)
CALCIUM SERPL-MCNC: 8.6 MG/DL (ref 8.5–10.1)
CHLORIDE SERPL-SCNC: 109 MMOL/L (ref 97–108)
CO2 SERPL-SCNC: 28 MMOL/L (ref 21–32)
CREAT SERPL-MCNC: 0.67 MG/DL (ref 0.7–1.3)
CROSSMATCH RESULT: NORMAL
CROSSMATCH RESULT: NORMAL
CRP SERPL-MCNC: 1.24 MG/DL (ref 0–0.3)
DIFFERENTIAL METHOD BLD: ABNORMAL
EOSINOPHIL # BLD: 0.3 K/UL (ref 0–0.4)
EOSINOPHIL NFR BLD: 3 % (ref 0–7)
ERYTHROCYTE [DISTWIDTH] IN BLOOD BY AUTOMATED COUNT: 13.3 % (ref 11.5–14.5)
GLUCOSE BLD STRIP.AUTO-MCNC: 118 MG/DL (ref 65–117)
GLUCOSE SERPL-MCNC: 113 MG/DL (ref 65–100)
HCT VFR BLD AUTO: 33.9 % (ref 36.6–50.3)
HGB BLD-MCNC: 10.9 G/DL (ref 12.1–17)
IMM GRANULOCYTES # BLD AUTO: 0 K/UL (ref 0–0.04)
IMM GRANULOCYTES NFR BLD AUTO: 0 % (ref 0–0.5)
LYMPHOCYTES # BLD: 2.2 K/UL (ref 0.8–3.5)
LYMPHOCYTES NFR BLD: 24 % (ref 12–49)
MAGNESIUM SERPL-MCNC: 1.6 MG/DL (ref 1.6–2.4)
MCH RBC QN AUTO: 29.5 PG (ref 26–34)
MCHC RBC AUTO-ENTMCNC: 32.2 G/DL (ref 30–36.5)
MCV RBC AUTO: 91.9 FL (ref 80–99)
MONOCYTES # BLD: 0.9 K/UL (ref 0–1)
MONOCYTES NFR BLD: 10 % (ref 5–13)
NEUTS SEG # BLD: 5.6 K/UL (ref 1.8–8)
NEUTS SEG NFR BLD: 62 % (ref 32–75)
NRBC # BLD: 0 K/UL (ref 0–0.01)
NRBC BLD-RTO: 0 PER 100 WBC
PHOSPHATE SERPL-MCNC: 2.4 MG/DL (ref 2.6–4.7)
PLATELET # BLD AUTO: 295 K/UL (ref 150–400)
PMV BLD AUTO: 10.6 FL (ref 8.9–12.9)
POTASSIUM SERPL-SCNC: 4 MMOL/L (ref 3.5–5.1)
RBC # BLD AUTO: 3.69 M/UL (ref 4.1–5.7)
SERVICE CMNT-IMP: ABNORMAL
SODIUM SERPL-SCNC: 140 MMOL/L (ref 136–145)
SPECIMEN EXP DATE BLD: NORMAL
UNIT DIVISION: 0
UNIT DIVISION: 0
WBC # BLD AUTO: 9.1 K/UL (ref 4.1–11.1)

## 2024-02-24 PROCEDURE — 6370000000 HC RX 637 (ALT 250 FOR IP): Performed by: NURSE PRACTITIONER

## 2024-02-24 PROCEDURE — 80069 RENAL FUNCTION PANEL: CPT

## 2024-02-24 PROCEDURE — 6370000000 HC RX 637 (ALT 250 FOR IP): Performed by: INTERNAL MEDICINE

## 2024-02-24 PROCEDURE — 97530 THERAPEUTIC ACTIVITIES: CPT

## 2024-02-24 PROCEDURE — 2580000003 HC RX 258: Performed by: FAMILY MEDICINE

## 2024-02-24 PROCEDURE — 36415 COLL VENOUS BLD VENIPUNCTURE: CPT

## 2024-02-24 PROCEDURE — 6360000002 HC RX W HCPCS: Performed by: INTERNAL MEDICINE

## 2024-02-24 PROCEDURE — 6360000002 HC RX W HCPCS: Performed by: FAMILY MEDICINE

## 2024-02-24 PROCEDURE — 6370000000 HC RX 637 (ALT 250 FOR IP): Performed by: FAMILY MEDICINE

## 2024-02-24 PROCEDURE — APPNB30 APP NON BILLABLE TIME 0-30 MINS: Performed by: NURSE PRACTITIONER

## 2024-02-24 PROCEDURE — 82962 GLUCOSE BLOOD TEST: CPT

## 2024-02-24 PROCEDURE — 86140 C-REACTIVE PROTEIN: CPT

## 2024-02-24 PROCEDURE — 2580000003 HC RX 258: Performed by: INTERNAL MEDICINE

## 2024-02-24 PROCEDURE — 94761 N-INVAS EAR/PLS OXIMETRY MLT: CPT

## 2024-02-24 PROCEDURE — 1100000000 HC RM PRIVATE

## 2024-02-24 PROCEDURE — 97162 PT EVAL MOD COMPLEX 30 MIN: CPT

## 2024-02-24 PROCEDURE — 83735 ASSAY OF MAGNESIUM: CPT

## 2024-02-24 PROCEDURE — 85025 COMPLETE CBC W/AUTO DIFF WBC: CPT

## 2024-02-24 RX ADMIN — CEFEPIME 2000 MG: 2 INJECTION, POWDER, FOR SOLUTION INTRAVENOUS at 17:33

## 2024-02-24 RX ADMIN — METRONIDAZOLE 500 MG: 500 INJECTION, SOLUTION INTRAVENOUS at 20:15

## 2024-02-24 RX ADMIN — METOPROLOL SUCCINATE 50 MG: 50 TABLET, EXTENDED RELEASE ORAL at 09:15

## 2024-02-24 RX ADMIN — VANCOMYCIN HYDROCHLORIDE 1250 MG: 1.25 INJECTION, POWDER, LYOPHILIZED, FOR SOLUTION INTRAVENOUS at 09:18

## 2024-02-24 RX ADMIN — ENOXAPARIN SODIUM 90 MG: 100 INJECTION SUBCUTANEOUS at 09:15

## 2024-02-24 RX ADMIN — ENOXAPARIN SODIUM 90 MG: 100 INJECTION SUBCUTANEOUS at 20:12

## 2024-02-24 RX ADMIN — Medication 1 CAPSULE: at 09:15

## 2024-02-24 RX ADMIN — ALLOPURINOL 100 MG: 100 TABLET ORAL at 09:15

## 2024-02-24 RX ADMIN — METRONIDAZOLE 500 MG: 500 INJECTION, SOLUTION INTRAVENOUS at 12:50

## 2024-02-24 RX ADMIN — VANCOMYCIN HYDROCHLORIDE 1250 MG: 1.25 INJECTION, POWDER, LYOPHILIZED, FOR SOLUTION INTRAVENOUS at 21:36

## 2024-02-24 RX ADMIN — ROSUVASTATIN CALCIUM 10 MG: 10 TABLET, COATED ORAL at 20:12

## 2024-02-24 RX ADMIN — AMLODIPINE BESYLATE 5 MG: 5 TABLET ORAL at 09:15

## 2024-02-24 RX ADMIN — GABAPENTIN 1200 MG: 300 CAPSULE ORAL at 09:15

## 2024-02-24 RX ADMIN — METRONIDAZOLE 500 MG: 500 INJECTION, SOLUTION INTRAVENOUS at 04:10

## 2024-02-24 RX ADMIN — SODIUM CHLORIDE, PRESERVATIVE FREE 10 ML: 5 INJECTION INTRAVENOUS at 09:19

## 2024-02-24 RX ADMIN — CEFEPIME 2000 MG: 2 INJECTION, POWDER, FOR SOLUTION INTRAVENOUS at 00:31

## 2024-02-24 RX ADMIN — Medication 6 MG: at 21:37

## 2024-02-24 RX ADMIN — GABAPENTIN 1200 MG: 300 CAPSULE ORAL at 20:11

## 2024-02-24 RX ADMIN — ASPIRIN 81 MG: 81 TABLET, COATED ORAL at 09:15

## 2024-02-24 RX ADMIN — CEFEPIME 2000 MG: 2 INJECTION, POWDER, FOR SOLUTION INTRAVENOUS at 09:19

## 2024-02-24 ASSESSMENT — PAIN SCALES - GENERAL
PAINLEVEL_OUTOF10: 0

## 2024-02-25 LAB
ALBUMIN SERPL-MCNC: 2.5 G/DL (ref 3.5–5)
ANION GAP SERPL CALC-SCNC: 3 MMOL/L (ref 5–15)
BACTERIA SPEC CULT: NORMAL
BACTERIA SPEC CULT: NORMAL
BASOPHILS # BLD: 0.1 K/UL (ref 0–0.1)
BASOPHILS NFR BLD: 1 % (ref 0–1)
BUN SERPL-MCNC: 11 MG/DL (ref 6–20)
BUN/CREAT SERPL: 17 (ref 12–20)
CALCIUM SERPL-MCNC: 8.6 MG/DL (ref 8.5–10.1)
CHLORIDE SERPL-SCNC: 105 MMOL/L (ref 97–108)
CO2 SERPL-SCNC: 30 MMOL/L (ref 21–32)
CREAT SERPL-MCNC: 0.65 MG/DL (ref 0.7–1.3)
DIFFERENTIAL METHOD BLD: ABNORMAL
EOSINOPHIL # BLD: 0.4 K/UL (ref 0–0.4)
EOSINOPHIL NFR BLD: 4 % (ref 0–7)
ERYTHROCYTE [DISTWIDTH] IN BLOOD BY AUTOMATED COUNT: 13.2 % (ref 11.5–14.5)
GLUCOSE SERPL-MCNC: 121 MG/DL (ref 65–100)
HCT VFR BLD AUTO: 36 % (ref 36.6–50.3)
HGB BLD-MCNC: 11.5 G/DL (ref 12.1–17)
IMM GRANULOCYTES # BLD AUTO: 0 K/UL (ref 0–0.04)
IMM GRANULOCYTES NFR BLD AUTO: 0 % (ref 0–0.5)
LYMPHOCYTES # BLD: 1.9 K/UL (ref 0.8–3.5)
LYMPHOCYTES NFR BLD: 20 % (ref 12–49)
MAGNESIUM SERPL-MCNC: 1.6 MG/DL (ref 1.6–2.4)
MCH RBC QN AUTO: 28.8 PG (ref 26–34)
MCHC RBC AUTO-ENTMCNC: 31.9 G/DL (ref 30–36.5)
MCV RBC AUTO: 90.2 FL (ref 80–99)
MONOCYTES # BLD: 1 K/UL (ref 0–1)
MONOCYTES NFR BLD: 11 % (ref 5–13)
NEUTS SEG # BLD: 6.1 K/UL (ref 1.8–8)
NEUTS SEG NFR BLD: 64 % (ref 32–75)
NRBC # BLD: 0 K/UL (ref 0–0.01)
NRBC BLD-RTO: 0 PER 100 WBC
PHOSPHATE SERPL-MCNC: 2.6 MG/DL (ref 2.6–4.7)
PLATELET # BLD AUTO: 282 K/UL (ref 150–400)
PMV BLD AUTO: 11.1 FL (ref 8.9–12.9)
POTASSIUM SERPL-SCNC: 3.6 MMOL/L (ref 3.5–5.1)
RBC # BLD AUTO: 3.99 M/UL (ref 4.1–5.7)
SERVICE CMNT-IMP: NORMAL
SERVICE CMNT-IMP: NORMAL
SODIUM SERPL-SCNC: 138 MMOL/L (ref 136–145)
WBC # BLD AUTO: 9.5 K/UL (ref 4.1–11.1)

## 2024-02-25 PROCEDURE — 6370000000 HC RX 637 (ALT 250 FOR IP): Performed by: INTERNAL MEDICINE

## 2024-02-25 PROCEDURE — 83735 ASSAY OF MAGNESIUM: CPT

## 2024-02-25 PROCEDURE — 2580000003 HC RX 258: Performed by: FAMILY MEDICINE

## 2024-02-25 PROCEDURE — 6360000002 HC RX W HCPCS: Performed by: INTERNAL MEDICINE

## 2024-02-25 PROCEDURE — 97530 THERAPEUTIC ACTIVITIES: CPT

## 2024-02-25 PROCEDURE — 2500000003 HC RX 250 WO HCPCS: Performed by: FAMILY MEDICINE

## 2024-02-25 PROCEDURE — 97165 OT EVAL LOW COMPLEX 30 MIN: CPT

## 2024-02-25 PROCEDURE — 6360000002 HC RX W HCPCS: Performed by: FAMILY MEDICINE

## 2024-02-25 PROCEDURE — 36415 COLL VENOUS BLD VENIPUNCTURE: CPT

## 2024-02-25 PROCEDURE — 2580000003 HC RX 258: Performed by: INTERNAL MEDICINE

## 2024-02-25 PROCEDURE — 94761 N-INVAS EAR/PLS OXIMETRY MLT: CPT

## 2024-02-25 PROCEDURE — 1100000000 HC RM PRIVATE

## 2024-02-25 PROCEDURE — APPNB30 APP NON BILLABLE TIME 0-30 MINS: Performed by: NURSE PRACTITIONER

## 2024-02-25 PROCEDURE — 6370000000 HC RX 637 (ALT 250 FOR IP): Performed by: FAMILY MEDICINE

## 2024-02-25 PROCEDURE — 97535 SELF CARE MNGMENT TRAINING: CPT

## 2024-02-25 PROCEDURE — 85025 COMPLETE CBC W/AUTO DIFF WBC: CPT

## 2024-02-25 PROCEDURE — 6360000002 HC RX W HCPCS

## 2024-02-25 PROCEDURE — 80069 RENAL FUNCTION PANEL: CPT

## 2024-02-25 PROCEDURE — 6370000000 HC RX 637 (ALT 250 FOR IP): Performed by: NURSE PRACTITIONER

## 2024-02-25 RX ORDER — HYDRALAZINE HYDROCHLORIDE 20 MG/ML
10 INJECTION INTRAMUSCULAR; INTRAVENOUS ONCE
Status: COMPLETED | OUTPATIENT
Start: 2024-02-25 | End: 2024-02-25

## 2024-02-25 RX ADMIN — ENOXAPARIN SODIUM 90 MG: 100 INJECTION SUBCUTANEOUS at 08:12

## 2024-02-25 RX ADMIN — HYDRALAZINE HYDROCHLORIDE 10 MG: 20 INJECTION, SOLUTION INTRAMUSCULAR; INTRAVENOUS at 04:47

## 2024-02-25 RX ADMIN — ACETAMINOPHEN 650 MG: 325 TABLET ORAL at 17:12

## 2024-02-25 RX ADMIN — ENOXAPARIN SODIUM 90 MG: 100 INJECTION SUBCUTANEOUS at 20:18

## 2024-02-25 RX ADMIN — ROSUVASTATIN CALCIUM 10 MG: 10 TABLET, COATED ORAL at 20:18

## 2024-02-25 RX ADMIN — VANCOMYCIN HYDROCHLORIDE 1250 MG: 1.25 INJECTION, POWDER, LYOPHILIZED, FOR SOLUTION INTRAVENOUS at 08:15

## 2024-02-25 RX ADMIN — METRONIDAZOLE 500 MG: 500 INJECTION, SOLUTION INTRAVENOUS at 11:51

## 2024-02-25 RX ADMIN — ASPIRIN 81 MG: 81 TABLET, COATED ORAL at 08:11

## 2024-02-25 RX ADMIN — SODIUM CHLORIDE, PRESERVATIVE FREE 5 ML: 5 INJECTION INTRAVENOUS at 20:20

## 2024-02-25 RX ADMIN — CEFEPIME 2000 MG: 2 INJECTION, POWDER, FOR SOLUTION INTRAVENOUS at 16:46

## 2024-02-25 RX ADMIN — GABAPENTIN 1200 MG: 300 CAPSULE ORAL at 20:18

## 2024-02-25 RX ADMIN — Medication 1 CAPSULE: at 08:12

## 2024-02-25 RX ADMIN — AMLODIPINE BESYLATE 5 MG: 5 TABLET ORAL at 08:11

## 2024-02-25 RX ADMIN — HYDROMORPHONE HYDROCHLORIDE 1 MG: 1 INJECTION, SOLUTION INTRAMUSCULAR; INTRAVENOUS; SUBCUTANEOUS at 05:26

## 2024-02-25 RX ADMIN — GABAPENTIN 1200 MG: 300 CAPSULE ORAL at 08:11

## 2024-02-25 RX ADMIN — CEFEPIME 2000 MG: 2 INJECTION, POWDER, FOR SOLUTION INTRAVENOUS at 01:28

## 2024-02-25 RX ADMIN — VANCOMYCIN HYDROCHLORIDE 1250 MG: 1.25 INJECTION, POWDER, LYOPHILIZED, FOR SOLUTION INTRAVENOUS at 21:30

## 2024-02-25 RX ADMIN — CEFEPIME 2000 MG: 2 INJECTION, POWDER, FOR SOLUTION INTRAVENOUS at 08:14

## 2024-02-25 RX ADMIN — ALLOPURINOL 100 MG: 100 TABLET ORAL at 08:11

## 2024-02-25 RX ADMIN — METRONIDAZOLE 500 MG: 500 INJECTION, SOLUTION INTRAVENOUS at 20:23

## 2024-02-25 RX ADMIN — Medication 6 MG: at 21:45

## 2024-02-25 RX ADMIN — METOPROLOL SUCCINATE 50 MG: 50 TABLET, EXTENDED RELEASE ORAL at 08:12

## 2024-02-25 RX ADMIN — METRONIDAZOLE 500 MG: 500 INJECTION, SOLUTION INTRAVENOUS at 03:35

## 2024-02-25 ASSESSMENT — PAIN DESCRIPTION - ORIENTATION
ORIENTATION: RIGHT
ORIENTATION: RIGHT;LEFT;ANTERIOR

## 2024-02-25 ASSESSMENT — PAIN SCALES - GENERAL
PAINLEVEL_OUTOF10: 8
PAINLEVEL_OUTOF10: 0
PAINLEVEL_OUTOF10: 4
PAINLEVEL_OUTOF10: 3
PAINLEVEL_OUTOF10: 0

## 2024-02-25 ASSESSMENT — PAIN DESCRIPTION - DESCRIPTORS
DESCRIPTORS: DULL
DESCRIPTORS: NUMBNESS;TINGLING

## 2024-02-25 ASSESSMENT — PAIN DESCRIPTION - LOCATION
LOCATION: HEAD
LOCATION: LEG

## 2024-02-26 PROBLEM — Z88.1 ALLERGY TO MULTIPLE ANTIBIOTICS: Status: ACTIVE | Noted: 2024-02-26

## 2024-02-26 PROBLEM — M86.9 OSTEOMYELITIS OF ANKLE AND FOOT (HCC): Status: ACTIVE | Noted: 2024-02-26

## 2024-02-26 PROBLEM — T81.49XA POSTOPERATIVE WOUND INFECTION: Status: ACTIVE | Noted: 2024-02-26

## 2024-02-26 LAB
ALBUMIN SERPL-MCNC: 2.6 G/DL (ref 3.5–5)
ANION GAP SERPL CALC-SCNC: 4 MMOL/L (ref 5–15)
BASOPHILS # BLD: 0.1 K/UL (ref 0–0.1)
BASOPHILS NFR BLD: 1 % (ref 0–1)
BUN SERPL-MCNC: 12 MG/DL (ref 6–20)
BUN/CREAT SERPL: 18 (ref 12–20)
CALCIUM SERPL-MCNC: 9.1 MG/DL (ref 8.5–10.1)
CHLORIDE SERPL-SCNC: 106 MMOL/L (ref 97–108)
CO2 SERPL-SCNC: 31 MMOL/L (ref 21–32)
CREAT SERPL-MCNC: 0.66 MG/DL (ref 0.7–1.3)
DIFFERENTIAL METHOD BLD: ABNORMAL
EOSINOPHIL # BLD: 0.4 K/UL (ref 0–0.4)
EOSINOPHIL NFR BLD: 4 % (ref 0–7)
ERYTHROCYTE [DISTWIDTH] IN BLOOD BY AUTOMATED COUNT: 13.6 % (ref 11.5–14.5)
GLUCOSE SERPL-MCNC: 119 MG/DL (ref 65–100)
HCT VFR BLD AUTO: 38.4 % (ref 36.6–50.3)
HGB BLD-MCNC: 12.3 G/DL (ref 12.1–17)
IMM GRANULOCYTES # BLD AUTO: 0.1 K/UL (ref 0–0.04)
IMM GRANULOCYTES NFR BLD AUTO: 1 % (ref 0–0.5)
LYMPHOCYTES # BLD: 1.8 K/UL (ref 0.8–3.5)
LYMPHOCYTES NFR BLD: 17 % (ref 12–49)
MAGNESIUM SERPL-MCNC: 1.6 MG/DL (ref 1.6–2.4)
MCH RBC QN AUTO: 29.1 PG (ref 26–34)
MCHC RBC AUTO-ENTMCNC: 32 G/DL (ref 30–36.5)
MCV RBC AUTO: 91 FL (ref 80–99)
MONOCYTES # BLD: 1.2 K/UL (ref 0–1)
MONOCYTES NFR BLD: 11 % (ref 5–13)
NEUTS SEG # BLD: 7.1 K/UL (ref 1.8–8)
NEUTS SEG NFR BLD: 66 % (ref 32–75)
NRBC # BLD: 0 K/UL (ref 0–0.01)
NRBC BLD-RTO: 0 PER 100 WBC
PHOSPHATE SERPL-MCNC: 3.1 MG/DL (ref 2.6–4.7)
PLATELET # BLD AUTO: 295 K/UL (ref 150–400)
PMV BLD AUTO: 10.7 FL (ref 8.9–12.9)
POTASSIUM SERPL-SCNC: 4 MMOL/L (ref 3.5–5.1)
RBC # BLD AUTO: 4.22 M/UL (ref 4.1–5.7)
SODIUM SERPL-SCNC: 141 MMOL/L (ref 136–145)
WBC # BLD AUTO: 10.6 K/UL (ref 4.1–11.1)

## 2024-02-26 PROCEDURE — 36415 COLL VENOUS BLD VENIPUNCTURE: CPT

## 2024-02-26 PROCEDURE — 97530 THERAPEUTIC ACTIVITIES: CPT

## 2024-02-26 PROCEDURE — 1100000000 HC RM PRIVATE

## 2024-02-26 PROCEDURE — 80069 RENAL FUNCTION PANEL: CPT

## 2024-02-26 PROCEDURE — 6370000000 HC RX 637 (ALT 250 FOR IP): Performed by: NURSE PRACTITIONER

## 2024-02-26 PROCEDURE — 6360000002 HC RX W HCPCS: Performed by: INTERNAL MEDICINE

## 2024-02-26 PROCEDURE — 85025 COMPLETE CBC W/AUTO DIFF WBC: CPT

## 2024-02-26 PROCEDURE — 6370000000 HC RX 637 (ALT 250 FOR IP): Performed by: INTERNAL MEDICINE

## 2024-02-26 PROCEDURE — 83735 ASSAY OF MAGNESIUM: CPT

## 2024-02-26 PROCEDURE — 2580000003 HC RX 258: Performed by: FAMILY MEDICINE

## 2024-02-26 PROCEDURE — 6370000000 HC RX 637 (ALT 250 FOR IP): Performed by: FAMILY MEDICINE

## 2024-02-26 PROCEDURE — 6360000002 HC RX W HCPCS: Performed by: FAMILY MEDICINE

## 2024-02-26 PROCEDURE — 94761 N-INVAS EAR/PLS OXIMETRY MLT: CPT

## 2024-02-26 PROCEDURE — 2580000003 HC RX 258: Performed by: INTERNAL MEDICINE

## 2024-02-26 PROCEDURE — 99223 1ST HOSP IP/OBS HIGH 75: CPT | Performed by: INTERNAL MEDICINE

## 2024-02-26 PROCEDURE — A4216 STERILE WATER/SALINE, 10 ML: HCPCS | Performed by: INTERNAL MEDICINE

## 2024-02-26 RX ORDER — METRONIDAZOLE 500 MG/1
500 TABLET ORAL EVERY 8 HOURS SCHEDULED
Status: DISCONTINUED | OUTPATIENT
Start: 2024-02-26 | End: 2024-02-26

## 2024-02-26 RX ADMIN — GABAPENTIN 1200 MG: 300 CAPSULE ORAL at 09:49

## 2024-02-26 RX ADMIN — ASPIRIN 81 MG: 81 TABLET, COATED ORAL at 09:49

## 2024-02-26 RX ADMIN — ENOXAPARIN SODIUM 90 MG: 100 INJECTION SUBCUTANEOUS at 21:44

## 2024-02-26 RX ADMIN — SODIUM CHLORIDE 725 MG: 9 INJECTION INTRAMUSCULAR; INTRAVENOUS; SUBCUTANEOUS at 16:06

## 2024-02-26 RX ADMIN — Medication 1 CAPSULE: at 09:50

## 2024-02-26 RX ADMIN — WATER 2000 MG: 1 INJECTION INTRAMUSCULAR; INTRAVENOUS; SUBCUTANEOUS at 17:30

## 2024-02-26 RX ADMIN — METRONIDAZOLE 500 MG: 500 INJECTION, SOLUTION INTRAVENOUS at 04:58

## 2024-02-26 RX ADMIN — AMLODIPINE BESYLATE 5 MG: 5 TABLET ORAL at 09:49

## 2024-02-26 RX ADMIN — ACETAMINOPHEN 650 MG: 325 TABLET ORAL at 14:07

## 2024-02-26 RX ADMIN — ENOXAPARIN SODIUM 90 MG: 100 INJECTION SUBCUTANEOUS at 09:50

## 2024-02-26 RX ADMIN — VANCOMYCIN HYDROCHLORIDE 1250 MG: 1.25 INJECTION, POWDER, LYOPHILIZED, FOR SOLUTION INTRAVENOUS at 10:00

## 2024-02-26 RX ADMIN — SODIUM CHLORIDE, PRESERVATIVE FREE 10 ML: 5 INJECTION INTRAVENOUS at 21:45

## 2024-02-26 RX ADMIN — GABAPENTIN 1200 MG: 300 CAPSULE ORAL at 21:44

## 2024-02-26 RX ADMIN — ACETAMINOPHEN 650 MG: 325 TABLET ORAL at 05:34

## 2024-02-26 RX ADMIN — CEFEPIME 2000 MG: 2 INJECTION, POWDER, FOR SOLUTION INTRAVENOUS at 00:35

## 2024-02-26 RX ADMIN — METOPROLOL SUCCINATE 50 MG: 50 TABLET, EXTENDED RELEASE ORAL at 09:50

## 2024-02-26 RX ADMIN — CEFEPIME 2000 MG: 2 INJECTION, POWDER, FOR SOLUTION INTRAVENOUS at 09:57

## 2024-02-26 RX ADMIN — ALLOPURINOL 100 MG: 100 TABLET ORAL at 09:49

## 2024-02-26 RX ADMIN — Medication 6 MG: at 23:16

## 2024-02-26 ASSESSMENT — PAIN SCALES - GENERAL
PAINLEVEL_OUTOF10: 7
PAINLEVEL_OUTOF10: 3
PAINLEVEL_OUTOF10: 5

## 2024-02-26 ASSESSMENT — PAIN DESCRIPTION - LOCATION
LOCATION: NECK
LOCATION: NECK

## 2024-02-26 ASSESSMENT — PAIN DESCRIPTION - DESCRIPTORS: DESCRIPTORS: ACHING

## 2024-02-26 ASSESSMENT — PAIN DESCRIPTION - ORIENTATION: ORIENTATION: RIGHT

## 2024-02-27 LAB
CK SERPL-CCNC: 32 U/L (ref 39–308)
CRP SERPL-MCNC: 1.02 MG/DL (ref 0–0.3)

## 2024-02-27 PROCEDURE — 94761 N-INVAS EAR/PLS OXIMETRY MLT: CPT

## 2024-02-27 PROCEDURE — 6370000000 HC RX 637 (ALT 250 FOR IP): Performed by: INTERNAL MEDICINE

## 2024-02-27 PROCEDURE — 82550 ASSAY OF CK (CPK): CPT

## 2024-02-27 PROCEDURE — 36415 COLL VENOUS BLD VENIPUNCTURE: CPT

## 2024-02-27 PROCEDURE — 97530 THERAPEUTIC ACTIVITIES: CPT

## 2024-02-27 PROCEDURE — 86140 C-REACTIVE PROTEIN: CPT

## 2024-02-27 PROCEDURE — 6360000002 HC RX W HCPCS: Performed by: INTERNAL MEDICINE

## 2024-02-27 PROCEDURE — A4216 STERILE WATER/SALINE, 10 ML: HCPCS | Performed by: INTERNAL MEDICINE

## 2024-02-27 PROCEDURE — 6370000000 HC RX 637 (ALT 250 FOR IP): Performed by: NURSE PRACTITIONER

## 2024-02-27 PROCEDURE — 2580000003 HC RX 258: Performed by: FAMILY MEDICINE

## 2024-02-27 PROCEDURE — 1100000000 HC RM PRIVATE

## 2024-02-27 PROCEDURE — 99232 SBSQ HOSP IP/OBS MODERATE 35: CPT | Performed by: INTERNAL MEDICINE

## 2024-02-27 PROCEDURE — 2580000003 HC RX 258: Performed by: INTERNAL MEDICINE

## 2024-02-27 PROCEDURE — 6370000000 HC RX 637 (ALT 250 FOR IP): Performed by: FAMILY MEDICINE

## 2024-02-27 RX ORDER — NORTRIPTYLINE HYDROCHLORIDE 25 MG/1
50 CAPSULE ORAL NIGHTLY
Status: DISCONTINUED | OUTPATIENT
Start: 2024-02-27 | End: 2024-02-28 | Stop reason: HOSPADM

## 2024-02-27 RX ORDER — AMLODIPINE BESYLATE 5 MG/1
10 TABLET ORAL DAILY
Status: DISCONTINUED | OUTPATIENT
Start: 2024-02-27 | End: 2024-02-28 | Stop reason: HOSPADM

## 2024-02-27 RX ADMIN — Medication 1 CAPSULE: at 09:23

## 2024-02-27 RX ADMIN — WATER 2000 MG: 1 INJECTION INTRAMUSCULAR; INTRAVENOUS; SUBCUTANEOUS at 18:18

## 2024-02-27 RX ADMIN — ALLOPURINOL 100 MG: 100 TABLET ORAL at 09:22

## 2024-02-27 RX ADMIN — METOPROLOL SUCCINATE 50 MG: 50 TABLET, EXTENDED RELEASE ORAL at 09:09

## 2024-02-27 RX ADMIN — NORTRIPTYLINE HYDROCHLORIDE 50 MG: 25 CAPSULE ORAL at 20:38

## 2024-02-27 RX ADMIN — Medication 6 MG: at 22:21

## 2024-02-27 RX ADMIN — ENOXAPARIN SODIUM 90 MG: 100 INJECTION SUBCUTANEOUS at 20:38

## 2024-02-27 RX ADMIN — GABAPENTIN 1200 MG: 300 CAPSULE ORAL at 20:38

## 2024-02-27 RX ADMIN — ENOXAPARIN SODIUM 90 MG: 100 INJECTION SUBCUTANEOUS at 09:23

## 2024-02-27 RX ADMIN — SODIUM CHLORIDE, PRESERVATIVE FREE 10 ML: 5 INJECTION INTRAVENOUS at 09:09

## 2024-02-27 RX ADMIN — ASPIRIN 81 MG: 81 TABLET, COATED ORAL at 09:09

## 2024-02-27 RX ADMIN — SODIUM CHLORIDE 725 MG: 9 INJECTION INTRAMUSCULAR; INTRAVENOUS; SUBCUTANEOUS at 16:40

## 2024-02-27 RX ADMIN — GABAPENTIN 1200 MG: 300 CAPSULE ORAL at 09:09

## 2024-02-27 RX ADMIN — AMLODIPINE BESYLATE 10 MG: 5 TABLET ORAL at 09:09

## 2024-02-27 RX ADMIN — SODIUM CHLORIDE, PRESERVATIVE FREE 10 ML: 5 INJECTION INTRAVENOUS at 20:38

## 2024-02-27 NOTE — PLAN OF CARE
Problem: Occupational Therapy - Adult  Goal: By Discharge: Performs self-care activities at highest level of function for planned discharge setting.  See evaluation for individualized goals.  Description: FUNCTIONAL STATUS PRIOR TO ADMISSION:  Patient was active and independent at baseline. Had initial fall with fracture in July 2023 and underwent surgical repair then d/c to IPR x 10 days. Had been doing well at home and had a lot of equipment available to him and using RW for transfers.    , ADL Assistance: Independent,  ,  ,  ,  ,  , Homemaking Assistance: Independent, Ambulation Assistance: Independent, Transfer Assistance: Independent, Active : Yes     HOME SUPPORT: Patient lived with his supportive wife.    Occupational Therapy Goals:  Initiated 2/25/2024  1.  Patient will perform lower body dressing with Modified [place] within 7 day(s).  2.  Patient will perform bathing with Set-up and Supervision within 7 day(s).  3.  Patient will perform toilet transfers with Supervision within 7 day(s).  4.  Patient will perform all aspects of toileting with Supervision within 7 day(s).  5.  Patient will participate in upper extremity therapeutic exercise/activities with [place] for 10 minutes within 7 day(s).    Outcome: Progressing     OCCUPATIONAL THERAPY EVALUATION    Patient: Kingsley Wilson (78 y.o. male)  Date: 2/25/2024  Primary Diagnosis: Cellulitis of right lower extremity [L03.115]  Ankle wound, left, initial encounter [S91.002A]  Acute deep vein thrombosis (DVT) of popliteal vein of right lower extremity (HCC) [I82.431]  Procedure(s) (LRB):  REMOVAL HARDWARE RIGHT LATERAL LOWER EXTREMITY, WASHOUT RIGHT ANKLE ABCESS (Right) 3 Days Post-Op     Precautions: Weight Bearing, Fall Risk Right Lower Extremity Weight Bearing: Non Weight Bearing                ASSESSMENT :  The patient is limited by decreased functional mobility, independence in ADLs, activity tolerance, balance on POD 3 s/p R ankle 
  Problem: Pain  Goal: Verbalizes/displays adequate comfort level or baseline comfort level  2/23/2024 2202 by Gita Luque  Outcome: Progressing  2/23/2024 2022 by Susanne Kauffman RN  Outcome: Progressing     Problem: Chronic Conditions and Co-morbidities  Goal: Patient's chronic conditions and co-morbidity symptoms are monitored and maintained or improved  2/23/2024 2202 by Gita Luque  Outcome: Progressing  Flowsheets (Taken 2/23/2024 2040)  Care Plan - Patient's Chronic Conditions and Co-Morbidity Symptoms are Monitored and Maintained or Improved: Monitor and assess patient's chronic conditions and comorbid symptoms for stability, deterioration, or improvement  2/23/2024 2022 by Susanne Kauffman RN  Outcome: Progressing     Problem: Safety - Adult  Goal: Free from fall injury  2/23/2024 2202 by Gita Luque  Outcome: Progressing  2/23/2024 2022 by Susanne Kauffman RN  Outcome: Progressing     Problem: Skin/Tissue Integrity  Goal: Absence of new skin breakdown  Description: 1.  Monitor for areas of redness and/or skin breakdown  2.  Assess vascular access sites hourly  3.  Every 4-6 hours minimum:  Change oxygen saturation probe site  4.  Every 4-6 hours:  If on nasal continuous positive airway pressure, respiratory therapy assess nares and determine need for appliance change or resting period.  2/23/2024 2202 by Gita Luque  Outcome: Progressing  2/23/2024 2022 by Susanne Kauffman, RN  Outcome: Progressing     Problem: ABCDS Injury Assessment  Goal: Absence of physical injury  2/23/2024 2202 by Gita Luque  Outcome: Progressing  2/23/2024 2022 by Susanne Kauffman RN  Outcome: Progressing     
  Problem: Pain  Goal: Verbalizes/displays adequate comfort level or baseline comfort level  Outcome: Progressing     Problem: Chronic Conditions and Co-morbidities  Goal: Patient's chronic conditions and co-morbidity symptoms are monitored and maintained or improved  Outcome: Progressing     Problem: Safety - Adult  Goal: Free from fall injury  Outcome: Progressing     
  Problem: Pain  Goal: Verbalizes/displays adequate comfort level or baseline comfort level  Outcome: Progressing     Problem: Chronic Conditions and Co-morbidities  Goal: Patient's chronic conditions and co-morbidity symptoms are monitored and maintained or improved  Outcome: Progressing     Problem: Safety - Adult  Goal: Free from fall injury  Outcome: Progressing     Problem: Skin/Tissue Integrity  Goal: Absence of new skin breakdown  Description: 1.  Monitor for areas of redness and/or skin breakdown  2.  Assess vascular access sites hourly  3.  Every 4-6 hours minimum:  Change oxygen saturation probe site  4.  Every 4-6 hours:  If on nasal continuous positive airway pressure, respiratory therapy assess nares and determine need for appliance change or resting period.  Outcome: Progressing     Problem: ABCDS Injury Assessment  Goal: Absence of physical injury  Outcome: Progressing     
  Problem: Pain  Goal: Verbalizes/displays adequate comfort level or baseline comfort level  Outcome: Progressing     Problem: Chronic Conditions and Co-morbidities  Goal: Patient's chronic conditions and co-morbidity symptoms are monitored and maintained or improved  Outcome: Progressing     Problem: Safety - Adult  Goal: Free from fall injury  Outcome: Progressing     Problem: Skin/Tissue Integrity  Goal: Absence of new skin breakdown  Description: 1.  Monitor for areas of redness and/or skin breakdown  2.  Assess vascular access sites hourly  3.  Every 4-6 hours minimum:  Change oxygen saturation probe site  4.  Every 4-6 hours:  If on nasal continuous positive airway pressure, respiratory therapy assess nares and determine need for appliance change or resting period.  Outcome: Progressing     Problem: ABCDS Injury Assessment  Goal: Absence of physical injury  Outcome: Progressing     
  Problem: Pain  Goal: Verbalizes/displays adequate comfort level or baseline comfort level  Outcome: Progressing     Problem: Chronic Conditions and Co-morbidities  Goal: Patient's chronic conditions and co-morbidity symptoms are monitored and maintained or improved  Outcome: Progressing  Flowsheets (Taken 2/25/2024 1949)  Care Plan - Patient's Chronic Conditions and Co-Morbidity Symptoms are Monitored and Maintained or Improved: Monitor and assess patient's chronic conditions and comorbid symptoms for stability, deterioration, or improvement     Problem: Safety - Adult  Goal: Free from fall injury  Outcome: Progressing     Problem: Skin/Tissue Integrity  Goal: Absence of new skin breakdown  Description: 1.  Monitor for areas of redness and/or skin breakdown  2.  Assess vascular access sites hourly  3.  Every 4-6 hours minimum:  Change oxygen saturation probe site  4.  Every 4-6 hours:  If on nasal continuous positive airway pressure, respiratory therapy assess nares and determine need for appliance change or resting period.  Outcome: Progressing     Problem: ABCDS Injury Assessment  Goal: Absence of physical injury  Outcome: Progressing     Problem: Occupational Therapy - Adult  Goal: By Discharge: Performs self-care activities at highest level of function for planned discharge setting.  See evaluation for individualized goals.  Description: FUNCTIONAL STATUS PRIOR TO ADMISSION:  Patient was active and independent at baseline. Had initial fall with fracture in July 2023 and underwent surgical repair then d/c to IPR x 10 days. Had been doing well at home and had a lot of equipment available to him and using RW for transfers.    , ADL Assistance: Independent,  ,  ,  ,  ,  , Homemaking Assistance: Independent, Ambulation Assistance: Independent, Transfer Assistance: Independent, Active : Yes     HOME SUPPORT: Patient lived with his supportive wife.    Occupational Therapy Goals:  Initiated 2/25/2024  1.  
  Problem: Physical Therapy - Adult  Goal: By Discharge: Performs mobility at highest level of function for planned discharge setting.  See evaluation for individualized goals.  Description: FUNCTIONAL STATUS PRIOR TO ADMISSION: Patient was independent without use of DME.  Patient with several falls this year prior to and since R ankle fracture and ORIF in July 2023.     HOME SUPPORT PRIOR TO ADMISSION: The patient lived with his wife who would assist intermittently if needed.    Physical Therapy Goals  Initiated 2/24/2024  1.  Patient will move from supine to sit and sit to supine, scoot up and down, and roll side to side in bed with independence within 7 day(s).    2.  Patient will perform sit to stand with minimal assistance within 7 day(s).  3.  Patient will transfer from bed to chair and chair to bed with minimal assistance using the least restrictive device within 7 day(s).  4.  Patient will ambulate with minimal assistance for 5 feet with the least restrictive device within 7 day(s). (Pending change to PWB)  5.  Patient will ascend/descend 4 stairs with 2 handrail(s) with minimal assistance within 7 day(s). (Pending change to PWB)    Outcome: Progressing   PHYSICAL THERAPY TREATMENT    Patient: Kingsley Wilson (78 y.o. male)  Date: 2/26/2024  Diagnosis: Cellulitis of right lower extremity [L03.115]  Ankle wound, left, initial encounter [S91.002A]  Acute deep vein thrombosis (DVT) of popliteal vein of right lower extremity (HCC) [I82.431] Ankle wound, left, initial encounter  Procedure(s) (LRB):  REMOVAL HARDWARE RIGHT LATERAL LOWER EXTREMITY, WASHOUT RIGHT ANKLE ABCESS (Right) 4 Days Post-Op  Precautions: Weight Bearing, Fall Risk Right Lower Extremity Weight Bearing: Non Weight Bearing                    ASSESSMENT:  Patient continues to benefit from skilled PT services and is slowly progressing towards goals. Pt unsteady with squat pivot transfers with maintaining NWB on RLE, requiring three attempts to 
  Problem: Physical Therapy - Adult  Goal: By Discharge: Performs mobility at highest level of function for planned discharge setting.  See evaluation for individualized goals.  Description: FUNCTIONAL STATUS PRIOR TO ADMISSION: Patient was independent without use of DME.  Patient with several falls this year prior to and since R ankle fracture and ORIF in July 2023.     HOME SUPPORT PRIOR TO ADMISSION: The patient lived with his wife who would assist intermittently if needed.    Physical Therapy Goals  Initiated 2/24/2024  1.  Patient will move from supine to sit and sit to supine, scoot up and down, and roll side to side in bed with independence within 7 day(s).    2.  Patient will perform sit to stand with minimal assistance within 7 day(s).  3.  Patient will transfer from bed to chair and chair to bed with minimal assistance using the least restrictive device within 7 day(s).  4.  Patient will ambulate with minimal assistance for 5 feet with the least restrictive device within 7 day(s). (Pending change to PWB)  5.  Patient will ascend/descend 4 stairs with 2 handrail(s) with minimal assistance within 7 day(s). (Pending change to PWB)    Outcome: Progressing   PHYSICAL THERAPY TREATMENT    Patient: Kingsley Wilson (78 y.o. male)  Date: 2/27/2024  Diagnosis: Cellulitis of right lower extremity [L03.115]  Ankle wound, left, initial encounter [S91.002A]  Acute deep vein thrombosis (DVT) of popliteal vein of right lower extremity (HCC) [I82.431] Ankle wound, left, initial encounter  Procedure(s) (LRB):  REMOVAL HARDWARE RIGHT LATERAL LOWER EXTREMITY, WASHOUT RIGHT ANKLE ABCESS (Right) 5 Days Post-Op  Precautions: Weight Bearing, Fall Risk Right Lower Extremity Weight Bearing: Non Weight Bearing                    ASSESSMENT:  Patient continues to benefit from skilled PT services.Pt comes to stand with min assist.Pt performs pivot to knee walker with min assist of 2.Pt needs cues and extra time for positioning.Pt was 
ascend/descend 4 stairs with 2 handrail(s) with minimal assistance within 7 day(s). (Pending change to PWB)    2/24/2024 1618 by Maggie Turner, PT  Outcome: Progressing     
Education  Education Given To: Patient  Education Provided: Role of Therapy;Plan of Care  Education Method: Verbal  Barriers to Learning: None  Education Outcome: Verbalized understanding    Thank you for this referral.  BEVERLEY Hutchinson/L  Minutes: 18   
for ADLs:  Bed Mobility:  Bed Mobility Training  Scooting: Stand-by assistance (chair level)     Transfers:   Transfer Training  Transfer Training: Yes  Overall Level of Assistance: Minimum assistance;Assist X2  Sit to Stand: Minimum assistance;Assist X1  Stand to Sit: Minimum assistance;Assist X1  Stand Pivot Transfers: Minimum assistance;Assist X2  Toilet Transfer: Contact-guard assistance;Minimum assistance (simulated with knee scooter)         Balance:  Standing: With support  Balance  Sitting: Intact  Standing: With support  Standing - Static: Constant support;Good  Standing - Dynamic: Constant support;Fair (with knee scooter)    ADL Intervention:    Grooming: Setup   Grooming Skilled Clinical Factors: chair level simulated     Pain Rating:  Pt stating thigh pain with activity (pt stating due to using knee scooter and quick to fatigue).    Pain Intervention(s):   nursing notified      Activity Tolerance:   Good  Please refer to the flowsheet for vital signs taken during this treatment.    After treatment:   Patient left in no apparent distress sitting up in chair and Call bell within reach    COMMUNICATION/EDUCATION:   The patient's plan of care was discussed with: physical therapist and registered nurse    Patient Education  Education Given To: Patient  Education Provided: Role of Therapy;Plan of Care;Home Exercise Program;Precautions;ADL Adaptive Strategies;Transfer Training;Energy Conservation;Fall Prevention Strategies  Education Method: Demonstration;Verbal;Teach Back  Barriers to Learning: None  Education Outcome: Verbalized understanding;Demonstrated understanding;Continued education needed    Thank you for this referral.  Yudi Machuca OT  Minutes: 20    
help is needed moving from lying on your back to sitting on the side of a flat bed without using bedrails?: None  How much help is needed moving to and from a bed to a chair?: Total  How much help is needed standing up from a chair using your arms?: Total  How much help is needed walking in hospital room?: Total  How much help is needed climbing 3-5 steps with a railing?: Total    -PAC Inpatient Mobility Raw Score : 12  -PAC Inpatient T-Scale Score : 35.33     Cutoff score ?171,2,3 had higher odds of discharging home with home health or need of SNF/IPR.    1. Kayley Hwang, Georgia Costello, Efren Palumbo, Jessie Alberts, Jose Sanchez, Dionisio Hwang.  Validity of the AM-PAC “6-Clicks” Inpatient Daily Activity and Basic Mobility Short Forms. Physical Therapy Mar 2014, 94 (3) 379-391; DOI: 10.2522/ptj.[id number]  2. Laron ACOSTA, Malik J, José J, Inés J. Association of AM-PAC \"6-Clicks\" Basic Mobility and Daily Activity Scores With Discharge Destination. Phys Ther. 2021 Apr 4;101(4):kejn004. doi: 10.1093/ptj/prhd800. PMID: 75179124.  3. Red SIN, Wil D, Ruthann S, Mary K, Michael S. Activity Measure for Post-Acute Care \"6-Clicks\" Basic Mobility Scores Predict Discharge Destination After Acute Care Hospitalization in Select Patient Groups: A Retrospective, Observational Study. Arch Rehabil Res Clin Transl. 2022 Jul 16;4(3):239679. doi: 10.1016/j.arrct.2022.772987. PMID: 04939286; PMCID: [id number].  4. Eri HESS, Ratna S, Ezra W, Ina P. AM-PAC Short Forms Manual 4.0. Revised 2/2020.                                                                                                                                                                                                                               Pain Rating:  None reported     Activity Tolerance:   Fair     After treatment:   Patient left in no apparent distress in bed, Call bell within reach, and Caregiver / family

## 2024-02-27 NOTE — ANESTHESIA POSTPROCEDURE EVALUATION
Department of Anesthesiology  Postprocedure Note    Patient: Kingsley Wilson  MRN: [id number]  YOB: 1945  Date of evaluation: 2/27/2024    Procedure Summary       Date: 02/22/24 Room / Location: [facility] MAIN OR F7 / [facility] MAIN OR    Anesthesia Start: 1530 Anesthesia Stop: 1738    Procedure: REMOVAL HARDWARE RIGHT LATERAL LOWER EXTREMITY, WASHOUT RIGHT ANKLE ABCESS (Right: Ankle) Diagnosis:       Cellulitis of right leg      (Cellulitis of right leg [L03.115])    Surgeons: Silverio Martinez MD Responsible Provider: Misha Agosto MD    Anesthesia Type: General ASA Status: 3            Anesthesia Type: General    Tom Phase I: Tom Score: 8    Tom Phase II:      Anesthesia Post Evaluation    Patient location during evaluation: PACU  Patient participation: complete - patient participated  Level of consciousness: awake  Pain score: 0  Airway patency: patent  Nausea & Vomiting: no nausea and no vomiting  Cardiovascular status: blood pressure returned to baseline  Respiratory status: acceptable  Hydration status: euvolemic  Multimodal analgesia pain management approach  Pain management: adequate    No notable events documented.

## 2024-02-28 ENCOUNTER — APPOINTMENT (OUTPATIENT)
Facility: HOSPITAL | Age: 79
DRG: 478 | End: 2024-02-28
Attending: INTERNAL MEDICINE
Payer: MEDICARE

## 2024-02-28 VITALS
OXYGEN SATURATION: 98 % | SYSTOLIC BLOOD PRESSURE: 153 MMHG | TEMPERATURE: 99.1 F | HEART RATE: 90 BPM | DIASTOLIC BLOOD PRESSURE: 76 MMHG | HEIGHT: 71 IN | BODY MASS INDEX: 27.77 KG/M2 | WEIGHT: 198.4 LBS | RESPIRATION RATE: 16 BRPM

## 2024-02-28 PROCEDURE — 6370000000 HC RX 637 (ALT 250 FOR IP): Performed by: INTERNAL MEDICINE

## 2024-02-28 PROCEDURE — 02HV33Z INSERTION OF INFUSION DEVICE INTO SUPERIOR VENA CAVA, PERCUTANEOUS APPROACH: ICD-10-PCS | Performed by: INTERNAL MEDICINE

## 2024-02-28 PROCEDURE — 99231 SBSQ HOSP IP/OBS SF/LOW 25: CPT | Performed by: INTERNAL MEDICINE

## 2024-02-28 PROCEDURE — 2580000003 HC RX 258: Performed by: FAMILY MEDICINE

## 2024-02-28 PROCEDURE — 36573 INSJ PICC RS&I 5 YR+: CPT

## 2024-02-28 PROCEDURE — 94761 N-INVAS EAR/PLS OXIMETRY MLT: CPT

## 2024-02-28 PROCEDURE — 6360000002 HC RX W HCPCS: Performed by: INTERNAL MEDICINE

## 2024-02-28 PROCEDURE — 6370000000 HC RX 637 (ALT 250 FOR IP): Performed by: FAMILY MEDICINE

## 2024-02-28 RX ORDER — NORTRIPTYLINE HYDROCHLORIDE 50 MG/1
50 CAPSULE ORAL NIGHTLY
Qty: 30 CAPSULE | Refills: 3 | Status: SHIPPED | OUTPATIENT
Start: 2024-02-28

## 2024-02-28 RX ORDER — LACTOBACILLUS RHAMNOSUS GG 10B CELL
1 CAPSULE ORAL
Qty: 30 CAPSULE | Refills: 0 | Status: SHIPPED | OUTPATIENT
Start: 2024-02-29

## 2024-02-28 RX ORDER — LANOLIN ALCOHOL/MO/W.PET/CERES
6 CREAM (GRAM) TOPICAL NIGHTLY PRN
Qty: 30 TABLET | Refills: 0 | Status: SHIPPED | OUTPATIENT
Start: 2024-02-28

## 2024-02-28 RX ORDER — GINSENG 100 MG
CAPSULE ORAL PRN
Status: DISCONTINUED | OUTPATIENT
Start: 2024-02-28 | End: 2024-02-28 | Stop reason: HOSPADM

## 2024-02-28 RX ORDER — AMLODIPINE BESYLATE 10 MG/1
10 TABLET ORAL DAILY
Qty: 30 TABLET | Refills: 3 | Status: SHIPPED | OUTPATIENT
Start: 2024-02-29

## 2024-02-28 RX ADMIN — ASPIRIN 81 MG: 81 TABLET, COATED ORAL at 09:50

## 2024-02-28 RX ADMIN — GABAPENTIN 1200 MG: 300 CAPSULE ORAL at 09:51

## 2024-02-28 RX ADMIN — Medication 1 CAPSULE: at 09:51

## 2024-02-28 RX ADMIN — AMLODIPINE BESYLATE 10 MG: 5 TABLET ORAL at 09:51

## 2024-02-28 RX ADMIN — SODIUM CHLORIDE, PRESERVATIVE FREE 10 ML: 5 INJECTION INTRAVENOUS at 09:52

## 2024-02-28 RX ADMIN — METOPROLOL SUCCINATE 50 MG: 50 TABLET, EXTENDED RELEASE ORAL at 09:51

## 2024-02-28 RX ADMIN — ENOXAPARIN SODIUM 90 MG: 100 INJECTION SUBCUTANEOUS at 09:51

## 2024-02-28 RX ADMIN — ALLOPURINOL 100 MG: 100 TABLET ORAL at 09:51

## 2024-02-28 NOTE — PROGRESS NOTES
Hospitalist Progress Note  Sandra Gonzalez MD  Answering service: [phone] OR [phone] from in house phone        Date of Service:  2024  NAME:  Kingsley Wilson  :  1945  MRN:  [id number]      Admission Summary/HPI:   Patient is a pleasant 78-year-old gentleman admitted for right ankle wound/cellulitis and acute osteomyelitis.       Interval history / Subjective:   States he has no pain due to neuropathy.  Is going to the OR today for washout.  Ortho following     Assessment & Plan:     Right ankle wound/ cellulitis/ ? Acute osteomyelitis of distal fibula.   -CT lower leg: Significant subcutaneous edema. Multiple small fluid collections  surround the distal fibula and contact the plate. Formation and periostitis in the distal fibular shaft concerning for acute osteomyelitis.  - Evaluated by orthopedics.  - Cont vancomycin, cefepime and flagyl.  - Follow cultures -no growth today  - If blood cultures popped positive will get echo-murmur but has history of TAVR  - Pain management: No pain with history of neuropathy  - Plan to go to the OR today for washout     Acute DVT of RT lower leg.  - Developed DVT while on xarelto.  - Hematology was consulted and is following  - Continue therapeutic Lovenox  -On discharge will change to apixaban 10 mg p.o. twice daily for 7 days and then 5 mg p.o. twice daily  - Patient has declined to take Coumadin     Type 2 diabetes  -Recent A1c 6.3. not on medication. cover with SSI     Gout.  - Continue allopurinol     Peripheral neuropathy. Continue gabapentin, nortriptyline     Hypertension.  - On metoprolol  - Uncontrolled, will add amlodipine     Hyperlipidemia.  - Cont crestor      Hx of factor V Leiden.  - While taking xarelto, pt developed DVT. Hematology consulted      Aortic valve stenosis.  - Hx of TAVR and 2023 with Dr. Leiva at [facility].          I have 
                                                                                      Hospitalist Progress Note  Sandra Gonzalez MD  Answering service: [phone] OR [phone] from in house phone        Date of Service:  2024  NAME:  Kingsley Wilson  :  1945  MRN:  [id number]      Admission Summary/HPI:   Patient is a pleasant 78-year-old gentleman admitted for right ankle wound/cellulitis and acute osteomyelitis.       Interval history / Subjective:   States he has no pain due to neuropathy.  Feeling great, no complaints. CRP continues to trend down no growth on Cx     Assessment & Plan:     Right ankle cellulitis with abscess, septic arthritis, Acute osteomyelitis of distal fibula.   -CT lower leg: Significant subcutaneous edema. Multiple small fluid collections  surround the distal fibula and contact the plate. Formation and periostitis in the distal fibular shaft concerning for acute osteomyelitis.  - Evaluated by orthopedics. - washout and hardware removal 24  - Cont vancomycin, cefepime and flagyl.  - Follow cultures -no growth today - We appreciate the multiple Cx/Bx obtained by ortho!  - If blood cultures popped positive will get echo-murmur but has history of TAVR  - Pain management: No pain with history of neuropathy     Acute DVT of RT lower leg.  - Developed DVT while on xarelto.  - Hematology was consulted and is following  - Continue therapeutic Lovenox  -On discharge will change to apixaban 10 mg p.o. twice daily for 7 days and then 5 mg p.o. twice daily  - Patient has declined to take Coumadin     Type 2 diabetes  -Recent A1c 6.3. not on medication. cover with SSI     Gout.  - Continue allopurinol     Peripheral neuropathy. Continue gabapentin, nortriptyline     Hypertension.  - On metoprolol  - Uncontrolled, will add amlodipine     Hyperlipidemia.  - Cont crestor      Hx of factor V Leiden.  - While taking xarelto, pt developed DVT. Hematology consulted      Aortic valve stenosis.  - 
                                                                                      Hospitalist Progress Note  Sandra Gonzalez MD  Answering service: [phone] OR [phone] from in house phone        Date of Service:  2024  NAME:  Kingsley Wilson  :  1945  MRN:  [id number]      Admission Summary/HPI:   Patient is a pleasant 78-year-old gentleman admitted for right ankle wound/cellulitis and acute osteomyelitis.       Interval history / Subjective:   States he has no pain due to neuropathy.  Feeling great, no complaints. CRP continues to trend down. No growth on Cx. ID consulted     Assessment & Plan:     Right ankle cellulitis with abscess, septic arthritis, Acute osteomyelitis of distal fibula.   -CT lower leg: Significant subcutaneous edema. Multiple small fluid collections  surround the distal fibula and contact the plate. Formation and periostitis in the distal fibular shaft concerning for acute osteomyelitis.  - Evaluated by orthopedics. - washout and hardware removal 24  - Cont vancomycin, cefepime and flagyl.  - Follow cultures -NGTD - We appreciate the multiple Cx/Bx obtained by ortho!  - If blood cultures popped positive will get echo-murmur but has history of TAVR  - Pain management: No pain with history of neuropathy     Acute DVT of RT lower leg.  - Developed DVT while on xarelto.  - Hematology was consulted and is following  - Continue therapeutic Lovenox  -On discharge will change to apixaban 10 mg p.o. twice daily for 7 days and then 5 mg p.o. twice daily  - Patient has declined to take Coumadin     Type 2 diabetes  -Recent A1c 6.3. not on medication. cover with SSI     Gout.  - Continue allopurinol     Peripheral neuropathy. Continue gabapentin, nortriptyline     Hypertension.  - On metoprolol  - Uncontrolled, will add amlodipine     Hyperlipidemia.  - Cont crestor      Hx of factor V Leiden.  - While taking xarelto, pt developed DVT. Hematology consulted      Aortic valve 
                   Orthopaedic Progress Note  Post Op day: 1 Day Post-Op    February 23, 2024 6:20 PM     Patient: Kingsley Wilson MRN: [id number]  SSN: [id number]    YOB: 1945  Age: 78 y.o.  Sex: male      Admit date:  2/19/2024  Date of Surgery:  [unfilled]   Procedures:  Procedure(s):  REMOVAL HARDWARE RIGHT LATERAL LOWER EXTREMITY, WASHOUT RIGHT ANKLE ABCESS  Admitting Physician:  Elen Liz MD   Surgeon:  Surgeon(s) and Role:     * Silverio Martinez MD - Primary    Consulting Physician(s): Treatment Team: Attending Provider: Sandra Gonzalez MD; Consulting Physician: Luiz Sims PA; Consulting Provider: Luiz Sims PA; Orthopedic Surgeon: Silverio Martinez MD; Consulting Physician: Schoeneweis, Ann C, APRN - NP; Utilization Reviewer: Celeste Redd RN; Surgeon: Silverio Martinez MD; Charge Nurse: Seipel, Noelle, RN; Registered Nurse: Susanne Kauffman RN; : Carl Lee      Right ankle septic arthritis, acute osteomyelitis of distal s/p wash out and removal of hardware POD 1    ASSESSMENT / PLAN    Pain Control : Acceptable pain control overnight.  Limit pain medication if effecting mentation.   Wound or incisional issue : Right dressing change completed/ incision with moderate bright red drainage. Daily dressing changes  Therapy / Weight Bearing Recommendations : NWB right LE  DVT Prophylaxis : Currently on Lovenox  Medical concerns: following blood cultures, follow cultures from OR. Continue IV abx.        SUBJECTIVE:     Kingsley Wilson is a 78 y.o. male is 1 Day Post-Op s/p Procedure(s):  REMOVAL HARDWARE RIGHT LATERAL LOWER EXTREMITY, WASHOUT RIGHT ANKLE ABCESS with an appropriate level of post-operative pain.  No complaints of nausea, vomiting, dizziness, lightheadedness, chest pain, or shortness of breath.    OBJECTIVE:       Physical Exam:  General: Alert, cooperative, no distress.    Respiratory: Respirations unlabored  Neurological:  
                   Orthopaedic Progress Note  Post Op day: 5 Days Post-Op    February 27, 2024 1:59 PM     Patient: Kingsley Wilson MRN: [id number]  SSN: [id number]    YOB: 1945  Age: 78 y.o.  Sex: male      Admit date:  2/19/2024  Date of Surgery:  [unfilled]   Procedures:  Procedure(s):  REMOVAL HARDWARE RIGHT LATERAL LOWER EXTREMITY, WASHOUT RIGHT ANKLE ABCESS  Admitting Physician:  Elen Liz MD   Surgeon:  Surgeon(s) and Role:     * Silverio Martinez MD - Primary    Consulting Physician(s): Treatment Team: Attending Provider: Jus Haque MD; Consulting Physician: Luiz Sims PA; Consulting Provider: Luiz Sims PA; Orthopedic Surgeon: Silverio Martinez MD; Consulting Physician: Schoeneweis, Ann C, APRN - NP; Utilization Reviewer: Celeste Redd RN; Surgeon: Silverio Martinez MD; : Carl Lee; Nursing Student: Raeann Cornejo; Consulting Physician: Erick Clarke DO; Registered Nurse: Sarah Sanchez RN; Charge Nurse: Seipel, Noelle, RN; Patient Care Tech: Sharon Navarro; Registered Nurse: Jameson Chamorro, RN; Occupational Therapist: Yudi Machuca OT; Physical Therapist Assistant: Ruddy Paredes PTA          ASSESSMENT / PLAN :   Pain Control : Acceptable pain control overnight.    Wound or incisional issue : Wound care / dressing changes daily. Stable  Therapy / Weight Bearing Recommendations : NWB  DVT Prophylaxis : Currently on eliquis  Disposition : CM contacted KO, ayaka, LEELAW and to follow up with bed availability.   Medical Concerns : ID recommended IV abx        SUBJECTIVE:     Kingsley Wilson is a 78 y.o. male is 5 Days Post-Op s/p Procedure(s):  REMOVAL HARDWARE RIGHT LATERAL LOWER EXTREMITY, WASHOUT RIGHT ANKLE ABCESS with an appropriate level of post-operative pain.  No complaints of nausea, vomiting, dizziness, lightheadedness, chest pain, or shortness of breath.    OBJECTIVE:       Physical Exam:  General: Alert, 
  Physician Progress Note      PATIENT:               SHANI BASURTO  CSN #:                  [id number]  :                       1945  ADMIT DATE:       2024 5:00 PM  DISCH DATE:  RESPONDING  PROVIDER #:        Sandra Gonzalez MD          QUERY TEXT:    Good afternoon  Pt admitted with R lower extremity cellulitis.  Pt noted to have DM type 2.    If possible, please document in progress notes and discharge summary the   relationship, if any, between cellulitis and DM.    The medical record reflects the following:  Risk Factors: age, R ankle wound, Type 2 DM, Gout, peripheral neuropathy  Clinical Indicators: Patient resented with right foot pain, swelling, and   drainage. Surgical history to the joint.  Recently wounded with scissors   during dressing change creating open wound with red beefy base and drainage.   Blister under right great toe. On exam, Bilateral lower extremity redness,   swelling, induration, and petechiae   PN \"Right ankle wound/ cellulitis/ ? Acute osteomyelitis of distal   fibula. CT scan of the lower leg: Significant subcutaneous edema. Multiple   small fluid collections\"  Sed rate-76, CRP-4.3, BG-140>>>170  Treatment: daily labs, CT, wound conslt, orth consult, IV Cefepime, Flagyl,   Vanc, POC glucose checks    Thank you  Iram Lal RN [facility]  [phone]  Options provided:  -- R lower extremity cellulitis associated with Diabetes  -- R lower extremity cellulitis unrelated to Diabetes  -- Other - I will add my own diagnosis  -- Disagree - Not applicable / Not valid  -- Disagree - Clinically unable to determine / Unknown  -- Refer to Clinical Documentation Reviewer    PROVIDER RESPONSE TEXT:    R lower extremity cellulitis associated with Diabetes.    Query created by: Iram Lal on 2024 9:11 PM      Electronically signed by:  Sandra Gonzalez MD 2024 3:21 PM          
2/25/2024 at 1055 message sent to provider the patient has diagnosis of DM but he is not on achs check nor his he on medication did she want blood sugar check added  
Acknowledging order for PICC line for long term antibiotics.  Patient is awaiting a bed at [facility], spoke with ESTEPHANIA Henry, will follow for updates and plan to place prior to discharge.    Simba AMADOR  
[facility] Dosing Services: Antimicrobial Stewardship Daily Doc  Consult for antibiotic dosing of Vancomycin by Dr. Liz  Cefepime dose change per protocol  Indication: SSTI/possible osteo  Day of Therapy: 2/5    Ht Readings from Last 1 Encounters:   02/19/24 1.803 m (5' 11\")        Wt Readings from Last 1 Encounters:   02/19/24 90.7 kg (200 lb)      Vancomycin therapy:  Loading dose: Vancomycin 2250 mg x1 dose now/given  Maintenance dose: Vancomycin 750 mg IV every 12 hours   Dose calculated to approximate a           a. Target AUC/ROLAND of 400-600          b. Trough of 15-20 mcg/mL   Last level: Will order a level within 24-48hrs of 1st maintenance dose  Scr improved (0.96); WBC WNL; afebrile; PCT <0.05; UOP 0.6 mL/kg/hr  CT of ankle is concerning for possible osteo  Plan: Continue same. Level ordered with AM labs tomorrow. Scr every other day x 3 per protocol.   Dose administration notes: Doses given appropriately as scheduled    Non-Kinetic Antimicrobial Dosing Regimen:   Current Regimen:  Cefepime 2 gm IV q24hr  Recommendation: Adjust to cefepime 2 gm IV Q8H (increased for renal function and indication of possible osteo)  Dose administration notes: Doses given appropriately as scheduled    Other Antimicrobial   (not dosed by pharmacist) Metronidazole 500 mg IV Q8H   Cultures 2/20 Wound, abscess: pending  2/19 Wound, foot: pending   2/19: Blood: ngtd pending  2/19: Blood: ngtd pending   Serum Creatinine Lab Results   Component Value Date/Time    CREATININE 0.96 02/20/2024 12:50 AM      Creatinine Clearance Estimated Creatinine Clearance: 74 mL/min (based on SCr of 0.96 mg/dL).     Temp Temp: 98.1 °F (36.7 °C) (Oral)       WBC Lab Results   Component Value Date/Time    WBC 8.0 02/20/2024 12:50 AM      Procalcitonin N/A     For Antifungals, Metronidazole and Nafcillin: Lab Results   Component Value Date/Time    ALT 18 02/19/2024 05:00 PM    AST 18 02/19/2024 05:00 PM        Thank you,  Lamberto Soni, Pharm 
Comprehensive Nutrition Assessment    Type and Reason for Visit:  Initial, RD Nutrition Re-Screen/LOS    Nutrition Recommendations/Plan:   Continue regular diet  Provide Ensure High Protein once daily (160 kcal, 19 g carbs, 16 g protein)       Malnutrition Assessment:  Malnutrition Status:  No malnutrition (02/27/24 1052)    Context:  Acute Illness     Findings of the 6 clinical characteristics of malnutrition:  Energy Intake:  No significant decrease in energy intake  Weight Loss:  No significant weight loss     Body Fat Loss:  No significant body fat loss     Muscle Mass Loss:  No significant muscle mass loss    Fluid Accumulation:  Mild Extremities   Strength:  Not Performed    Nutrition Assessment:     Patient is a 78 year old male admitted with Cellulitis of right lower extremity [L03.115]  Ankle wound, left, initial encounter [S91.002A]  Acute deep vein thrombosis (DVT) of popliteal vein of right lower extremity (HCC) [I82.431]. He  has a past medical history of Chronic obstructive pulmonary disease (HCC), DVT (deep venous thrombosis) (HCC), Factor 5 Leiden mutation, heterozygous (HCC), Hypercholesteremia, Hypertension, Nonrheumatic aortic valve stenosis, Orthostatic hypotension, Peripheral neuropathy, and Pneumothorax.  RD screen for LOS. S/p RLE lateral hardware removal + R ankle abscess washout. Patient reports fine appetite with no chewing/swallowing problems. Known food allergy to raw apples, raw peaches, raw pears. No nausea/vomiting/diarrhea. Reports he last weighed himself in July 2023 and was \"around 200#\". Majority of weights in history appear stated; obtained bedscale weight this AM of 198#. If weight 2/13/24 accurate, patient has lost 9# (4.3%) x~2 weeks, clinically significant for timeframe. Discussed importance of protein for post-op healing, voiced acceptance of supplement 1x/day. Per patient he is discharging today vs tomorrow.     Meal Intake:   Patient Vitals for the past 168 hrs:   PO 
[facility] Dosing Services: Antimicrobial Stewardship Daily Doc  Consult for antibiotic dosing of Vancomycin by Dr. Liz  Cefepime dose change per protocol  Indication: SSTI/possible osteo  Day of Therapy: 6    Ht Readings from Last 1 Encounters:   02/19/24 1.803 m (5' 11\")        Wt Readings from Last 1 Encounters:   02/19/24 90.7 kg (200 lb)      Vancomycin therapy:  Loading dose: Vancomycin 2250 mg x1 dose given 2/19 @ 1915  Maintenance dose: Vancomycin 1250 mg IV every 12 hours (adjusted for level on 2/23)   Dose calculated to approximate a           a. Target AUC/ROLAND of 400-600          b. Trough of 15-20 mcg/mL   Last level: 2/23 Level obtained 7 hours post-dose was 14.3 mcg/mL. This predicted an AUC of 419 with only 61% per Bayesian kinetics calculation. Dose adjusted to current.   Scr 0.67 (stable); WBC WNL; afebrile; PCT <0.05; UOP 0.9 mL/kg/hr (improving); blood, wound, tissue cxs pending.   Ortho on board. CT of ankle shows multiple abscesses over fibula and is concerning for osteo. S/p R ankle hardware removal, I&D, and arthrocentesis on 2/22. Surgical cx and bone bx also obtained on 2/22.   Plan: Continue current regimen which predicts . Follow cultures. Renal panel ordered through Monday by provider. Order subsequent Vanc level in 6 days or sooner if clinically indicated.   Dose administration notes: Doses given appropriately as scheduled    Non-Kinetic Antimicrobial Dosing Regimen:   Current Regimen:  Cefepime 2 gm IV q8hr  Recommendation: Continue current for CrCl ~100  Dose administration notes: Doses given appropriately as scheduled    Other Antimicrobial   (not dosed by pharmacist) Metronidazole 500 mg IV Q8H   Cultures 2/22 Tissue, surgical - ngtd prelim  2/22 Wound, surgical - ngtd prelim  2/20 Wound, abscess: ngtd prelim  2/19 Wound, foot: mixed edgar FINAL  2/19: Blood: ngtd prelim  2/19: Blood: ngtd prelim   Serum Creatinine Lab Results   Component Value Date/Time    CREATININE 
[facility] Progress Note  Erick Clarke DO  [phone] Office  [phone] Fax    2024      Assessment & Plan:     Postoperative wound infection status post ORIF right lateral malleolus fracture 2023.  Taken back to the OR 2024 for I&D with hardware removal for septic arthritis. Cultures are sterile to date however he has been on multiple antibiotics prior to surgery.  Patient will need empiric 6-week course of IV antibiotics.  Plan discharge on daptomycin and ceftriaxone. (Low suspicion for Pseudomonas or anaerobes) IV antibiotic orders are in the chart   Dyslipidemia.  Hold rosuvastatin while on daptomycin  Diabetes mellitus.  Hemoglobin A1c 6.3  Aortic valve stenosis.  Status post TAVR 2023  History of sulfa and doxycycline allergies          Subjective:     No new complaints.  Tolerating antibiotics    Objective:     Vitals: BP (!) 143/89   Pulse (!) 103   Temp 99 °F (37.2 °C) (Oral)   Resp 16   Ht 1.803 m (5' 11\")   Wt 90 kg (198 lb 6.4 oz)   SpO2 93%   BMI 27.67 kg/m²      Tmax:  Temp (24hrs), Av.6 °F (37 °C), Min:98.1 °F (36.7 °C), Max:99 °F (37.2 °C)      Exam:   Patient is intubated:  no    Physical Examination:   General:  Alert, cooperative, no distress   Head:  Normocephalic, atraumatic.   Eyes:  Conjunctivae clear   Neck: Supple       Lungs:   No distress.     Chest wall:     Heart:     Abdomen:   non-distended   Extremities: Moves all.     Skin: No acute rash on exposed skin   Neurologic: CNII-XII intact. Normal strength     Labs:        Invalid input(s): \"ITNL\"   No results for input(s): \"CPK\", \"CKMB\" in the last 72 hours.    Invalid input(s): \"TROIQ\"  Recent Labs     24  0459      K 4.0      CO2 31   BUN 12   PHOS 3.1   MG 1.6   WBC 10.6   HGB 12.3   HCT 38.4          No results for input(s): \"INR\", \"APTT\" in the last 72 hours.    Invalid input(s): \"PTP\"  Needs: urine analysis, urine sodium, protein and 
[facility] Dosing Services: Antimicrobial Stewardship Daily Doc  Consult for antibiotic dosing of Vancomycin by Dr. Liz  Cefepime dose change per protocol  Indication: SSTI  Day of Therapy: 1/5    Ht Readings from Last 1 Encounters:   02/19/24 1.803 m (5' 11\")        Wt Readings from Last 1 Encounters:   02/19/24 90.7 kg (200 lb)      Vancomycin therapy:  Loading dose: Vancomycin 2250 mg x1 dose now/given  Maintenance dose: Vancomycin 750 mg IV every 12 hours   Dose calculated to approximate a           a. Target AUC/ROLAND of 400-600          b. Trough of 15-20 mcg/mL   Last level: Will order a level within 24-48hrs of 1st maintenance dose  Plan: Continue same  Dose administration notes: Doses given appropriately as scheduled    Non-Kinetic Antimicrobial Dosing Regimen:   Current Regimen:  Cefepime 2 gm IV q24hr  Recommendation: continue same  Dose administration notes: Doses given appropriately as scheduled    Other Antimicrobial   (not dosed by pharmacist) none   Cultures 2/19: Blood: pending  2/19: Blood: pending   Serum Creatinine Lab Results   Component Value Date/Time    CREATININE 1.20 02/19/2024 05:00 PM          Creatinine Clearance Estimated Creatinine Clearance: 59 mL/min (based on SCr of 1.2 mg/dL).     Temp Temp: 97.7 °F (36.5 °C) (Oral)       WBC Lab Results   Component Value Date/Time    WBC 8.6 02/19/2024 05:00 PM          Procalcitonin N/A     For Antifungals, Metronidazole and Nafcillin: Lab Results   Component Value Date/Time    ALT 18 02/19/2024 05:00 PM    AST 18 02/19/2024 05:00 PM        Pharmacist  Jose Manuel JeronimoD   [phone]     
[facility]O VALENC[facility]  [address] [place] [address] [phone]      Hospitalist Progress Note      NAME: Kingsley Wilson   :  1945  MRM:  [id number]    Date of service: 2024  12:16 PM       Assessment and Plan:   Right ankle wound/ cellulitis/ ? Acute osteomyelitis of distal fibula. CT scan of the lower leg: Significant subcutaneous edema. Multiple small fluid collections  surround the distal fibula and contact the plate. Formation and periostitis in the distal fibular shaft concerning for acute osteomyelitis. Evaluated by orthopedics. Cont vancomycin, cefepime and flagyl. Follow cultures. Pain management with IV dilaudid.      2.  Acute DVT of RT lower leg. Developed DVT while on xarelto. Hematology was consulted     3.  Type 2 diabetes-Recent A1c 6.3. not on medication. cover with SSI    4.  Gout. Continue allopurinol     5.  Peripheral neuropathy. Continue gabapentin, nortriptyline     6.  Hypertension. On metoprolol     7.  Hyperlipidemia. Cont crestor      8.  Hx of factor V Leiden. While taking xarelto, pt developed DVT. Hematology consulted      9.  Aortic valve stenosis. Hx of TAVR and 2023. Stable.           Subjective:     Chief Complaint:: Patient was seen and examined as a follow up for RT ankle wound.  Chart was reviewed. c/o leg pain     ROS:  (bold if positive, if negative)    Tolerating PT  Tolerating Diet        Objective:     Last 24hrs VS reviewed since prior progress note. Most recent are:    Vitals:    24 1103   BP: 124/64   Pulse: 88   Resp: 19   Temp: 98.1 °F (36.7 °C)   SpO2: 93%     SpO2 Readings from Last 6 Encounters:   24 93%   23 94%   23 96%          Intake/Output Summary (Last 24 hours) at 2024 1216  Last data filed at 2024 0513  Gross per 24 hour   Intake 240 ml   Output 600 ml   Net -360 ml        Physical Exam:    Gen:  Well-developed, well-nourished, in no acute distress  HEENT:  Pink 
MACO VALENCIA [facility]  [address] [place] [address] [phone]      Hospitalist Progress Note      NAME: Kingsley Wilson   :  1945  MRM:  [id number]    Date of service: 2024  10:13 AM       Assessment and Plan:   Right ankle cellulitis with abscess, septic arthritis, acute osteomyelitis of distal fibula: CT lower leg: Significant subcutaneous edema. Multiple small fluid collections surround the distal fibula and contact the plate. Formation and periostitis in the distal fibular shaft concerning for acute osteomyelitis. Evaluated by orthopedics. - washout and hardware removal 24.  Evaluated by ID and antibiotics was changed to daptomycin and ceftriaxone.  Cultures are NGTD.  Continue antibiotics through 4/3/24.     2.  Acute DVT of RT lower leg. Developed DVT while on xarelto.  Evaluated by hematology and recommended to Eliquis.  Eliquis loading dose and maintenance.    3.  Type 2 diabetes. Recent A1c 6.3. not on medication. cover with SSI     4.  Gout. Continue allopurinol     5.  Peripheral neuropathy. Continue gabapentin, nortriptyline     6.  Hypertension.  On metoprolol and amlodipine    7.  Hyperlipidemia. HOLD Crestor while on Daptomycin     8.  Hx of factor V Leiden.  Evaluated by hematology.  Recommended to start Eliquis    9.  Aortic valve stenosis. Hx of TAVR and 2023 with Dr. Leiva at [facility].              Subjective:     Chief Complaint:: Patient was seen and examined as a follow up for right ankle cellulitis and abscess.  Chart was reviewed.  Feels well.  Denies pain    ROS:  (bold if positive, if negative)    Tolerating PT  Tolerating Diet        Objective:     Last 24hrs VS reviewed since prior progress note. Most recent are:    Vitals:    24 0802   BP: (!) 143/89   Pulse: (!) 103   Resp: 16   Temp: 99 °F (37.2 °C)   SpO2: 93%     SpO2 Readings from Last 6 Encounters:   24 93%   23 94%   23 96%      
Occupational Therapy Note  2/28/2024    OT treatment attempted at 1306 however patient politely requesting to rest at this time as patient awaiting discharge to rehab today.    Thank you,  Yudi Machuca OTR/L    
Ortho Note    Subjective:    Kingsley Wilson is a 78 y.o. 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    Major Events:.    Pain controlled, hemodynamically stable, on IV abx    Objective:    Vital signs in last 24 hours:    [unfilled]    Temp (24hrs), Av.3 °F (36.8 °C), Min:97.9 °F (36.6 °C), Max:98.8 °F (37.1 °C)      Labs:    Lab Results   Component Value Date/Time    WBC 9.4 2024 03:07 AM    HGB 12.2 2024 03:07 AM    HCT 38.1 2024 03:07 AM     2024 03:07 AM       Lab Results   Component Value Date/Time     2024 03:07 AM    K 3.7 2024 03:07 AM     2024 03:07 AM    CO2 30 2024 03:07 AM    BUN 13 2024 03:07 AM    CREATININE 0.73 2024 03:07 AM    GLUCOSE 108 2024 03:07 AM    GLUCOSE 101 2024 02:34 PM       Physical Exam:    General: alert, cooperative, in NAD  Nonlabored resp    RIGHT Lower extremity    Dressing: c/d/      Motor: + toe df/pf    Sensory: diminished sensation throughout foot/ankle at baseline    Vascular: Brisk capillary refill in toes    Assessment/Plan:    79 yo M w/ RIGHT ankle abscess and suspected RIGHT ankle and subtalar septic arthritis and DVT.    NPO   Cont IV abx  Bedrest  Plan OR today for washout of RIGHT ankle abscess, right ankle and subtalar joints and BEN RIGHT lateral malleolus    Silverio Martinez MD    
Orthopedic NP Progress Note  Post Op Day: 3 Days Post-Op    February 25, 2024 11:49 AM     Kingsley Wilson    Attending Physician: Treatment Team: Attending Provider: Sandra Gonzalez MD; Consulting Physician: Luiz Sims PA; Consulting Provider: Luiz Sims PA; Orthopedic Surgeon: Silverio Martinez MD; Consulting Physician: Schoeneweis, Ann C, APRN - NP; Utilization Reviewer: Celeste Redd RN; Surgeon: Silverio Martinez MD; : Carl Lee; Patient Care Tech: Suri Oquendo; Nursing Instructor: Joanne Molina; LPN: Sherry Almanza LPN; Occupational Therapist: Keren Durham OT; Nursing Student: Raeann Cornejo     Vital Signs:    Patient Vitals for the past 8 hrs:   BP Temp Temp src Pulse Resp SpO2   02/25/24 1145 135/71 98.6 °F (37 °C) Oral 70 16 94 %   02/25/24 0815 (!) 143/76 97.7 °F (36.5 °C) Oral 90 18 94 %   02/25/24 0745 (!) 166/93 -- -- -- -- --   02/25/24 0607 (!) 177/86 -- -- 87 -- --   02/25/24 0556 -- -- -- -- 17 --   02/25/24 0531 (!) 180/86 -- -- 88 -- --   02/25/24 0526 -- -- -- -- 18 --   02/25/24 0518 (!) 170/90 -- -- -- -- --   02/25/24 0443 (!) 163/98 -- -- 83 -- 93 %            Intake/Output:  02/25 0701 - 02/25 1900  In: 240 [P.O.:240]  Out: -   02/23 1901 - 02/25 0700  In: 4594 [P.O.:700; I.V.:1875.2]  Out: 5650 [Urine:5650]    Pain Control:        LAB:    Recent Labs     02/25/24  0230   HCT 36.0*   HGB 11.5*       Lab Results   Component Value Date/Time     02/25/2024 02:30 AM    K 3.6 02/25/2024 02:30 AM     02/25/2024 02:30 AM    CO2 30 02/25/2024 02:30 AM    BUN 11 02/25/2024 02:30 AM       Subjective:  Kingsley Wilson is a 78 y.o. male s/p a  Procedure(s):  REMOVAL HARDWARE RIGHT LATERAL LOWER EXTREMITY, WASHOUT RIGHT ANKLE ABCESS   Procedure(s):  REMOVAL HARDWARE RIGHT LATERAL LOWER EXTREMITY, WASHOUT RIGHT ANKLE ABCESS. Tolerating diet. Dressing was changed last PM, No pain at this time, no c/os, pt up in chair and did 
Orthopedics is aware of the patient.  Patient with right ankle cellulitis and draining wound after returning from [place].  CT of right ankle with contrast ordered to evaluate for an abscess.  If there is no abscess then we will treat the ankle cellulitis with IV antibiotics.  nIf there is an abscess plan for I and D this week.  Full note to follow.      Luiz Sims PA-C  Orthopaedic Surgery PA  [facility] [place]  [facility]      
PHYSICAL THERAPY:Pt receiving a pick line placement at this time.PT will continue to follow.  
PICC Placement Note    PRE-PROCEDURE VERIFICATION  Correct Procedure: yes  Correct Site:  yes  Temperature: Temp: 99 °F (37.2 °C)   Recent Labs     02/26/24  0459   BUN 12      WBC 10.6     Allergies: Atorvastatin, Prunus persica, Simvastatin, Sulfa antibiotics, and Doxycycline  Education materials for PICC Care given: yes. See Patient Education activity for further details.  PICC Booklet placed at bedside: yes    Closed Ended PICC Catheters:  Flush Lumens as Follows:  Intermittent Medication:   Flush before and after each medication with 10 ml NS.   Unused Ports:  Flush every 8 hours with 10 ml NS.  TPN Ports:  Flush every 24 hours with 20 ml NS prior to hanging new bag.  Blood Draws: Stop infusion, draw off and waste 10 ml of blood. Draw sample with 10cc syringe or greater. DO NOT USE VACUTAINER . Transfer with appropriate device to lab  tubes. Flush with 20 ml NS.  Dressing Change:  Every 7 days, and PRN using sterile technique if integrity of dressing is compromised.  Initial dressing change for central line 24-48 hours post insertion if gauze is used. Apply new dressing per policy.    PROCEDURE DETAIL  Consent was obtained and all questions were answered related to risks and benefits.   A double lumen PICC line was inserted, as a sterile procedure using ultrasound and modified Seldinger technique for antibiotic therapy. The following documentation is in addition to the PICC properties in the lines/airways flowsheet :  Lot #: [id number]  Lidocaine 1% administered intradermally :yes  Internal Catheter Total Length: 38 (cm) 0 cm out arm cir 34 cm   Vein Selection for PICC:right basilic  Central Line Bundle followed yes  Complication Related to Insertion:no    The placement was verified by EKG, MAX P WAVE @ 38 (cm). PER EKG PICC TIP @ C/A junction.       Line is okay to use: yes    Susan Sweeney, PERRY    
Patient positive for DVT ( study performed 2/20/2024). This is a contraindication for ALEXANDER.  The ALEXANDER order will be canceled. Awaiting  further advice from ordering provider. Information explained to Wanda AMADOR  
Pharmacy Dosing Services:  2/19/24    Pharmacist Renal Dosing  Note for cefepime   Physician Dr. Liz    Indication: SSTI  Previous Regimen Cefepime 2g q8h   Serum Creatinine Lab Results   Component Value Date/Time    CREATININE 1.20 02/19/2024 05:00 PM      Creatinine Clearance Estimated Creatinine Clearance: 59 mL/min (based on SCr of 1.2 mg/dL).   BUN Lab Results   Component Value Date/Time    BUN 26 02/19/2024 05:00 PM         Plan:  adjusts to cefepime 2g q24h per protocol    Arabella Machuca, PharmD, BCPS  
Pharmacy Dosing Services:  2/26/24    The pharmacist has determined that this patient meets P & T approved criteria for conversion from IV to oral therapy for the following medication: Metronidazole      The pharmacist has written the following order for the patient: 500mg q8h po  The pharmacist will continue to monitor the patient's status and advise the physician if conversion back to IV therapy is recommended.    Signed Paola Machuca RPH Contact information: [phone]    
Post Discharge PICC and Antibiotic Orders    1.  Diagnosis: Postoperative wound infection status post ORIF ankle fracture.  No culture data  2.  Antibiotic: Daptomycin 700 mg IV daily through 4/3/2024                        Ceftriaxone 2 g IV daily through 4/3/2024                         Hold statin while on daptomycin  3.  Routine PICC/ Leona/ Portacath Care including PRN catheter flow management  4.  Weekly labs:   [x] CBC/diff/platelets   [x] BUN/Creatinine   [x] CPK   [x] AST/TotalBilirubin/AlkalinePhosphatase   [x] CRP   []Trough Vancomycin level goal 15-20  5.  Fax lab to [phone]  Call Critical Lab Values to [phone]  6.  May send to IR for line evaluation or replacement [phone]  7.  Home Health to pull PIC line at end of therapy or send to IR for Leona removal  8.  Allergies:    Allergies   Allergen Reactions    Atorvastatin Other (See Comments)     Elevated LFT's.  (Crestor OK).      Prunus Persica Itching     Apples as well,pears, peaches fresh causes throat swelling , reddened gums , able to eat cooked     Simvastatin Other (See Comments)     Elevated LFT's.  (Crestor OK).      Sulfa Antibiotics Other (See Comments)     Hallucinations as a child    Doxycycline Rash           Erick Clarke DO     
Pt refusing bed alarm, extremely adamant. Educated on importance for his safety, especially with dressing to foot, continues to refuse. Primary RN notified.  
[facility] Dosing Services: Antimicrobial Stewardship Daily Doc  Consult for antibiotic dosing of Vancomycin by Dr. Liz  Cefepime dose change per protocol  Indication: SSTI/possible osteo  Day of Therapy: 3    Ht Readings from Last 1 Encounters:   02/19/24 1.803 m (5' 11\")        Wt Readings from Last 1 Encounters:   02/19/24 90.7 kg (200 lb)      Vancomycin therapy:  Loading dose: Vancomycin 2250 mg x1 dose now/given  Maintenance dose: Vancomycin 750 mg IV every 12 hours   Dose calculated to approximate a           a. Target AUC/ROLAND of 400-600          b. Trough of 15-20 mcg/mL   Last level: 2/21 Level obtained 4 hours post-dose was 16.9 mcg/mL. This predicts an AUC of <400 per Bayesian kinetics calculation and is SUBtherapeutic.  Scr improved (0.8); WBC WNL; afebrile; PCT <0.05; UOP 0.9 mL/kg/hr  Ortho on board. CT of ankle shows multiple abscesses over fibula and is concerning for osteo. Plan for R ankle hardware removal, I&D, and arthrocentesis as well as likely surgical cx on Friday. Given possible osteo, will need to dose more aggressively and extend duration beyond 5 days per the initial consult.   Plan: Adjust vancomycin to 1000 mg IV Q12H which predicts  per Bayesian kinetics calculations. Scr every other day x 3 per protocol.   Dose administration notes: Doses given appropriately as scheduled    Non-Kinetic Antimicrobial Dosing Regimen:   Current Regimen:  Cefepime 2 gm IV q8hr  Recommendation: Continue current  Dose administration notes: Doses given appropriately as scheduled    Other Antimicrobial   (not dosed by pharmacist) Metronidazole 500 mg IV Q8H   Cultures 2/20 Wound, abscess: ngtd pending  2/19 Wound, foot: ngtd pending   2/19: Blood: ngtd pending  2/19: Blood: ngtd pending   Serum Creatinine Lab Results   Component Value Date/Time    CREATININE 0.80 02/21/2024 12:33 AM      Creatinine Clearance Estimated Creatinine Clearance: 89 mL/min (based on SCr of 0.8 mg/dL).     Temp Temp: 
Spiritual Care Assessment/Progress Note  [facility]    Name: Kingsley Wilson MRN: [id number]    Age: 78 y.o.     Sex: male   Language: English     Date: 2/23/2024            Total Time Calculated: 19 min              Spiritual Assessment begun in [facility] B5 MULTI-SPECIALTY ONCOLOGY 1  Service Provided For:: Patient, Family  Referral/Consult From:: Rounding  Encounter Overview/Reason : Initial Encounter    Spiritual beliefs:      [x] Involved in a natalie tradition/spiritual practice: Yarsani      [x] Supported by a natalie community: [facility] in [facility]     [] Claims no spiritual orientation:      [] Seeking spiritual identity:           [] Adheres to an individual form of spirituality:      [] Not able to assess:                Identified resources for coping and support system:   Support System: Spouse       [x] Prayer                  [] Devotional reading               [] Music                  [] Guided Imagery     [] Pet visits                                        [] Other: (COMMENT)     Specific area/focus of visit   Encounter:    Crisis:    Spiritual/Emotional needs: Type: Spiritual Support  Ritual, Rites and Sacraments: Type:  (Prayer)  Grief, Loss, and Adjustments:    Ethics/Mediation:    Behavioral Health:    Palliative Care:    Advance Care Planning:      Plan/Referrals: Other (Comment) (Contact [facility] health for further assistance needed.)    Narrative: Chart review. I rounded on Med Surg and provided visitation to patient Kingsley Wilson. Patient prefers to be called Mac. Patient's spouse was present at bedside. Patient's spouse name is La Nena Wilson. Her contact number is [phone]. Patient is of the Yarsani denomination. Patient and spouse attends [facility] in [facility]. Patient communicated that they have not been back to [place] since Covid times. Patient's support system entails his wife and the patient's sister who 
Vancomycin level was 14.3 mcg/ml. This predicts an AUC of 419 with only 61% probability. Will increase dosing to 1250 mg IV q12H, which predicts an AUC of 515 with 94% probability.  
Venous duplex lower extremities test completed  
Wound care to R foot performed; patient tolerated very well, no pain medication needed.  
    Intake/Output Summary (Last 24 hours) at 2/27/2024 1055  Last data filed at 2/27/2024 0936  Gross per 24 hour   Intake 2428.17 ml   Output 775 ml   Net 1653.17 ml        Physical Exam:    Gen:  Well-developed, well-nourished, in no acute distress  HEENT:  Pink conjunctivae, PERRL, hearing intact to voice, moist mucous membranes  Neck:  Supple, without masses, thyroid non-tender  Resp:  No accessory muscle use, clear breath sounds without wheezes rales or rhonchi  Card:  No murmurs, normal S1, S2 without thrills, bruits or peripheral edema  Abd:  Soft, non-tender, non-distended, normoactive bowel sounds are present, no palpable organomegaly and no detectable hernias  Lymph:  No cervical or inguinal adenopathy  Musc:  No cyanosis or clubbing  Skin:  No rashes or ulcers, skin turgor is good  Neuro:  Cranial nerves are grossly intact, no focal motor weakness, follows commands appropriately  Psych:  Good insight, oriented to person, place and time, alert  __________________________________________________________________  Medications Reviewed: (see below)  Medications:     Current Facility-Administered Medications   Medication Dose Route Frequency    amLODIPine (NORVASC) tablet 10 mg  10 mg Oral Daily    nortriptyline (PAMELOR) capsule 50 mg  50 mg Oral Nightly    DAPTOmycin (CUBICIN) 725 mg in sodium chloride (PF) 0.9 % 14.5 mL IV syringe  8 mg/kg IntraVENous Q24H    cefTRIAXone (ROCEPHIN) 2,000 mg in sterile water 20 mL IV syringe  2,000 mg IntraVENous Q24H    lactobacillus (CULTURELLE) capsule 1 capsule  1 capsule Oral Daily with breakfast    enoxaparin (LOVENOX) injection 90 mg  1 mg/kg SubCUTAneous BID    melatonin tablet 6 mg  6 mg Oral Nightly PRN    [Held by provider] rosuvastatin (CRESTOR) tablet 10 mg  10 mg Oral Nightly    sodium chloride flush 0.9 % injection 5-40 mL  5-40 mL IntraVENous 2 times per day    sodium chloride flush 0.9 % injection 5-40 mL  5-40 mL IntraVENous PRN    0.9 % sodium chloride 
LATERAL LOWER EXTREMITY, WASHOUT RIGHT ANKLE ABCESS    Principal Problem:    Ankle wound, left, initial encounter  Active Problems:    Cellulitis of right lower extremity  Resolved Problems:    * No resolved hospital problems. *       Plan:   -  S/P I&D of R Ankle with Hardware Removal, R Ankle Septic Arthritis - continue daily wound care as per orders (RN will complete today), continue Vancomycin and Cefepime as per orders with final cultures pending, prelim cultures no growth thus far with plan to hold for 14 days, CRP trending down (1.24 today), repeatr ESR pending  - NWB RLE    Discharge Planning: planning for home with HH        Signed By: ELIAS Singer - NP    Orthopedic Nurse Practitioner    
\"PTP\"  Needs: urine analysis, urine sodium, protein and creatinine  No results found for: \"MARIS\", \"KU\", \"CREAU\"      Cultures:     No results found for: \"SDES\"  No components found for: \"CULT\"    Radiology:     Medications       [facility]        Case discussed with:      Erick Clarke DO              
events were also right popliteal in location. This may not be a rivaroxaban failure.  However, it is impossible to know with certainty.  The be on the safe side, I recommend we switch therapy.  Start therapeutic lovenox in the hospital.  On discharge, change to Apixaban 10mg PO BID for 7 days, then 5mg PO BID.  Discussed alternative option of coumadin, given concern for DOAC failure, but he does not want to take coumadin again.    He should follow up with his hematologist at [facility] on discharge.      Rt ankle wounds / abscesses  Continue abx. Wound care following. Orthopedics following, planning surgery.      DM2, HTN  Management per primary team         I will sign off at this time.  He can follow up with his regular hematologist on discharge.  Please call with any questions.    Signed By: Micha Forde MD          
pulses  Neuro/Psych: pleasant mood and affect, CN 2-12 grossly intact, sensory grossly within normal limit, Moves all extremities,  Skin: warm            Data Review:    Review and/or order of clinical lab test  Review and/or order of tests in the radiology section of CPT  Review and/or order of tests in the medicine section of CPT  Discussion of test results with performing physician    I have independently reviewed and interpreted patient's lab and all other diagnostic data    Notes reviewed from all clinical/nonclinical/nursing services involved in patient's clinical care. Care coordination discussions were held with appropriate clinical/nonclinical/ nursing providers based on care coordination needs.     Radiology: Radiology Reads Reviewed    Labs:     Recent Labs     02/25/24  0230 02/26/24  0459   WBC 9.5 10.6   HGB 11.5* 12.3   HCT 36.0* 38.4    295       Recent Labs     02/24/24  0204 02/25/24  0230 02/26/24 0459    138 141   K 4.0 3.6 4.0   * 105 106   CO2 28 30 31   BUN 15 11 12   MG 1.6 1.6 1.6   PHOS 2.4* 2.6 3.1       No results for input(s): \"ALT\", \"TP\", \"ALB\", \"GLOB\", \"GGT\", \"AML\" in the last 72 hours.    Invalid input(s): \"SGOT\", \"GPT\", \"AP\", \"TBIL\", \"TBILI\", \"AMYP\", \"LPSE\", \"HLPSE\"    No results for input(s): \"INR\", \"APTT\" in the last 72 hours.    Invalid input(s): \"PTP\"   No results for input(s): \"TIBC\", \"FERR\" in the last 72 hours.    Invalid input(s): \"FE\", \"PSAT\"   No results found for: \"FOL\", \"RBCF\"   No results for input(s): \"PH\", \"PCO2\", \"PO2\" in the last 72 hours.  No results for input(s): \"CPK\" in the last 72 hours.    Invalid input(s): \"CPKMB\", \"CKNDX\", \"TROIQ\"  Lab Results   Component Value Date/Time    CHOL 119 09/12/2023 11:39 AM    CHOL 163 03/21/2023 09:32 AM    HDL 33 09/12/2023 11:39 AM     No results found for: \"GLUCPOC\"      Medications Reviewed:     Current Facility-Administered Medications   Medication Dose Route Frequency    DAPTOmycin (CUBICIN) 725 mg in 
test  Review and/or order of tests in the radiology section of CPT  Review and/or order of tests in the medicine section of CPT  Discussion of test results with performing physician    I have independently reviewed and interpreted patient's lab and all other diagnostic data    Notes reviewed from all clinical/nonclinical/nursing services involved in patient's clinical care. Care coordination discussions were held with appropriate clinical/nonclinical/ nursing providers based on care coordination needs.     Radiology: Radiology Reads Reviewed    Labs:     Recent Labs     02/19/24  1700 02/20/24  0050   WBC 8.6 8.0   HGB 13.0 11.3*   HCT 40.3 35.2*    323     Recent Labs     02/19/24  1700 02/20/24  0050    139   K 4.6 4.6    109*   CO2 30 29   BUN 26* 23*     Recent Labs     02/19/24  1700   ALT 18   GLOB 5.1*     No results for input(s): \"INR\", \"APTT\" in the last 72 hours.    Invalid input(s): \"PTP\"   No results for input(s): \"TIBC\", \"FERR\" in the last 72 hours.    Invalid input(s): \"FE\", \"PSAT\"   No results found for: \"FOL\", \"RBCF\"   No results for input(s): \"PH\", \"PCO2\", \"PO2\" in the last 72 hours.  No results for input(s): \"CPK\" in the last 72 hours.    Invalid input(s): \"CPKMB\", \"CKNDX\", \"TROIQ\"  Lab Results   Component Value Date/Time    CHOL 119 09/12/2023 11:39 AM    CHOL 163 03/21/2023 09:32 AM    HDL 33 09/12/2023 11:39 AM     No results found for: \"GLUCPOC\"      Medications Reviewed:     Current Facility-Administered Medications   Medication Dose Route Frequency    enoxaparin (LOVENOX) injection 90 mg  1 mg/kg SubCUTAneous BID    vancomycin (VANCOCIN) 1,000 mg in sodium chloride 0.9 % 250 mL IVPB ([id number])  1,000 mg IntraVENous Q12H    amLODIPine (NORVASC) tablet 5 mg  5 mg Oral Daily    melatonin tablet 6 mg  6 mg Oral Nightly PRN    metroNIDAZOLE (FLAGYL) 500 mg in 0.9% NaCl 100 mL IVPB premix  500 mg IntraVENous Q8H    rosuvastatin (CRESTOR) tablet 10 mg  10 mg Oral Nightly    
Medications   Medication Dose Route Frequency    vancomycin (VANCOCIN) 1,250 mg in sodium chloride 0.9 % 250 mL IVPB ([id number])  1,250 mg IntraVENous Q12H    lactated ringers IV soln infusion   IntraVENous Continuous    lactobacillus (CULTURELLE) capsule 1 capsule  1 capsule Oral Daily with breakfast    enoxaparin (LOVENOX) injection 90 mg  1 mg/kg SubCUTAneous BID    amLODIPine (NORVASC) tablet 5 mg  5 mg Oral Daily    melatonin tablet 6 mg  6 mg Oral Nightly PRN    metroNIDAZOLE (FLAGYL) 500 mg in 0.9% NaCl 100 mL IVPB premix  500 mg IntraVENous Q8H    rosuvastatin (CRESTOR) tablet 10 mg  10 mg Oral Nightly    ceFEPIme (MAXIPIME) 2,000 mg in sodium chloride 0.9 % 100 mL IVPB (mini-bag)  2,000 mg IntraVENous Q8H    sodium chloride flush 0.9 % injection 5-40 mL  5-40 mL IntraVENous 2 times per day    sodium chloride flush 0.9 % injection 5-40 mL  5-40 mL IntraVENous PRN    0.9 % sodium chloride infusion   IntraVENous PRN    ondansetron (ZOFRAN-ODT) disintegrating tablet 4 mg  4 mg Oral Q8H PRN    Or    ondansetron (ZOFRAN) injection 4 mg  4 mg IntraVENous Q6H PRN    polyethylene glycol (GLYCOLAX) packet 17 g  17 g Oral Daily PRN    acetaminophen (TYLENOL) tablet 650 mg  650 mg Oral Q6H PRN    Or    acetaminophen (TYLENOL) suppository 650 mg  650 mg Rectal Q6H PRN    HYDROmorphone HCl PF (DILAUDID) injection 1 mg  1 mg IntraVENous Q4H PRN    allopurinol (ZYLOPRIM) tablet 100 mg  100 mg Oral Daily    aspirin EC tablet 81 mg  81 mg Oral Daily    gabapentin (NEURONTIN) capsule 1,200 mg  1,200 mg Oral BID    metoprolol succinate (TOPROL XL) extended release tablet 50 mg  50 mg Oral Daily     ______________________________________________________________________  ACTUAL LENGTH OF STAY:          4                 Sandra Gonzalez MD

## 2024-02-28 NOTE — DISCHARGE INSTRUCTIONS
ACUTE DIAGNOSES:  Cellulitis of right lower extremity [L03.115]  Ankle wound, left, initial encounter [S91.002A]  Acute deep vein thrombosis (DVT) of popliteal vein of right lower extremity (HCC) [I82.431]    CHRONIC MEDICAL DIAGNOSES:  [unfilled]    DISCHARGE MEDICATIONS:   [unfilled]    It is important that you take the medication exactly as they are prescribed.   Keep your medication in the bottles provided by the pharmacist and keep a list of the medication names, dosages, and times to be taken in your wallet.   Do not take other medications without consulting your doctor.       DIET:  regular diet    ACTIVITY: activity as tolerated    ADDITIONAL INFORMATION: If you experience any of the following symptoms then please call your primary care physician or return to the emergency room if you cannot get hold of your doctor: Fever, chills, nausea, vomiting, diarrhea, change in mentation, falling, bleeding, shortness of breath.    FOLLOW UP CARE:   @PCP@  you are to call and set up an appointment to see them in 5 days.    Follow-up  [facility]  [address] [place] [address] [phone]  Follow up  Room-2018        Information obtained by :  I understand that if any problems occur once I am at home I am to contact my physician.    I understand and acknowledge receipt of the instructions indicated above.                                                                                                                                           Physician's or R.N.'s Signature                                                                  Date/Time                                                                                                                                              Patient or Representative Signature                                                          Date/Time        Silverio Martinez MD  Foot and Ankle Surgery  General

## 2024-02-28 NOTE — CARE COORDINATION
02/23/24 1633   Service Assessment   Patient Orientation Alert and Oriented;Person;Place;Situation;Self   Cognition Alert   History Provided By Significant Other   Primary Caregiver Self   Support Systems Spouse/Significant Other;Children   Patient's Healthcare Decision Maker is: Legal Next of Kin   PCP Verified by CM Yes   Last Visit to PCP Within last 3 months   Prior Functional Level Independent in ADLs/IADLs   Current Functional Level Independent in ADLs/IADLs   Can patient return to prior living arrangement Yes   Ability to make needs known: Good   Family able to assist with home care needs: Yes   Would you like for me to discuss the discharge plan with any other family members/significant others, and if so, who? Yes  (Wilson,La Nena (Spouse) [phone])   Social/Functional History   Lives With Spouse   Type of Home House   ADL Assistance Independent   Homemaking Assistance Independent   Mode of Transportation Car     Per review of chart and discussion with spouse. Spouse prefers to take Pt home with home health services. Pt's wife requested that HH would be arranged with [facility].     CM will continue to follow for discharge planning needs.     KASIE Mcallister, CM  [facility] Care Manager  [phone]    
  Transition of Care Plan:  -RUR: 11%  -Disposition:[facility] pending bed availability  Family is open to [facility] and [facility] if [facility] does not have availability.  HH preference is: [facility]  Will need PICC placed prior to d/c  -Transportation at Discharge: Family requested Hospital to Home    -IM Medicare Letter: Provided on 2/27/24  -Caregiver Contact: La Nena Wilson (Spouse)  [phone]            12:11pm: CM received a phone call from [facility]. They do not have a bed available today.       11:40am: CM contacted [facility], and [facility] to follow-up on bed availability. [facility] currently does not have a bed available.CM awaiting a response from [facility] regarding bed availability.         KASIE Mcallister, [facility] Care Manager  [phone]    
@1100 Case management follow up  Met with patient and spouse at bedside, discussed level of care options.  SNF vs IPR vs HH.    Patient resides with spouse in a multi level home with 4 steps to enter.  Bedroom is on the 2nd floor but patient has been staying in his office on the 1st floor and sleeping in a recliner for some time.   DME in home: cane, walker, commode, WC, shower bench, grab bars, knee scooter.    Son and daughter live near by and can provide support if needed.     Spouse a bit concerned of patient returning home with level of care needed ( IV abx ).  Patient/spouse requested referrals sent to [facility], [facility] and [facility].  Referrals sent via [employer].   HH agency of choice if IPR denial- [facility] or [facility].  SNF list left for review, however family is leaning towards IPR vs HH.  No real interest of a transition to SNF at this time.     Discussed transportation and OOP cost, patient used [facility] in the past and understands there will be a cost.  Patient remains NWB to RLE, would need support to enter home/facility at discharge.     Per provider, ID will be consulted for IV abx at discharge.  PICC placement needed.       
Per chart review, [facility] has approved Pt for IPR. [facility] is currently pending review. CM will continue to follow for discharge planning needs.     KASIE Mcallister, [facility] Manager  [phone]    
  Treatment:  []Isolation (for MRSA, VRE, etc.)  []Surgical Drain Management  []Tracheostomy Care  []Dressing Changes  []Dialysis with transportation  []PEG Care  []Oxygen  []Daily Weights for Heart Failure    Dietary:  []Any diet limitations  []Tube Feedings   []Total Parenteral Management (TPN)    Financial Resources:  []Medicaid Application Completed    []UAI Completed and copy given to pt/family  and copy given to pt/family  []A screening has previously been completed.    []Level II Completed    [] Private pay individual who will not become   financially eligible for Medicaid within 6 months from admission to a [facility].     [] Individual refused to have screening conducted.     []Medicaid Application Completed    []The screening denied because it was determined individual did not need/did not qualify for nursing facility level of care.  [] Out of state residents seeking direct admission to a VA nursing facility.  [] Individuals who are inpatients of an out of state hospital, or in state or out of state veterans/ hospital and seek direct admission to a VA nursing facility  [] Individuals who are pateints or residents of a state owned/operated facility that is licensed by Department of Behavioral Services (DBHDS) and seek direct admission to VA nursing facility  [] A screening not required for enrollment in Medicaid Hospice services as set out in 12 VAC 30-  [] [facility] ([facility]) staff shall perform screenings of the [facility] clients.    Advanced Care Plan:  []Surrogate Decision Maker of Care  []POA  []Communicated Code Status and copy sent.    Other:              02/28/24 1143   Services At/After Discharge   Mode of Transport at Discharge [facility]   Condition of Participation: Discharge Planning   The Plan for Transition of Care is related to the following treatment goals: ankle wound   The Patient and/or Patient Representative was provided with a Choice of Provider?

## 2024-02-28 NOTE — WOUND CARE
Wound consult for red buttocks- patient sitting in chair and states there is no problem and refuses at this time to be assessed  Family states he is going to [facility] today and they will assess when he arrives.  Will follow if patient not discharged today  Azul Peter Rn  Wound

## 2024-02-28 NOTE — DISCHARGE SUMMARY
SBAR report given to Gabbie Montanez at [facility]. Transport to  patient around 1500 today.  
     sublingual tablet cyanocobalamin 2500 MCG SUBL Take 1 tablet by mouth daily      metoprolol succinate (TOPROL XL) 50 MG extended release tablet Take 1 tablet by mouth daily           STOP taking these medications       cephALEXin (KEFLEX) 500 MG capsule Comments:   Reason for Stopping:         furosemide (LASIX) 40 MG tablet Comments:   Reason for Stopping:         midodrine (PROAMATINE) 2.5 MG tablet Comments:   Reason for Stopping:         rivaroxaban (XARELTO) 20 MG TABS tablet Comments:   Reason for Stopping:         colchicine (COLCRYS) 0.6 MG tablet Comments:   Reason for Stopping:         potassium chloride (KLOR-CON M10) 10 MEQ extended release tablet Comments:   Reason for Stopping:         vitamin B-1 (THIAMINE) 100 MG tablet Comments:   Reason for Stopping:               Follow up Care:    1. [facility]  [address] [place] [address] [phone]  Follow up  Room-2018   in 1-2 weeks  2. Ortho     Diet:  regular diet    Disposition:  Rehab.    Advanced Directive:    Discharge Exam:  See today's note.    CONSULTATIONS: ID, hematology/oncology, and orthopedic surgery    Significant Diagnostic Studies:   Recent Labs     02/26/24  0459   WBC 10.6   HGB 12.3   HCT 38.4        Recent Labs     02/26/24  0459      K 4.0      CO2 31   BUN 12   MG 1.6   PHOS 3.1     No results for input(s): \"ALT\", \"TP\", \"ALB\", \"GLOB\", \"GGT\", \"AML\" in the last 72 hours.    Invalid input(s): \"SGOT\", \"GPT\", \"AP\", \"TBIL\", \"TBILI\", \"AMYP\", \"LPSE\", \"HLPSE\"  No results for input(s): \"INR\", \"APTT\" in the last 72 hours.    Invalid input(s): \"PTP\"   No results for input(s): \"TIBC\", \"FERR\" in the last 72 hours.    Invalid input(s): \"FE\", \"PSAT\"   No results for input(s): \"PH\", \"PCO2\", \"PO2\" in the last 72 hours.  No results for input(s): \"CPK\", \"CKMB\", \"TROPONINI\" in the last 72 hours.  No results found for: \"GLUCPOC\"          HOSPITAL COURSE & TREATMENT

## 2024-03-07 LAB
BACTERIA SPEC CULT: NORMAL
GRAM STN SPEC: NORMAL
SERVICE CMNT-IMP: NORMAL

## 2024-03-09 ENCOUNTER — HOSPITAL ENCOUNTER (INPATIENT)
Facility: HOSPITAL | Age: 79
LOS: 6 days | End: 2024-03-15
Attending: EMERGENCY MEDICINE | Admitting: STUDENT IN AN ORGANIZED HEALTH CARE EDUCATION/TRAINING PROGRAM
Payer: MEDICARE

## 2024-03-09 ENCOUNTER — APPOINTMENT (OUTPATIENT)
Facility: HOSPITAL | Age: 79
End: 2024-03-09
Payer: MEDICARE

## 2024-03-09 DIAGNOSIS — J96.01 ACUTE RESPIRATORY FAILURE WITH HYPOXIA (HCC): ICD-10-CM

## 2024-03-09 DIAGNOSIS — R79.89 ELEVATED BRAIN NATRIURETIC PEPTIDE (BNP) LEVEL: ICD-10-CM

## 2024-03-09 DIAGNOSIS — N17.9 AKI (ACUTE KIDNEY INJURY) (HCC): ICD-10-CM

## 2024-03-09 DIAGNOSIS — R65.20 SEVERE SEPSIS (HCC): ICD-10-CM

## 2024-03-09 DIAGNOSIS — A41.9 SEVERE SEPSIS (HCC): ICD-10-CM

## 2024-03-09 DIAGNOSIS — I82.4Y9 DEEP VEIN THROMBOSIS (DVT) OF PROXIMAL LOWER EXTREMITY, UNSPECIFIED CHRONICITY, UNSPECIFIED LATERALITY (HCC): Primary | ICD-10-CM

## 2024-03-09 LAB
ALBUMIN SERPL-MCNC: 2 G/DL (ref 3.5–5)
ALBUMIN/GLOB SERPL: 0.4 (ref 1.1–2.2)
ALP SERPL-CCNC: 75 U/L (ref 45–117)
ALT SERPL-CCNC: 16 U/L (ref 12–78)
ANION GAP SERPL CALC-SCNC: 4 MMOL/L (ref 5–15)
ARTERIAL PATENCY WRIST A: POSITIVE
AST SERPL-CCNC: 23 U/L (ref 15–37)
B PERT DNA SPEC QL NAA+PROBE: NOT DETECTED
BASE EXCESS BLD CALC-SCNC: 0.5 MMOL/L
BASOPHILS # BLD: 0 K/UL (ref 0–0.1)
BASOPHILS NFR BLD: 0 % (ref 0–1)
BDY SITE: ABNORMAL
BILIRUB SERPL-MCNC: 0.3 MG/DL (ref 0.2–1)
BORDETELLA PARAPERTUSSIS BY PCR: NOT DETECTED
BUN SERPL-MCNC: 39 MG/DL (ref 6–20)
BUN/CREAT SERPL: 15 (ref 12–20)
C PNEUM DNA SPEC QL NAA+PROBE: NOT DETECTED
CALCIUM SERPL-MCNC: 8.5 MG/DL (ref 8.5–10.1)
CHLORIDE SERPL-SCNC: 108 MMOL/L (ref 97–108)
CO2 SERPL-SCNC: 26 MMOL/L (ref 21–32)
CREAT SERPL-MCNC: 2.68 MG/DL (ref 0.7–1.3)
DIFFERENTIAL METHOD BLD: ABNORMAL
EOSINOPHIL # BLD: 0 K/UL (ref 0–0.4)
EOSINOPHIL NFR BLD: 0 % (ref 0–7)
ERYTHROCYTE [DISTWIDTH] IN BLOOD BY AUTOMATED COUNT: 14.3 % (ref 11.5–14.5)
FLUAV AG NPH QL IA: NEGATIVE
FLUAV SUBTYP SPEC NAA+PROBE: NOT DETECTED
FLUBV AG NOSE QL IA: NEGATIVE
FLUBV RNA SPEC QL NAA+PROBE: NOT DETECTED
GAS FLOW.O2 O2 DELIVERY SYS: ABNORMAL
GLOBULIN SER CALC-MCNC: 4.6 G/DL (ref 2–4)
GLUCOSE SERPL-MCNC: 184 MG/DL (ref 65–100)
HADV DNA SPEC QL NAA+PROBE: NOT DETECTED
HCO3 BLD-SCNC: 24.4 MMOL/L (ref 22–26)
HCOV 229E RNA SPEC QL NAA+PROBE: NOT DETECTED
HCOV HKU1 RNA SPEC QL NAA+PROBE: NOT DETECTED
HCOV NL63 RNA SPEC QL NAA+PROBE: NOT DETECTED
HCOV OC43 RNA SPEC QL NAA+PROBE: NOT DETECTED
HCT VFR BLD AUTO: 28.9 % (ref 36.6–50.3)
HGB BLD-MCNC: 9.1 G/DL (ref 12.1–17)
HMPV RNA SPEC QL NAA+PROBE: NOT DETECTED
HPIV1 RNA SPEC QL NAA+PROBE: NOT DETECTED
HPIV2 RNA SPEC QL NAA+PROBE: NOT DETECTED
HPIV3 RNA SPEC QL NAA+PROBE: NOT DETECTED
HPIV4 RNA SPEC QL NAA+PROBE: NOT DETECTED
IMM GRANULOCYTES # BLD AUTO: 0.2 K/UL (ref 0–0.04)
IMM GRANULOCYTES NFR BLD AUTO: 1 % (ref 0–0.5)
LACTATE BLD-SCNC: 1.17 MMOL/L (ref 0.4–2)
LYMPHOCYTES # BLD: 0.6 K/UL (ref 0.8–3.5)
LYMPHOCYTES NFR BLD: 4 % (ref 12–49)
M PNEUMO DNA SPEC QL NAA+PROBE: NOT DETECTED
MAGNESIUM SERPL-MCNC: 2.1 MG/DL (ref 1.6–2.4)
MCH RBC QN AUTO: 29.4 PG (ref 26–34)
MCHC RBC AUTO-ENTMCNC: 31.5 G/DL (ref 30–36.5)
MCV RBC AUTO: 93.2 FL (ref 80–99)
MONOCYTES # BLD: 1.1 K/UL (ref 0–1)
MONOCYTES NFR BLD: 7 % (ref 5–13)
NEUTS SEG # BLD: 13.7 K/UL (ref 1.8–8)
NEUTS SEG NFR BLD: 88 % (ref 32–75)
NRBC # BLD: 0 K/UL (ref 0–0.01)
NRBC BLD-RTO: 0 PER 100 WBC
NT PRO BNP: 2968 PG/ML
PCO2 BLD: 36.3 MMHG (ref 35–45)
PH BLD: 7.44 (ref 7.35–7.45)
PLATELET # BLD AUTO: 197 K/UL (ref 150–400)
PMV BLD AUTO: 12.2 FL (ref 8.9–12.9)
PO2 BLD: 57 MMHG (ref 80–100)
POTASSIUM SERPL-SCNC: 4.2 MMOL/L (ref 3.5–5.1)
PROCALCITONIN SERPL-MCNC: 4.05 NG/ML
PROT SERPL-MCNC: 6.6 G/DL (ref 6.4–8.2)
RBC # BLD AUTO: 3.1 M/UL (ref 4.1–5.7)
RBC MORPH BLD: ABNORMAL
RSV RNA SPEC QL NAA+PROBE: NOT DETECTED
RV+EV RNA SPEC QL NAA+PROBE: NOT DETECTED
SAO2 % BLD: 90.4 % (ref 92–97)
SARS-COV-2 RDRP RESP QL NAA+PROBE: NOT DETECTED
SARS-COV-2 RNA RESP QL NAA+PROBE: NOT DETECTED
SODIUM SERPL-SCNC: 138 MMOL/L (ref 136–145)
SOURCE: NORMAL
SPECIMEN TYPE: ABNORMAL
TROPONIN I SERPL HS-MCNC: 26 NG/L (ref 0–76)
WBC # BLD AUTO: 15.6 K/UL (ref 4.1–11.1)

## 2024-03-09 PROCEDURE — 96368 THER/DIAG CONCURRENT INF: CPT

## 2024-03-09 PROCEDURE — 84484 ASSAY OF TROPONIN QUANT: CPT

## 2024-03-09 PROCEDURE — 84145 PROCALCITONIN (PCT): CPT

## 2024-03-09 PROCEDURE — 2580000003 HC RX 258: Performed by: EMERGENCY MEDICINE

## 2024-03-09 PROCEDURE — 83605 ASSAY OF LACTIC ACID: CPT

## 2024-03-09 PROCEDURE — 6370000000 HC RX 637 (ALT 250 FOR IP): Performed by: EMERGENCY MEDICINE

## 2024-03-09 PROCEDURE — 6370000000 HC RX 637 (ALT 250 FOR IP): Performed by: STUDENT IN AN ORGANIZED HEALTH CARE EDUCATION/TRAINING PROGRAM

## 2024-03-09 PROCEDURE — 6360000002 HC RX W HCPCS: Performed by: STUDENT IN AN ORGANIZED HEALTH CARE EDUCATION/TRAINING PROGRAM

## 2024-03-09 PROCEDURE — 6360000002 HC RX W HCPCS: Performed by: EMERGENCY MEDICINE

## 2024-03-09 PROCEDURE — 87804 INFLUENZA ASSAY W/OPTIC: CPT

## 2024-03-09 PROCEDURE — 87040 BLOOD CULTURE FOR BACTERIA: CPT

## 2024-03-09 PROCEDURE — 93005 ELECTROCARDIOGRAM TRACING: CPT | Performed by: EMERGENCY MEDICINE

## 2024-03-09 PROCEDURE — 36415 COLL VENOUS BLD VENIPUNCTURE: CPT

## 2024-03-09 PROCEDURE — 0202U NFCT DS 22 TRGT SARS-COV-2: CPT

## 2024-03-09 PROCEDURE — 2580000003 HC RX 258: Performed by: STUDENT IN AN ORGANIZED HEALTH CARE EDUCATION/TRAINING PROGRAM

## 2024-03-09 PROCEDURE — 71045 X-RAY EXAM CHEST 1 VIEW: CPT

## 2024-03-09 PROCEDURE — 36600 WITHDRAWAL OF ARTERIAL BLOOD: CPT

## 2024-03-09 PROCEDURE — 83735 ASSAY OF MAGNESIUM: CPT

## 2024-03-09 PROCEDURE — 2060000000 HC ICU INTERMEDIATE R&B

## 2024-03-09 PROCEDURE — 85025 COMPLETE CBC W/AUTO DIFF WBC: CPT

## 2024-03-09 PROCEDURE — 82803 BLOOD GASES ANY COMBINATION: CPT

## 2024-03-09 PROCEDURE — 99285 EMERGENCY DEPT VISIT HI MDM: CPT

## 2024-03-09 PROCEDURE — 83880 ASSAY OF NATRIURETIC PEPTIDE: CPT

## 2024-03-09 PROCEDURE — 80053 COMPREHEN METABOLIC PANEL: CPT

## 2024-03-09 PROCEDURE — 87635 SARS-COV-2 COVID-19 AMP PRB: CPT

## 2024-03-09 PROCEDURE — 96365 THER/PROPH/DIAG IV INF INIT: CPT

## 2024-03-09 PROCEDURE — 94640 AIRWAY INHALATION TREATMENT: CPT

## 2024-03-09 RX ORDER — KETOROLAC TROMETHAMINE 30 MG/ML
15 INJECTION, SOLUTION INTRAMUSCULAR; INTRAVENOUS ONCE
Status: COMPLETED | OUTPATIENT
Start: 2024-03-09 | End: 2024-03-09

## 2024-03-09 RX ORDER — SODIUM CHLORIDE 9 MG/ML
INJECTION, SOLUTION INTRAVENOUS PRN
Status: DISCONTINUED | OUTPATIENT
Start: 2024-03-09 | End: 2024-03-15 | Stop reason: HOSPADM

## 2024-03-09 RX ORDER — ONDANSETRON 4 MG/1
4 TABLET, ORALLY DISINTEGRATING ORAL EVERY 8 HOURS PRN
Status: DISCONTINUED | OUTPATIENT
Start: 2024-03-09 | End: 2024-03-15 | Stop reason: HOSPADM

## 2024-03-09 RX ORDER — NORTRIPTYLINE HYDROCHLORIDE 25 MG/1
50 CAPSULE ORAL NIGHTLY
Status: DISCONTINUED | OUTPATIENT
Start: 2024-03-09 | End: 2024-03-15 | Stop reason: HOSPADM

## 2024-03-09 RX ORDER — ASPIRIN 81 MG/1
81 TABLET ORAL DAILY
Status: DISCONTINUED | OUTPATIENT
Start: 2024-03-09 | End: 2024-03-15 | Stop reason: HOSPADM

## 2024-03-09 RX ORDER — IPRATROPIUM BROMIDE AND ALBUTEROL SULFATE 2.5; .5 MG/3ML; MG/3ML
1 SOLUTION RESPIRATORY (INHALATION)
Status: DISCONTINUED | OUTPATIENT
Start: 2024-03-09 | End: 2024-03-11

## 2024-03-09 RX ORDER — MONTELUKAST SODIUM 10 MG/1
10 TABLET ORAL NIGHTLY
Status: DISCONTINUED | OUTPATIENT
Start: 2024-03-09 | End: 2024-03-14

## 2024-03-09 RX ORDER — 0.9 % SODIUM CHLORIDE 0.9 %
2000 INTRAVENOUS SOLUTION INTRAVENOUS ONCE
Status: COMPLETED | OUTPATIENT
Start: 2024-03-09 | End: 2024-03-09

## 2024-03-09 RX ORDER — SODIUM CHLORIDE 0.9 % (FLUSH) 0.9 %
5-40 SYRINGE (ML) INJECTION EVERY 12 HOURS SCHEDULED
Status: DISCONTINUED | OUTPATIENT
Start: 2024-03-09 | End: 2024-03-15 | Stop reason: HOSPADM

## 2024-03-09 RX ORDER — SODIUM CHLORIDE 9 MG/ML
INJECTION, SOLUTION INTRAVENOUS CONTINUOUS
Status: DISPENSED | OUTPATIENT
Start: 2024-03-09 | End: 2024-03-11

## 2024-03-09 RX ORDER — POLYETHYLENE GLYCOL 3350 17 G/17G
17 POWDER, FOR SOLUTION ORAL DAILY PRN
Status: DISCONTINUED | OUTPATIENT
Start: 2024-03-09 | End: 2024-03-15 | Stop reason: HOSPADM

## 2024-03-09 RX ORDER — GABAPENTIN 600 MG/1
1200 TABLET ORAL 3 TIMES DAILY
Status: DISCONTINUED | OUTPATIENT
Start: 2024-03-09 | End: 2024-03-09 | Stop reason: SDUPTHER

## 2024-03-09 RX ORDER — ONDANSETRON 2 MG/ML
4 INJECTION INTRAMUSCULAR; INTRAVENOUS EVERY 6 HOURS PRN
Status: DISCONTINUED | OUTPATIENT
Start: 2024-03-09 | End: 2024-03-15 | Stop reason: HOSPADM

## 2024-03-09 RX ORDER — ACETAMINOPHEN 650 MG/1
650 SUPPOSITORY RECTAL EVERY 6 HOURS PRN
Status: DISCONTINUED | OUTPATIENT
Start: 2024-03-09 | End: 2024-03-15 | Stop reason: HOSPADM

## 2024-03-09 RX ORDER — SODIUM CHLORIDE 0.9 % (FLUSH) 0.9 %
5-40 SYRINGE (ML) INJECTION PRN
Status: DISCONTINUED | OUTPATIENT
Start: 2024-03-09 | End: 2024-03-15 | Stop reason: HOSPADM

## 2024-03-09 RX ORDER — SENNOSIDES A AND B 8.6 MG/1
1 TABLET, FILM COATED ORAL DAILY
COMMUNITY

## 2024-03-09 RX ORDER — GABAPENTIN 600 MG/1
300 TABLET ORAL 2 TIMES DAILY
Status: DISCONTINUED | OUTPATIENT
Start: 2024-03-09 | End: 2024-03-15 | Stop reason: HOSPADM

## 2024-03-09 RX ORDER — COLCHICINE 0.6 MG/1
0.6 TABLET ORAL DAILY PRN
COMMUNITY
End: 2024-03-09 | Stop reason: ALTCHOICE

## 2024-03-09 RX ORDER — LACTOBACILLUS RHAMNOSUS GG 10B CELL
1 CAPSULE ORAL
Status: DISCONTINUED | OUTPATIENT
Start: 2024-03-10 | End: 2024-03-15 | Stop reason: HOSPADM

## 2024-03-09 RX ORDER — IPRATROPIUM BROMIDE AND ALBUTEROL SULFATE 2.5; .5 MG/3ML; MG/3ML
1 SOLUTION RESPIRATORY (INHALATION)
Status: COMPLETED | OUTPATIENT
Start: 2024-03-09 | End: 2024-03-09

## 2024-03-09 RX ORDER — ACETAMINOPHEN 325 MG/1
650 TABLET ORAL EVERY 6 HOURS PRN
Status: DISCONTINUED | OUTPATIENT
Start: 2024-03-09 | End: 2024-03-15 | Stop reason: HOSPADM

## 2024-03-09 RX ORDER — POLYETHYLENE GLYCOL 3350 17 G/17G
17 POWDER, FOR SOLUTION ORAL DAILY
COMMUNITY

## 2024-03-09 RX ORDER — LANOLIN ALCOHOL/MO/W.PET/CERES
6 CREAM (GRAM) TOPICAL NIGHTLY PRN
Status: DISCONTINUED | OUTPATIENT
Start: 2024-03-09 | End: 2024-03-15

## 2024-03-09 RX ORDER — IPRATROPIUM BROMIDE AND ALBUTEROL SULFATE 2.5; .5 MG/3ML; MG/3ML
1 SOLUTION RESPIRATORY (INHALATION) ONCE
Status: COMPLETED | OUTPATIENT
Start: 2024-03-09 | End: 2024-03-09

## 2024-03-09 RX ADMIN — NORTRIPTYLINE HYDROCHLORIDE 50 MG: 25 CAPSULE ORAL at 21:02

## 2024-03-09 RX ADMIN — PIPERACILLIN SODIUM AND TAZOBACTAM SODIUM 4500 MG: 4; .5 INJECTION, POWDER, LYOPHILIZED, FOR SOLUTION INTRAVENOUS at 12:15

## 2024-03-09 RX ADMIN — IPRATROPIUM BROMIDE AND ALBUTEROL SULFATE 1 DOSE: .5; 3 SOLUTION RESPIRATORY (INHALATION) at 12:07

## 2024-03-09 RX ADMIN — KETOROLAC TROMETHAMINE 15 MG: 30 INJECTION, SOLUTION INTRAMUSCULAR; INTRAVENOUS at 12:40

## 2024-03-09 RX ADMIN — IPRATROPIUM BROMIDE AND ALBUTEROL SULFATE 1 DOSE: .5; 3 SOLUTION RESPIRATORY (INHALATION) at 15:18

## 2024-03-09 RX ADMIN — SODIUM CHLORIDE: 9 INJECTION, SOLUTION INTRAVENOUS at 21:16

## 2024-03-09 RX ADMIN — APIXABAN 5 MG: 5 TABLET, FILM COATED ORAL at 21:02

## 2024-03-09 RX ADMIN — APIXABAN 5 MG: 5 TABLET, FILM COATED ORAL at 15:07

## 2024-03-09 RX ADMIN — GABAPENTIN 300 MG: 600 TABLET, FILM COATED ORAL at 15:07

## 2024-03-09 RX ADMIN — AZITHROMYCIN MONOHYDRATE 500 MG: 500 INJECTION, POWDER, LYOPHILIZED, FOR SOLUTION INTRAVENOUS at 16:13

## 2024-03-09 RX ADMIN — SODIUM CHLORIDE 2000 ML: 9 INJECTION, SOLUTION INTRAVENOUS at 13:05

## 2024-03-09 RX ADMIN — IPRATROPIUM BROMIDE AND ALBUTEROL SULFATE 1 DOSE: .5; 3 SOLUTION RESPIRATORY (INHALATION) at 12:22

## 2024-03-09 RX ADMIN — METHYLPREDNISOLONE SODIUM SUCCINATE 125 MG: 125 INJECTION INTRAMUSCULAR; INTRAVENOUS at 15:08

## 2024-03-09 RX ADMIN — GABAPENTIN 300 MG: 600 TABLET, FILM COATED ORAL at 21:02

## 2024-03-09 RX ADMIN — WATER 2000 MG: 1 INJECTION INTRAMUSCULAR; INTRAVENOUS; SUBCUTANEOUS at 15:56

## 2024-03-09 RX ADMIN — SODIUM CHLORIDE, PRESERVATIVE FREE 10 ML: 5 INJECTION INTRAVENOUS at 21:04

## 2024-03-09 RX ADMIN — MONTELUKAST 10 MG: 10 TABLET, FILM COATED ORAL at 21:02

## 2024-03-09 RX ADMIN — IPRATROPIUM BROMIDE AND ALBUTEROL SULFATE 1 DOSE: .5; 3 SOLUTION RESPIRATORY (INHALATION) at 12:00

## 2024-03-09 RX ADMIN — Medication 2500 MG: at 13:04

## 2024-03-09 RX ADMIN — SODIUM CHLORIDE: 9 INJECTION, SOLUTION INTRAVENOUS at 21:11

## 2024-03-09 RX ADMIN — ASPIRIN 81 MG: 81 TABLET, COATED ORAL at 15:07

## 2024-03-09 ASSESSMENT — PAIN SCALES - GENERAL
PAINLEVEL_OUTOF10: 0

## 2024-03-09 ASSESSMENT — LIFESTYLE VARIABLES
HOW MANY STANDARD DRINKS CONTAINING ALCOHOL DO YOU HAVE ON A TYPICAL DAY: PATIENT DOES NOT DRINK
HOW OFTEN DO YOU HAVE A DRINK CONTAINING ALCOHOL: NEVER

## 2024-03-09 ASSESSMENT — PAIN - FUNCTIONAL ASSESSMENT: PAIN_FUNCTIONAL_ASSESSMENT: NONE - DENIES PAIN

## 2024-03-09 NOTE — ED NOTES
1253: While patient on RA trial off of BiPap the patient's oxygen dipped to 81%. Patient placed back on BiPap, MD made aware.   1346: Patient taken off Bipap per MD verbal orders and placed on 6L NC. Satting 91%. RT called for midflow.

## 2024-03-09 NOTE — PROGRESS NOTES
Admission Medication Reconciliation:    Information obtained from:  patient, wife, [facility] paperwork, Dispense history, Discharge paper work from [facility]  [facility]:  Yes    Comments/Recommendations: Updated PTA meds/reviewed patient's allergies.    1)  Patient is good historian. However, with recent hospitalizations/rehab stays there have been many changes to medications.     2)  Medication changes (since last review):    Added  - Senna and polyethylene glycol daily per [facility] paperwork.     Adjusted:  -Gabapentin to 1200 mg BID per [facility] paperwork    3) Amlodipine 10 mg was being administered to patient at [facility] prior to current admission. Prescribed by Dr. Haque at [facility] upon discharge on 2/28. Wife and patient are not familiar with this medication. Provided education.     4) Rosuvastatin was not being administered to patient at [facility] due to use of Daptomycin during stay. Patient was taking rosuvastatin prior to recent admissions.    5) Colchicine 0.6 mg daily- Patient was taking prior to admission but has NOT been receiving at [facility]. Discharge paper work from [facility] on 2/28 note discontinuation of colchicine. Not added to med list.    6) Klor-Con 10 mEq: Patient and wife recall his med. Patient has not been taking at [facility]. Discharge paper work from [place] on 2/28 note discontinuation of Klor-Con. Not added to med list.     ¹[employer] pharmacy benefit data reflects medications filled and processed through the patient's insurance, however   this data does NOT capture whether the medication was picked up or is currently being taken by the patient.    Allergies:  Atorvastatin, Prunus persica, Simvastatin, Sulfa antibiotics, and Doxycycline    Significant PMH/Disease States:   Past Medical History:   Diagnosis Date    Chronic obstructive pulmonary disease (HCC) 05/08/2016    DVT (deep venous thrombosis) ([facility])      tablet     Sig: Take 1 tablet by mouth daily   sublingual tablet cyanocobalamin 2500 MCG SUBL     Sig: Take 1 tablet by mouth daily      Facility-Administered Medications: None     Please contact the main inpatient pharmacy with any questions or concerns at [phone] and we will direct you to the clinical pharmacist covering this patient's care while in-house.   Mary Lou Narayan RP

## 2024-03-09 NOTE — ED TRIAGE NOTES
Pt arrives from [facility] for fever and tachycardia, ems reports pt had right ankle fx with hardware, infection to ankle pt is on abx picc line to right arm. Hx copd on RA at baseline pt is now on 4lpm

## 2024-03-09 NOTE — H&P
History & Physical    Primary Care Provider: JANUARY Aranda MD  Source of Information: Patient and chart review    History of Presenting Illness:   Kingsley Wilson is a 78 y.o. male with past medical history significant for type 2 diabetes, diabetic neuropathy, gout, hypertension, dyslipidemia, history of aortic valve stenosis status post TAVR, history of factor V Leiden, COPD with recent right lower extremity DVT on Eliquis, chronic orthopedic hardware infection after open reduction internal fixation of right lateral malleolus fracture who presents to hospital from [facility] rehab for concerns of shortness of breath.  Was reportedly undergoing rehab at [facility].  He has had progressive worsening shortness of breath and today was noted to be hypoxic with O2 sats to 81% on room air.  EMS was activated and he was transported to hospital on BiPAP.    Chart review shows recent admission at [facility] from February 19 to February 28 for cellulitis and infection of right lower extremity wound status post washout and discharged on chronic antibiotics via PICC.  He was discharged on 2 weeks of IV daptomycin and ceftriaxone.    The patient denies any fever, chills, chest or abdominal pain, nausea, vomiting, cough, congestion, recent illness, palpitations, or dysuria.    Remarkable vitals on ER Presentation: Heart rate to 110, SpO2 81% on room air, respiratory rate to 28  Labs Remarkable for: Creatinine 2.68, BNP 2968, WBC 15.6, hemoglobin 9.1, Pro-Luis 4.05, rapid COVID and influenza negative  ER Images: Chest x-ray: Severe bilateral airspace disease.  ER Rx: DuoNebs, Zosyn, vancomycin, 2 L normal saline bolus     Review of Systems:  Pertinent items are noted in the History of Present Illness.     Past Medical History:   Diagnosis Date    Chronic obstructive pulmonary disease (HCC) 05/08/2016    DVT (deep venous thrombosis) ([facility])     several clots in legs    Factor 5 Leiden mutation,  while on daptomycin          FEN/GI -  ns@100ml/hr  Activity - as tolerated  DVT prophylaxis - eliquis  GI prophylaxis -  none indicated  Disposition - home    CODE STATUS:   full code    CRITICAL CARE ATTESTATION:  I had a face to face encounter with the patient, reviewed and interpreted patient data including clinical events, labs, images, vital signs, I/O's, and examined patient.  I have discussed the case and the plan and management of the patient's care with the consulting services, the bedside nurses and necessary ancillary providers.       NOTE OF PERSONAL INVOLVEMENT IN CARE   This patient has a high probability of imminent, clinically significant deterioration, which requires the highest level of preparedness to intervene urgently. I participated in the decision-making and personally managed or directed the management of the following life and organ supporting interventions that required my frequent assessment to treat or prevent imminent deterioration.     I personally spent 65 minutes of critical care time.  This is time spent at this critically ill patient's bedside actively involved in patient care as well as the coordination of care and discussions with the patient's family.  This does not include any procedural time which has been billed separately.         Signed By: Jasvir Leos MD     March 9, 2024

## 2024-03-09 NOTE — PROGRESS NOTES
Day #1 of Cefepime  Indication:  CAP  Current regimen:  2 gram q12h  Abx regimen: Cefepime + Azithromycin  Recent Labs     24  1127   WBC 15.6*   CREATININE 2.68*   BUN 39*     Est CrCl: 28 ml/min; UO: -- ml/kg/hr  Temp (24hrs), Av.4 °F (38.6 °C), Min:101.4 °F (38.6 °C), Max:101.4 °F (38.6 °C)    Cultures:   3/9 Blood 1: NG pending  3/9 Blood 2: NG pending    Imaging:   3/9 Xray Chest: severe airspace disease in RUL    Plan: Change to Cefepime  2000mg (IV push) which will be followed by a maintenance regimen of 1000 milligram(s) IV Q 12 hours (each dose will be infused over 4 hours) with respect to indication and renal status (CrCl 11-29 mL/min) per [facility]/P&T-approved Renal Dosing Adjustment protocol and Extended Infusion Beta-Lactam Antibiotics policy. Pharmacy will continue to monitor this patient daily for changes in clinical status and renal function.    Betzaida Santoyo, PharmD, Clinical Pharmacist

## 2024-03-09 NOTE — PROGRESS NOTES
Renal Dosing/Monitoring  Medication: gabapentin   Current regimen:  1200 mg q8h   Recent Labs     03/09/24  1127   CREATININE 2.68*   BUN 39*     Estimated CrCl:  28 ml/min  Plan: Change to 300 mg bid  per [facility] P&T Committee Protocol with respect to renal function.  Pharmacy will continue to monitor patient daily and will make dosage adjustments based upon changing renal function.
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
## 2024-03-09 NOTE — PROGRESS NOTES
Pharmacist Note - Vancomycin Dosing    Consult provided for this 78 y.o. male for indication of CAP.  Antibiotic regimen(s): Vancomycin + azithromycin + cefepime   Patient on vancomycin PTA? NO     Recent Labs     24  1127   WBC 15.6*   CREATININE 2.68*   BUN 39*     Frequency of BMP: Daily  Height: 185.4 cm  Weight: 96.2 kg  Est CrCl: 28 ml/min;   Temp (24hrs), Av.4 °F (38.6 °C), Min:101.4 °F (38.6 °C), Max:101.4 °F (38.6 °C)    Cultures:  3/9: Blood cx x2: pending     MRSA Swab ordered (if applicable)? YES    The plan below is expected to result in a target range of Trough 10-15 mcg/mL - dosing by levels due to patient's reduce renal function      Therapy will be initiated with a loading dose of 2500 mg IV x 1, dosing by levels. Vancomycin random level ordered for 3/10 @0400.   Pharmacy to follow patient daily and order levels / make dose adjustments as appropriate.

## 2024-03-10 ENCOUNTER — APPOINTMENT (OUTPATIENT)
Facility: HOSPITAL | Age: 79
End: 2024-03-10
Payer: MEDICARE

## 2024-03-10 PROBLEM — A41.9 SEVERE SEPSIS (HCC): Status: ACTIVE | Noted: 2024-03-10

## 2024-03-10 PROBLEM — R65.20 SEVERE SEPSIS (HCC): Status: ACTIVE | Noted: 2024-03-10

## 2024-03-10 LAB
ALBUMIN SERPL-MCNC: 2 G/DL (ref 3.5–5)
ALBUMIN/GLOB SERPL: 0.4 (ref 1.1–2.2)
ALP SERPL-CCNC: 76 U/L (ref 45–117)
ALT SERPL-CCNC: 17 U/L (ref 12–78)
ANION GAP SERPL CALC-SCNC: 4 MMOL/L (ref 5–15)
AST SERPL-CCNC: 18 U/L (ref 15–37)
BACTERIA SPEC CULT: NORMAL
BACTERIA SPEC CULT: NORMAL
BASOPHILS # BLD: 0 K/UL (ref 0–0.1)
BASOPHILS NFR BLD: 0 % (ref 0–1)
BILIRUB SERPL-MCNC: 0.3 MG/DL (ref 0.2–1)
BUN SERPL-MCNC: 48 MG/DL (ref 6–20)
BUN/CREAT SERPL: 17 (ref 12–20)
CALCIUM SERPL-MCNC: 8.8 MG/DL (ref 8.5–10.1)
CHLORIDE SERPL-SCNC: 111 MMOL/L (ref 97–108)
CO2 SERPL-SCNC: 25 MMOL/L (ref 21–32)
CREAT SERPL-MCNC: 2.84 MG/DL (ref 0.7–1.3)
DIFFERENTIAL METHOD BLD: ABNORMAL
EOSINOPHIL # BLD: 0 K/UL (ref 0–0.4)
EOSINOPHIL NFR BLD: 0 % (ref 0–7)
ERYTHROCYTE [DISTWIDTH] IN BLOOD BY AUTOMATED COUNT: 14.2 % (ref 11.5–14.5)
GLOBULIN SER CALC-MCNC: 4.9 G/DL (ref 2–4)
GLUCOSE SERPL-MCNC: 193 MG/DL (ref 65–100)
HCT VFR BLD AUTO: 29.3 % (ref 36.6–50.3)
HGB BLD-MCNC: 9.2 G/DL (ref 12.1–17)
IMM GRANULOCYTES # BLD AUTO: 0.1 K/UL (ref 0–0.04)
IMM GRANULOCYTES NFR BLD AUTO: 1 % (ref 0–0.5)
LYMPHOCYTES # BLD: 0.4 K/UL (ref 0.8–3.5)
LYMPHOCYTES NFR BLD: 3 % (ref 12–49)
MCH RBC QN AUTO: 29.2 PG (ref 26–34)
MCHC RBC AUTO-ENTMCNC: 31.4 G/DL (ref 30–36.5)
MCV RBC AUTO: 93 FL (ref 80–99)
MONOCYTES # BLD: 0.2 K/UL (ref 0–1)
MONOCYTES NFR BLD: 2 % (ref 5–13)
NEUTS SEG # BLD: 11.3 K/UL (ref 1.8–8)
NEUTS SEG NFR BLD: 94 % (ref 32–75)
NRBC # BLD: 0 K/UL (ref 0–0.01)
NRBC BLD-RTO: 0 PER 100 WBC
PLATELET # BLD AUTO: 203 K/UL (ref 150–400)
PMV BLD AUTO: 12 FL (ref 8.9–12.9)
POTASSIUM SERPL-SCNC: 4.1 MMOL/L (ref 3.5–5.1)
PROT SERPL-MCNC: 6.9 G/DL (ref 6.4–8.2)
RBC # BLD AUTO: 3.15 M/UL (ref 4.1–5.7)
RBC MORPH BLD: ABNORMAL
SERVICE CMNT-IMP: NORMAL
SODIUM SERPL-SCNC: 140 MMOL/L (ref 136–145)
VANCOMYCIN SERPL-MCNC: 19.3 UG/ML
WBC # BLD AUTO: 12 K/UL (ref 4.1–11.1)

## 2024-03-10 PROCEDURE — 6360000002 HC RX W HCPCS: Performed by: INTERNAL MEDICINE

## 2024-03-10 PROCEDURE — 2700000000 HC OXYGEN THERAPY PER DAY

## 2024-03-10 PROCEDURE — 85025 COMPLETE CBC W/AUTO DIFF WBC: CPT

## 2024-03-10 PROCEDURE — 6370000000 HC RX 637 (ALT 250 FOR IP): Performed by: STUDENT IN AN ORGANIZED HEALTH CARE EDUCATION/TRAINING PROGRAM

## 2024-03-10 PROCEDURE — 2060000000 HC ICU INTERMEDIATE R&B

## 2024-03-10 PROCEDURE — 2580000003 HC RX 258: Performed by: STUDENT IN AN ORGANIZED HEALTH CARE EDUCATION/TRAINING PROGRAM

## 2024-03-10 PROCEDURE — 6360000002 HC RX W HCPCS: Performed by: STUDENT IN AN ORGANIZED HEALTH CARE EDUCATION/TRAINING PROGRAM

## 2024-03-10 PROCEDURE — 76770 US EXAM ABDO BACK WALL COMP: CPT

## 2024-03-10 PROCEDURE — 2580000003 HC RX 258: Performed by: INTERNAL MEDICINE

## 2024-03-10 PROCEDURE — 6370000000 HC RX 637 (ALT 250 FOR IP): Performed by: EMERGENCY MEDICINE

## 2024-03-10 PROCEDURE — 97161 PT EVAL LOW COMPLEX 20 MIN: CPT | Performed by: PHYSICAL THERAPIST

## 2024-03-10 PROCEDURE — 36415 COLL VENOUS BLD VENIPUNCTURE: CPT

## 2024-03-10 PROCEDURE — 99024 POSTOP FOLLOW-UP VISIT: CPT | Performed by: PHYSICIAN ASSISTANT

## 2024-03-10 PROCEDURE — 80202 ASSAY OF VANCOMYCIN: CPT

## 2024-03-10 PROCEDURE — 97530 THERAPEUTIC ACTIVITIES: CPT | Performed by: PHYSICAL THERAPIST

## 2024-03-10 PROCEDURE — 99222 1ST HOSP IP/OBS MODERATE 55: CPT | Performed by: INTERNAL MEDICINE

## 2024-03-10 PROCEDURE — 94640 AIRWAY INHALATION TREATMENT: CPT

## 2024-03-10 PROCEDURE — 80053 COMPREHEN METABOLIC PANEL: CPT

## 2024-03-10 RX ADMIN — MONTELUKAST 10 MG: 10 TABLET, FILM COATED ORAL at 21:02

## 2024-03-10 RX ADMIN — ASPIRIN 81 MG: 81 TABLET, COATED ORAL at 09:13

## 2024-03-10 RX ADMIN — VANCOMYCIN HYDROCHLORIDE 1000 MG: 1 INJECTION, POWDER, LYOPHILIZED, FOR SOLUTION INTRAVENOUS at 12:19

## 2024-03-10 RX ADMIN — APIXABAN 5 MG: 5 TABLET, FILM COATED ORAL at 09:13

## 2024-03-10 RX ADMIN — Medication 1 CAPSULE: at 09:13

## 2024-03-10 RX ADMIN — IPRATROPIUM BROMIDE AND ALBUTEROL SULFATE 1 DOSE: .5; 3 SOLUTION RESPIRATORY (INHALATION) at 10:57

## 2024-03-10 RX ADMIN — WATER 40 MG: 1 INJECTION INTRAMUSCULAR; INTRAVENOUS; SUBCUTANEOUS at 12:19

## 2024-03-10 RX ADMIN — IPRATROPIUM BROMIDE AND ALBUTEROL SULFATE 1 DOSE: .5; 3 SOLUTION RESPIRATORY (INHALATION) at 16:22

## 2024-03-10 RX ADMIN — SODIUM CHLORIDE: 9 INJECTION, SOLUTION INTRAVENOUS at 22:27

## 2024-03-10 RX ADMIN — IPRATROPIUM BROMIDE AND ALBUTEROL SULFATE 1 DOSE: .5; 3 SOLUTION RESPIRATORY (INHALATION) at 20:39

## 2024-03-10 RX ADMIN — CEFEPIME 1000 MG: 1 INJECTION, POWDER, FOR SOLUTION INTRAMUSCULAR; INTRAVENOUS at 17:11

## 2024-03-10 RX ADMIN — SODIUM CHLORIDE, PRESERVATIVE FREE 10 ML: 5 INJECTION INTRAVENOUS at 21:02

## 2024-03-10 RX ADMIN — GABAPENTIN 300 MG: 600 TABLET, FILM COATED ORAL at 09:13

## 2024-03-10 RX ADMIN — Medication 6 MG: at 22:17

## 2024-03-10 RX ADMIN — WATER 40 MG: 1 INJECTION INTRAMUSCULAR; INTRAVENOUS; SUBCUTANEOUS at 22:17

## 2024-03-10 RX ADMIN — IPRATROPIUM BROMIDE AND ALBUTEROL SULFATE 1 DOSE: .5; 3 SOLUTION RESPIRATORY (INHALATION) at 07:03

## 2024-03-10 RX ADMIN — NORTRIPTYLINE HYDROCHLORIDE 50 MG: 25 CAPSULE ORAL at 21:02

## 2024-03-10 RX ADMIN — SODIUM CHLORIDE: 9 INJECTION, SOLUTION INTRAVENOUS at 04:11

## 2024-03-10 RX ADMIN — CEFEPIME 1000 MG: 1 INJECTION, POWDER, FOR SOLUTION INTRAMUSCULAR; INTRAVENOUS at 04:12

## 2024-03-10 RX ADMIN — APIXABAN 5 MG: 5 TABLET, FILM COATED ORAL at 21:02

## 2024-03-10 RX ADMIN — AZITHROMYCIN MONOHYDRATE 500 MG: 500 INJECTION, POWDER, LYOPHILIZED, FOR SOLUTION INTRAVENOUS at 12:17

## 2024-03-10 ASSESSMENT — PAIN SCALES - GENERAL
PAINLEVEL_OUTOF10: 0

## 2024-03-10 NOTE — PLAN OF CARE
Problem: Discharge Planning  Goal: Discharge to home or other facility with appropriate resources  Outcome: Progressing  Flowsheets (Taken 3/9/2024 2228)  Discharge to home or other facility with appropriate resources: Identify barriers to discharge with patient and caregiver     Problem: Skin/Tissue Integrity  Goal: Absence of new skin breakdown  Description: 1.  Monitor for areas of redness and/or skin breakdown  2.  Assess vascular access sites hourly  3.  Every 4-6 hours minimum:  Change oxygen saturation probe site  4.  Every 4-6 hours:  If on nasal continuous positive airway pressure, respiratory therapy assess nares and determine need for appliance change or resting period.  Outcome: Progressing

## 2024-03-10 NOTE — PROGRESS NOTES
Pharmacist Note - Vancomycin Dosing  Therapy day 2  Indication: concern for CAP  -also with hx recent septic arthritis (was on dapto outpatient to continue through 4/3)  Current regimen: 2500 mg load on 3/9    Recent Labs     03/09/24  1127 03/10/24  0418   WBC 15.6* 12.0*   CREATININE 2.68* 2.84*   BUN 39* 48*       A random vancomycin level of 19.3 mcg/mL was obtained ~14 hours from loading dose given    Goal target range Trough 10-15 mcg/mL      Plan: Change to 1 gram IV x 1 and change to dose by levels . Pharmacy will continue to monitor this patient daily for changes in clinical status and renal function.

## 2024-03-10 NOTE — CONSULTS
Infectious Disease Consult Note    Reason for Consult: orthopedic wound infection  Date of Consultation: March 10, 2024  Date of Admission: 3/9/2024  Referring Physician: Dr Wills      HPI:    The patient was personally evaluated and examined by me today.  The wife is sitting at bedside and helped in obtaining information from the patient who was quite short of breath and had difficulty with talking.  The patient denies headache, fevers, sweats or chills.  The patient is quite tachypneic and complains of shortness of breath on his supplemental oxygen with oxygen saturations in the high 80s to low 90s as I evaluated him.  The patient denies chest pain and states that he has minimal cough and sputum production.  Patient states that he is able to eat and has no nausea, vomiting, diarrhea or constipation.  He denies abdominal pain or discomfort.    The patient states that he thought that he was all finished with antibiotics for his orthopedic hardware infection but continues to have issues with the orthopedic surgical site despite prolonged outpatient IV antibiotic.    The patient was discussed with Dr. Wills who received the patient for inpatient care with complicated orthopedic wound infection status post removal of hardware right ankle/leg and continuing on empiric antibiotic due to growth of operative cultures.      The patient presented by way of the emergency room on 03/09/2024 with a chief complaint of fever and infection in his blood.  The patient was at a rehab facilities when he developed fever prior to admission and was brought to the ED.  The patient also was having trouble prior to his arrival with his COPD and had increased work of breathing on the day of admission.  When EMS picked him up he was found to be hypoxic on room air.  Patient continues to have some difficulty with his right ankle and leg with recent removal of hardware and prolonged IV antibiotic for acute on chronic orthopedic infection.    On    03/09/24 1300 -- (!) 110 21 (!) 122/92 92 %   03/09/24 1259 -- (!) 111 21 -- (!) 88 %   03/09/24 1258 -- (!) 112 18 -- (!) 82 %   03/09/24 1257 -- (!) 112 20 -- (!) 81 %   03/09/24 1256 -- (!) 112 20 -- (!) 82 %   03/09/24 1255 -- (!) 112 28 -- (!) 82 %   03/09/24 1254 -- (!) 112 25 -- (!) 81 %   03/09/24 1245 -- (!) 110 20 (!) 130/116 100 %   03/09/24 1230 -- (!) 110 20 129/69 99 %   03/09/24 1201 -- (!) 114 20 -- 99 %     GEN: Tachypneic on oxygen support with increased work of breathing  HEENT: Normocephalic, atraumatic, PERRL, no scleral icterus, no thrush, periorbital edema, greater on right, with fleshy growth noted right eye  CV: S1, S2 heard regularly,  Lungs: Coarse breath sounds with tachypnea decreased sounds bilateral bases  Abdomen: soft, non distended, non tender, central adiposity no CVA tenderness   Genitourinary: Deferred  Extremities: no edema, limited ROM, right ankle leg abnormal  Neuro: Alert, oriented , moves all extremities, verbal   Skin: no rash  Psych: normal affect, good eye contact, pleasant, conversant  Lines:       Labs:   Recent Results (from the past 24 hour(s))   POC Lactic Acid    Collection Time: 03/09/24 11:26 AM   Result Value Ref Range    POC Lactic Acid 1.17 0.40 - 2.00 mmol/L   CBC with Auto Differential    Collection Time: 03/09/24 11:27 AM   Result Value Ref Range    WBC 15.6 (H) 4.1 - 11.1 K/uL    RBC 3.10 (L) 4.10 - 5.70 M/uL    Hemoglobin 9.1 (L) 12.1 - 17.0 g/dL    Hematocrit 28.9 (L) 36.6 - 50.3 %    MCV 93.2 80.0 - 99.0 FL    MCH 29.4 26.0 - 34.0 PG    MCHC 31.5 30.0 - 36.5 g/dL    RDW 14.3 11.5 - 14.5 %    Platelets 197 150 - 400 K/uL    MPV 12.2 8.9 - 12.9 FL    Nucleated RBCs 0.0 0  WBC    nRBC 0.00 0.00 - 0.01 K/uL    Neutrophils % 88 (H) 32 - 75 %    Lymphocytes % 4 (L) 12 - 49 %    Monocytes % 7 5 - 13 %    Eosinophils % 0 0 - 7 %    Basophils % 0 0 - 1 %    Immature Granulocytes 1 (H) 0.0 - 0.5 %    Neutrophils Absolute 13.7 (H) 1.8 - 8.0 K/UL

## 2024-03-10 NOTE — ED NOTES
TRANSFER - OUT REPORT:    Verbal report given to PERRY Fuller on Kingsley Wilson  being transferred to IMICU for routine progression of patient care       Report consisted of patient's Situation, Background, Assessment and   Recommendations(SBAR).     Information from the following report(s) Nurse Handoff Report, ED Encounter Summary, ED SBAR, and Neuro Assessment was reviewed with the receiving nurse.    [facility] Fall Assessment:    Presents to emergency department  because of falls (Syncope, seizure, or loss of consciousness): No  Age > 70: Yes  Altered Mental Status, Intoxication with alcohol or substance confusion (Disorientation, impaired judgment, poor safety awaremess, or inability to follow instructions): No  Impaired Mobility: Ambulates or transfers with assistive devices or assistance; Unable to ambulate or transer.: Yes  Nursing Judgement: Yes          Lines:   PICC Double Lumen 02/28/24 Right Basilic (Active)       Peripheral IV 03/09/24 Left;Posterior Hand (Active)        Opportunity for questions and clarification was provided.      Patient transported with:  Monitor, O2 @ 15 lpm, and Registered Nurse
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
## 2024-03-10 NOTE — CONSULTS
Orthopedic Progress Note    S: Pain well controlled, just seen by his Surgeon team on 3/5, next appointment was scheduled for 3/19 per their note at which time they would get new xrays and possibly progress WB status; Denies new pain, drainage from ankle incisions, swelling, redness.    O: NAD, respirations unlabored; Incisions well healed, sutures still in place; no erythema or swelling; chronic deformities; CRB, SILT, moves toes and ankle.     Patient Vitals for the past 4 hrs:   Pulse   03/10/24 1200 90   03/10/24 1000 88     Recent Labs     03/09/24  1127 03/10/24  0418   HGB 9.1* 9.2*   HCT 28.9* 29.3*    203   BUN 39* 48*   K 4.2 4.1    140           A/P:  Procedure: * No surgery found *  Post Op day: * No surgery found *    Dressings: wet-to-dry dressings over the lateral incision site and Xeroform dressing changes over the rest of the incision sites they are all healing well with no signs of infection or dehiscence.   PT/OT: TTWB RLE when transitioning around out of his wheelchair but he should remain nonweightbearing otherwise.   ABX: Continue with IV antibiotics under the direction of the Infectious Disease   DVT ppx: okay to resume Eliquis  Pain per primary service  Dispo - Follow up as scheduled, 3/19; If he is still here we can order imaging and take out sutures at that time.     AFUA [place]  Orthopedic Trauma Service  [facility]

## 2024-03-10 NOTE — PROGRESS NOTES
Temp 97.8 °F (36.6 °C) (Oral)   Resp 15   Ht 1.854 m (6' 1\")   Wt 96.2 kg (212 lb 1.3 oz)   SpO2 96%   BMI 27.98 kg/m²         Intake/Output Summary (Last 24 hours) at 3/10/2024 0925  Last data filed at 3/10/2024 0700  Gross per 24 hour   Intake 912.85 ml   Output 450 ml   Net 462.85 ml        I had a face to face encounter and independently examined this patient on 3/10/2024, as outlined below:  PHYSICAL EXAM:  General: WD, WN. Alert, cooperative, no acute distress    EENT:  EOMI. Anicteric sclerae. MMM  Resp:  CTA bilaterally, no wheezing or rales.  No accessory muscle use  CV:  Regular  rhythm,  No edema  GI:  Soft, Non distended, Non tender.  +Bowel sounds  Neurologic:  Alert and oriented X 3, normal speech,   Psych:   Good insight. Not anxious nor agitated  Skin:  No rashes.  No jaundice      Central Line: PICC Double Lumen 02/28/24 Right Basilic-Central Line Being Utilized: Yes      ________________________________________________________________________  Care Plan discussed with:    Comments   Patient x    Family      RN     Care Manager     Consultant                        Multidiciplinary team rounds were held today with , nursing, pharmacist and clinical coordinator.  Patient's plan of care was discussed; medications were reviewed and discharge planning was addressed.     ________________________________________________________________________  Total NON critical care TIME:   35  Minutes    Total CRITICAL CARE TIME Spent:   Minutes non procedure based      Comments   >50% of visit spent in counseling and coordination of care x     This includes time during multidisciplinary rounds if indicated above   ________________________________________________________________________  Eliot Wills MD     Procedures: see electronic medical records for all procedures/Xrays and details which were not copied into this note but were reviewed prior to creation of Plan.      LABS:  I reviewed today's most  current labs and imaging studies.  Pertinent labs include:  Recent Labs     03/09/24  1127 03/10/24  0418   WBC 15.6* 12.0*   HGB 9.1* 9.2*   HCT 28.9* 29.3*    203     Recent Labs     03/09/24  1127 03/10/24  0418    140   K 4.2 4.1    111*   CO2 26 25   BUN 39* 48*   MG 2.1  --    ALT 16 17

## 2024-03-11 ENCOUNTER — APPOINTMENT (OUTPATIENT)
Facility: HOSPITAL | Age: 79
End: 2024-03-11
Payer: MEDICARE

## 2024-03-11 LAB
AMORPH CRY URNS QL MICRO: ABNORMAL
ANION GAP SERPL CALC-SCNC: 8 MMOL/L (ref 5–15)
APPEARANCE UR: ABNORMAL
BACTERIA URNS QL MICRO: NEGATIVE /HPF
BASOPHILS # BLD: 0 K/UL (ref 0–0.1)
BASOPHILS NFR BLD: 0 % (ref 0–1)
BILIRUB UR QL: NEGATIVE
BUN SERPL-MCNC: 67 MG/DL (ref 6–20)
BUN/CREAT SERPL: 21 (ref 12–20)
CALCIUM SERPL-MCNC: 8.4 MG/DL (ref 8.5–10.1)
CHLORIDE SERPL-SCNC: 114 MMOL/L (ref 97–108)
CK SERPL-CCNC: 65 U/L (ref 39–308)
CO2 SERPL-SCNC: 22 MMOL/L (ref 21–32)
COLOR UR: ABNORMAL
CREAT SERPL-MCNC: 3.19 MG/DL (ref 0.7–1.3)
DIFFERENTIAL METHOD BLD: ABNORMAL
EOSINOPHIL # BLD: 0 K/UL (ref 0–0.4)
EOSINOPHIL NFR BLD: 0 % (ref 0–7)
EPITH CASTS URNS QL MICRO: ABNORMAL /LPF
ERYTHROCYTE [DISTWIDTH] IN BLOOD BY AUTOMATED COUNT: 14.4 % (ref 11.5–14.5)
GLUCOSE BLD STRIP.AUTO-MCNC: 193 MG/DL (ref 65–117)
GLUCOSE BLD STRIP.AUTO-MCNC: 235 MG/DL (ref 65–117)
GLUCOSE BLD STRIP.AUTO-MCNC: 246 MG/DL (ref 65–117)
GLUCOSE BLD STRIP.AUTO-MCNC: 248 MG/DL (ref 65–117)
GLUCOSE SERPL-MCNC: 269 MG/DL (ref 65–100)
GLUCOSE UR STRIP.AUTO-MCNC: NEGATIVE MG/DL
HCT VFR BLD AUTO: 28.3 % (ref 36.6–50.3)
HGB BLD-MCNC: 9 G/DL (ref 12.1–17)
HGB UR QL STRIP: ABNORMAL
IMM GRANULOCYTES # BLD AUTO: 0.2 K/UL (ref 0–0.04)
IMM GRANULOCYTES NFR BLD AUTO: 1 % (ref 0–0.5)
KETONES UR QL STRIP.AUTO: NEGATIVE MG/DL
LEUKOCYTE ESTERASE UR QL STRIP.AUTO: NEGATIVE
LYMPHOCYTES # BLD: 0.4 K/UL (ref 0.8–3.5)
LYMPHOCYTES NFR BLD: 2 % (ref 12–49)
MCH RBC QN AUTO: 29.2 PG (ref 26–34)
MCHC RBC AUTO-ENTMCNC: 31.8 G/DL (ref 30–36.5)
MCV RBC AUTO: 91.9 FL (ref 80–99)
MONOCYTES # BLD: 0.5 K/UL (ref 0–1)
MONOCYTES NFR BLD: 3 % (ref 5–13)
MUCOUS THREADS URNS QL MICRO: ABNORMAL /LPF
NEUTS SEG # BLD: 16.7 K/UL (ref 1.8–8)
NEUTS SEG NFR BLD: 94 % (ref 32–75)
NITRITE UR QL STRIP.AUTO: NEGATIVE
NRBC # BLD: 0 K/UL (ref 0–0.01)
NRBC BLD-RTO: 0 PER 100 WBC
PH UR STRIP: 5 (ref 5–8)
PLATELET # BLD AUTO: 248 K/UL (ref 150–400)
PLATELET COMMENT: ABNORMAL
PMV BLD AUTO: 12.1 FL (ref 8.9–12.9)
POTASSIUM SERPL-SCNC: 3.8 MMOL/L (ref 3.5–5.1)
PROT UR STRIP-MCNC: 30 MG/DL
RBC # BLD AUTO: 3.08 M/UL (ref 4.1–5.7)
RBC #/AREA URNS HPF: ABNORMAL /HPF (ref 0–5)
RBC MORPH BLD: ABNORMAL
SERVICE CMNT-IMP: ABNORMAL
SODIUM SERPL-SCNC: 144 MMOL/L (ref 136–145)
SODIUM UR-SCNC: 10 MMOL/L
SP GR UR REFRACTOMETRY: 1.02 (ref 1–1.03)
UROBILINOGEN UR QL STRIP.AUTO: 0.2 EU/DL (ref 0.2–1)
VANCOMYCIN SERPL-MCNC: 21.7 UG/ML
WBC # BLD AUTO: 17.8 K/UL (ref 4.1–11.1)
WBC URNS QL MICRO: ABNORMAL /HPF (ref 0–4)

## 2024-03-11 PROCEDURE — 2060000000 HC ICU INTERMEDIATE R&B

## 2024-03-11 PROCEDURE — 6360000002 HC RX W HCPCS: Performed by: INTERNAL MEDICINE

## 2024-03-11 PROCEDURE — 6370000000 HC RX 637 (ALT 250 FOR IP): Performed by: HOSPITALIST

## 2024-03-11 PROCEDURE — 71045 X-RAY EXAM CHEST 1 VIEW: CPT

## 2024-03-11 PROCEDURE — 80202 ASSAY OF VANCOMYCIN: CPT

## 2024-03-11 PROCEDURE — 97530 THERAPEUTIC ACTIVITIES: CPT

## 2024-03-11 PROCEDURE — 6370000000 HC RX 637 (ALT 250 FOR IP): Performed by: STUDENT IN AN ORGANIZED HEALTH CARE EDUCATION/TRAINING PROGRAM

## 2024-03-11 PROCEDURE — 2580000003 HC RX 258: Performed by: STUDENT IN AN ORGANIZED HEALTH CARE EDUCATION/TRAINING PROGRAM

## 2024-03-11 PROCEDURE — NBSRV NON-BILLABLE SERVICE: Performed by: INTERNAL MEDICINE

## 2024-03-11 PROCEDURE — 97167 OT EVAL HIGH COMPLEX 60 MIN: CPT

## 2024-03-11 PROCEDURE — 87449 NOS EACH ORGANISM AG IA: CPT

## 2024-03-11 PROCEDURE — 36415 COLL VENOUS BLD VENIPUNCTURE: CPT

## 2024-03-11 PROCEDURE — 82962 GLUCOSE BLOOD TEST: CPT

## 2024-03-11 PROCEDURE — 85025 COMPLETE CBC W/AUTO DIFF WBC: CPT

## 2024-03-11 PROCEDURE — 81001 URINALYSIS AUTO W/SCOPE: CPT

## 2024-03-11 PROCEDURE — 6360000002 HC RX W HCPCS: Performed by: STUDENT IN AN ORGANIZED HEALTH CARE EDUCATION/TRAINING PROGRAM

## 2024-03-11 PROCEDURE — 2580000003 HC RX 258: Performed by: INTERNAL MEDICINE

## 2024-03-11 PROCEDURE — 2700000000 HC OXYGEN THERAPY PER DAY

## 2024-03-11 PROCEDURE — 82550 ASSAY OF CK (CPK): CPT

## 2024-03-11 PROCEDURE — 84300 ASSAY OF URINE SODIUM: CPT

## 2024-03-11 PROCEDURE — 80048 BASIC METABOLIC PNL TOTAL CA: CPT

## 2024-03-11 PROCEDURE — 94760 N-INVAS EAR/PLS OXIMETRY 1: CPT

## 2024-03-11 PROCEDURE — 97535 SELF CARE MNGMENT TRAINING: CPT

## 2024-03-11 PROCEDURE — 99223 1ST HOSP IP/OBS HIGH 75: CPT | Performed by: INTERNAL MEDICINE

## 2024-03-11 RX ORDER — DEXTROSE MONOHYDRATE 100 MG/ML
INJECTION, SOLUTION INTRAVENOUS CONTINUOUS PRN
Status: DISCONTINUED | OUTPATIENT
Start: 2024-03-11 | End: 2024-03-15 | Stop reason: HOSPADM

## 2024-03-11 RX ORDER — METOPROLOL SUCCINATE 50 MG/1
50 TABLET, EXTENDED RELEASE ORAL DAILY
Status: DISCONTINUED | OUTPATIENT
Start: 2024-03-11 | End: 2024-03-15 | Stop reason: HOSPADM

## 2024-03-11 RX ORDER — INSULIN LISPRO 100 [IU]/ML
0-4 INJECTION, SOLUTION INTRAVENOUS; SUBCUTANEOUS NIGHTLY
Status: DISCONTINUED | OUTPATIENT
Start: 2024-03-11 | End: 2024-03-15 | Stop reason: HOSPADM

## 2024-03-11 RX ORDER — INSULIN LISPRO 100 [IU]/ML
0-4 INJECTION, SOLUTION INTRAVENOUS; SUBCUTANEOUS
Status: DISCONTINUED | OUTPATIENT
Start: 2024-03-11 | End: 2024-03-15 | Stop reason: HOSPADM

## 2024-03-11 RX ORDER — IPRATROPIUM BROMIDE AND ALBUTEROL SULFATE 2.5; .5 MG/3ML; MG/3ML
1 SOLUTION RESPIRATORY (INHALATION) EVERY 4 HOURS PRN
Status: DISCONTINUED | OUTPATIENT
Start: 2024-03-11 | End: 2024-03-15

## 2024-03-11 RX ADMIN — SODIUM CHLORIDE: 9 INJECTION, SOLUTION INTRAVENOUS at 10:30

## 2024-03-11 RX ADMIN — AZITHROMYCIN MONOHYDRATE 500 MG: 500 INJECTION, POWDER, LYOPHILIZED, FOR SOLUTION INTRAVENOUS at 12:58

## 2024-03-11 RX ADMIN — NORTRIPTYLINE HYDROCHLORIDE 50 MG: 25 CAPSULE ORAL at 21:41

## 2024-03-11 RX ADMIN — SODIUM CHLORIDE, PRESERVATIVE FREE 10 ML: 5 INJECTION INTRAVENOUS at 09:27

## 2024-03-11 RX ADMIN — SODIUM CHLORIDE, PRESERVATIVE FREE 10 ML: 5 INJECTION INTRAVENOUS at 21:41

## 2024-03-11 RX ADMIN — SODIUM CHLORIDE: 9 INJECTION, SOLUTION INTRAVENOUS at 12:58

## 2024-03-11 RX ADMIN — MONTELUKAST 10 MG: 10 TABLET, FILM COATED ORAL at 21:41

## 2024-03-11 RX ADMIN — APIXABAN 5 MG: 5 TABLET, FILM COATED ORAL at 21:41

## 2024-03-11 RX ADMIN — Medication 6 MG: at 23:00

## 2024-03-11 RX ADMIN — Medication 1 CAPSULE: at 09:27

## 2024-03-11 RX ADMIN — WATER 40 MG: 1 INJECTION INTRAMUSCULAR; INTRAVENOUS; SUBCUTANEOUS at 21:41

## 2024-03-11 RX ADMIN — APIXABAN 5 MG: 5 TABLET, FILM COATED ORAL at 09:27

## 2024-03-11 RX ADMIN — INSULIN LISPRO 1 UNITS: 100 INJECTION, SOLUTION INTRAVENOUS; SUBCUTANEOUS at 13:00

## 2024-03-11 RX ADMIN — CEFEPIME 1000 MG: 1 INJECTION, POWDER, FOR SOLUTION INTRAMUSCULAR; INTRAVENOUS at 15:26

## 2024-03-11 RX ADMIN — METOPROLOL SUCCINATE 50 MG: 50 TABLET, EXTENDED RELEASE ORAL at 13:00

## 2024-03-11 RX ADMIN — WATER 40 MG: 1 INJECTION INTRAMUSCULAR; INTRAVENOUS; SUBCUTANEOUS at 10:23

## 2024-03-11 RX ADMIN — ASPIRIN 81 MG: 81 TABLET, COATED ORAL at 09:27

## 2024-03-11 RX ADMIN — CEFEPIME 1000 MG: 1 INJECTION, POWDER, FOR SOLUTION INTRAMUSCULAR; INTRAVENOUS at 03:06

## 2024-03-11 RX ADMIN — INSULIN LISPRO 1 UNITS: 100 INJECTION, SOLUTION INTRAVENOUS; SUBCUTANEOUS at 18:13

## 2024-03-11 ASSESSMENT — PAIN SCALES - GENERAL
PAINLEVEL_OUTOF10: 0
PAINLEVEL_OUTOF10: 0

## 2024-03-11 ASSESSMENT — PULMONARY FUNCTION TESTS
POST BRONCHODILATOR FEV1/FVC: 50
FEV1 (%PREDICTED): 61

## 2024-03-11 ASSESSMENT — COPD QUESTIONNAIRES: GOLD_GRADE: 2

## 2024-03-11 NOTE — PLAN OF CARE
Problem: Occupational Therapy - Adult  Goal: By Discharge: Performs self-care activities at highest level of function for planned discharge setting.  See evaluation for individualized goals.  Description: FUNCTIONAL STATUS PRIOR TO ADMISSION:  Patient was ambulatory using no AD after recent R lateral malleolus fracture s/p ORIF yet sustained a hardware infection and has been hospitalized and at [facility] for @1 week then developed respiratory failure --tx to [facility]. He was performing simple ADL I to mod I and he and wife were performing I-ADL together.  Receives Help From: Family,  ,  ,  ,  ,  ,  ,  ,  ,  ,       HOME SUPPORT: Patient lived wife wife with wife to provide assistance.    Occupational Therapy Goals:  Initiated 3/11/2024  1.  Patient will perform dynamic sitting balance activities EOB and no posterior LOB with stable vitals in prep for ADL tasks with Stand by Assist within 7 day(s).  2.  Patient will perform grooming with Set-up EOB within 7 day(s).  3.  Patient will perform UB bathing EOB with Contact Guard Assist and Minimal Assist within 7 day(s).  4.  Patient will perform donning underpants with AE use in supine, rolling for pants over hips with Moderate Assist and Additional Time  within 7 day(s).  5.  Patient will perform lateral tranfer from EOB to drop arm BSC with Moderate Assist, Additional Time, and Assist x1 within 7 day(s).  6.  Patient will perform pursed lip breathing without cues Prn during session within 7 days.  7.  Patient will perform B UE theraband HEP focusing on tricep/lat/bicep strength to increase BUE strength for NWB/TTWB R LE functional transfers with SBA within 7 days.  Outcome: Not Progressing    OCCUPATIONAL THERAPY EVALUATION    Patient: Kingsley Wilson (78 y.o. male)  Date: 3/11/2024  Primary Diagnosis: JUDITH (acute kidney injury) ([facility]) [N17.9]  Acute respiratory failure with hypoxia ([facility]) [J96.01]  Severe sepsis ([facility]) [A41.9, R65.20]  Acute hypoxic respiratory failure (HCC)  Jose Alberts Alan M. Jette, -PAC “6-Clicks” Functional Assessment Scores Predict Acute Care Hospital Discharge Destination, Physical Therapy, Volume 94, Issue 9, 1 September 2014, Pages 3966-2815, [email or web address]    Pain Rating:  No c/o/10   Pain Intervention(s):       Activity Tolerance:   Poor, requires rest breaks, requires frequent rest breaks, and desaturates with activity and requires oxygen    After treatment:   Patient left in no apparent distress in bed, Call bell within reach, Bed/ chair alarm activated, Caregiver / family present, Side rails x3, and Heels elevated for pressure relief    COMMUNICATION/EDUCATION:   The patient's plan of care was discussed with: physical therapist and registered nurse    Patient Education  Education Given To: Patient;Family  Education Provided: Role of Therapy;Plan of Care;Precautions;ADL Adaptive Strategies;Equipment;Fall Prevention Strategies  Education Method: Demonstration;Verbal  Barriers to Learning: None  Education Outcome: Verbalized understanding;Continued education needed    Thank you for this referral.  Kathryn Jules OT  Minutes: 44    Occupational Therapy Evaluation Charge Determination   History Examination Decision-Making   HIGH Complexity : Extensive review of history including physical, cognitive and psychocial history  HIGH Complexity: 5 Performance deficits relating to physical, cognitive, or psychosocial skills that result in activity limitations and/or participation restrictions  HIGH Complexity: Patient presents with comorbidities that affect occupational performance.  Significant modifications of tasks or assistance (eg. physical or verbal) with assessment (s) is necessary to enable pt to complete evaluation   Based on the above components, the patient evaluation is determined to be of the following complexity level: High

## 2024-03-11 NOTE — PLAN OF CARE
Problem: Discharge Planning  Goal: Discharge to home or other facility with appropriate resources  Outcome: Progressing     Problem: Skin/Tissue Integrity  Goal: Absence of new skin breakdown  Description: 1.  Monitor for areas of redness and/or skin breakdown  2.  Assess vascular access sites hourly  3.  Every 4-6 hours minimum:  Change oxygen saturation probe site  4.  Every 4-6 hours:  If on nasal continuous positive airway pressure, respiratory therapy assess nares and determine need for appliance change or resting period.  Outcome: Progressing     Problem: Safety - Adult  Goal: Free from fall injury  Outcome: Progressing     Problem: Chronic Conditions and Co-morbidities  Goal: Patient's chronic conditions and co-morbidity symptoms are monitored and maintained or improved  Outcome: Progressing     Problem: Physical Therapy - Adult  Goal: By Discharge: Performs mobility at highest level of function for planned discharge setting.  See evaluation for individualized goals.  Description: FUNCTIONAL STATUS PRIOR TO ADMISSION: At baseline patient is ambulatory.  Has been at [facility] prior to admit and working on lateral transfers.  Was also working on ascending/descending stairs by sitting down on the step.  Was supposed to DC this week from [facility].        HOME SUPPORT PRIOR TO ADMISSION: The patient lived with wife but did not require assistance at baseline.    Physical Therapy Goals  Initiated 3/10/2024  1.  Patient will move from supine to sit and sit to supine in bed with supervision/set-up within 7 day(s).    2.  Patient will perform sit to stand with moderate assistance within 7 day(s).  3.  Patient will transfer from bed to chair and chair to bed with moderate assistance using the least restrictive device within 7 day(s).    3/10/2024 1431 by Hallie Perez, PT  Outcome: Progressing  3/10/2024 1431 by Hallie Perez, PT  Outcome: Progressing

## 2024-03-11 NOTE — CONSULTS
[address]  [address]                              CONSULTATION      PATIENT NAME: SHANI BASURTO              : 1945  MED REC NO: [id number]                       ROOM: 410  ACCOUNT NO: [id number]                       ADMIT DATE: 2024  PROVIDER: Yoel Read MD    DATE OF SERVICE:  2024    ATTENDING PHYSICIAN:  MARÍA ELENA CARDENAS    REASON FOR CONSULTATION:  Seen for  JUDITH.    Thanks for the consult.    HISTORY OF PRESENT ILLNESS:  The patient presents for hypoxia from  [facility].  Blood pressure is acceptable, but oxygen level is low with creatinine up to 3.1 from 2.6 on admission, baseline creatinine 0.6.  Looking at [facility] records, his creatinine started going up to 1.3 on the  and on the 8th to 1.7.  He was on daptomycin and Rocephin for leg infection.  White count is going up.  Renal ultrasound was done on him yesterday, which showed no hydronephrosis.    PAST MEDICAL HISTORY:  Orthopedic surgery infection, TAVR, and CAD.    SOCIAL HISTORY:  Reviewed.    FAMILY HISTORY:  Reviewed.    REVIEW OF SYSTEMS:  As noted in the HPI; otherwise, is negative.    MEDICATIONS:  Inpatient includes Eliquis, aspirin, Zithromax, Rocephin, Neurontin on hold, Solu-Medrol, and vancomycin, IV fluids 100 mL an hour.    ALLERGIES:  TO FIVE MEDICINES.  PLEASE SEE THE LIST.    PHYSICAL EXAMINATION:  GENERAL:  Not in distress.  ABDOMEN:  Distended.  VITAL SIGNS:  Blood pressure is 123/90, satting 93% on 10 L.  EXTREMITIES:  Trace edema covered feet.    LABORATORY DATA:  As follows: Sodium 144, potassium 3.8, BUN 67, creatinine 3.1, calcium is 8.4, anion gap is 8.  ProBNP elevated.  Albumin is 2, globulin level is 4.9, hemoglobin 9, WBC 17.8, platelets 248, and eosinophils 0%.  UA not done.    IMPRESSION:    1. Acute kidney injury in the setting of recent antibiotic treatment, daptomycin and Rocephin, and suspicion of pneumonia.

## 2024-03-11 NOTE — PROGRESS NOTES
[facility]     Hospitalist Progress Note    NAME: Kingsley Wilson   : 1945   MRN: [id number]     Date/Time: 3/11/2024 2:50 PM  Patient PCP: JANUARY Aranda MD    Admission Summary:   Kingsley Wilson is a 78 y.o. male with past medical history significant for type 2 diabetes, diabetic neuropathy, gout, hypertension, dyslipidemia, history of aortic valve stenosis status post TAVR, history of factor V Leiden, COPD with recent right lower extremity DVT on Eliquis, chronic orthopedic hardware infection after open reduction internal fixation of right lateral malleolus fracture who presents to hospital from [facility] rehab for concerns of shortness of breath.  Was reportedly undergoing rehab at [facility].  He has had progressive worsening shortness of breath and today was noted to be hypoxic with O2 sats to 81% on room air.  EMS was activated and he was transported to hospital on BiPAP.     Chart review shows recent admission at [facility] from  to  for cellulitis and infection of right lower extremity wound status post washout and discharged on chronic antibiotics via PICC.  He was discharged on 2 weeks of IV daptomycin and ceftriaxone.     The patient denies any fever, chills, chest or abdominal pain, nausea, vomiting, cough, congestion, recent illness, palpitations, or dysuria.     Remarkable vitals on ER Presentation: Heart rate to 110, SpO2 81% on room air, respiratory rate to 28  Labs Remarkable for: Creatinine 2.68, BNP 2968, WBC 15.6, hemoglobin 9.1, Pro-Luis 4.05, rapid COVID and influenza negative  ER Images: Chest x-ray: Severe bilateral airspace disease.  ER Rx: DuoNebs, Zosyn, vancomycin, 2 L normal saline bolus    Assessment / Plan:     Acute hypoxic respiratory failure secondary to CAP  Severe Sepsis 2/2/ CAP  COPD exacerbation  Leukocytosis--likely sec to steroids  -CXR ASD RUL and LLL  -Blood cultures  jaundice      Central Line: PICC Double Lumen 02/28/24 Right Basilic-Central Line Being Utilized: No      ________________________________________________________________________  Care Plan discussed with:    Comments   Patient x    Family      RN     Care Manager     Consultant                        Multidiciplinary team rounds were held today with , nursing, pharmacist and clinical coordinator.  Patient's plan of care was discussed; medications were reviewed and discharge planning was addressed.     ________________________________________________________________________  Total NON critical care TIME:   35  Minutes    Total CRITICAL CARE TIME Spent:   Minutes non procedure based      Comments   >50% of visit spent in counseling and coordination of care x     This includes time during multidisciplinary rounds if indicated above   ________________________________________________________________________  Hugo Tavarez MD     Procedures: see electronic medical records for all procedures/Xrays and details which were not copied into this note but were reviewed prior to creation of Plan.      LABS:  I reviewed today's most current labs and imaging studies.  Pertinent labs include:  Recent Labs     03/09/24  1127 03/10/24  0418 03/11/24  0549   WBC 15.6* 12.0* 17.8*   HGB 9.1* 9.2* 9.0*   HCT 28.9* 29.3* 28.3*    203 248       Recent Labs     03/09/24  1127 03/10/24  0418 03/11/24  0549    140 144   K 4.2 4.1 3.8    111* 114*   CO2 26 25 22   BUN 39* 48* 67*   MG 2.1  --   --    ALT 16 17  --

## 2024-03-11 NOTE — PLAN OF CARE
Problem: Physical Therapy - Adult  Goal: By Discharge: Performs mobility at highest level of function for planned discharge setting.  See evaluation for individualized goals.  Description: FUNCTIONAL STATUS PRIOR TO ADMISSION: At baseline patient is ambulatory.  Has been at [facility] prior to admit and working on lateral transfers.  Was also working on ascending/descending stairs by sitting down on the step.  Was supposed to DC this week from [facility].        HOME SUPPORT PRIOR TO ADMISSION: The patient lived with wife but did not require assistance at baseline.    Physical Therapy Goals  Initiated 3/10/2024  1.  Patient will move from supine to sit and sit to supine in bed with supervision/set-up within 7 day(s).    2.  Patient will perform sit to stand with moderate assistance within 7 day(s).  3.  Patient will transfer from bed to chair and chair to bed with moderate assistance using the least restrictive device within 7 day(s).    Outcome: Progressing   PHYSICAL THERAPY TREATMENT    Patient: Kingsley Wilson (78 y.o. male)  Date: 3/11/2024  Diagnosis: JUDITH (acute kidney injury) (HCC) [N17.9]  Acute respiratory failure with hypoxia (HCC) [J96.01]  Severe sepsis (HCC) [A41.9, R65.20]  Acute hypoxic respiratory failure (HCC) [J96.01] Acute hypoxic respiratory failure (HCC)      Precautions: Fall Risk (TTWB RLE when transitioning around out of his wheelchair but he should remain nonweightbearing otherwise, taken from ortho note) and RUE limb alert    ASSESSMENT:  Patient continues to benefit from skilled PT services and is slowly progressing towards goals. Pt remains limited by decreased activity tolerance. He was received on high flow at 10 liters at resting Sp02 was 92%. After coming to sitting at the EOB he desat to 84% and was unable to recover using just pursed lip breathing and had to increase his 02. The particular flow meter/regulator head in use in his room increased from 10 directly to 15 liters. After seated rest at

## 2024-03-11 NOTE — CONSULTS
Seen and examined  Thanks for the consult  A/P:  JUDITH diff includes:1- septic ATN,PNA                              2-AIN(was on rocephin..)                               3-rhabdo/was on dapromycin with recent CK ok  Foot infection  PNA,  Obesity    Check CK  Urine studies  Decrease IVF  Will follow  Discussed with him and wife

## 2024-03-11 NOTE — PROGRESS NOTES
03/11/24 1137 03/11/24 1143 03/11/24 1148   Vitals   Pulse (!) 102 (!) 109  --    Heart Rate Source Monitor;Brachial  --   --    /76 127/89  --    MAP (Calculated) 97 102  --    BP Location Left upper arm Left upper arm  --    BP Method Automatic Automatic  --    Patient Position Semi fowlers Sitting  (EOB) Sitting  (EOB)   SpO2 92 % (!) 84 % 90 %   O2 Flow Rate (L/min) 10 L/min 10 L/min 15 L/min   O2 Device High flow nasal cannula  (10L) High flow nasal cannula High flow nasal cannula

## 2024-03-11 NOTE — PROGRESS NOTES
Infectious Disease Progress Note       Subjective:      Afebrile overnight tolerating PO diet no witnessed aspiration episodes but one time was slumped over to his right side.  Mild wheezes.  Remains on O2.            Objective:    Vitals:   Patient Vitals for the past 24 hrs:   Temp Pulse Resp BP SpO2   03/11/24 0915 97.6 °F (36.4 °C) 99 27 (!) 123/90 93 %   03/11/24 0706 97.8 °F (36.6 °C) 100 23 138/75 94 %   03/11/24 0400 97 °F (36.1 °C) (!) 104 11 135/78 93 %   03/11/24 0300 -- 98 11 -- --   03/11/24 0148 -- (!) 106 -- -- --   03/11/24 0000 97.7 °F (36.5 °C) (!) 108 26 106/76 93 %   03/10/24 2158 -- (!) 112 -- -- --   03/10/24 2113 -- -- -- -- 93 %   03/10/24 2112 -- -- -- -- (!) 89 %   03/10/24 2111 -- (!) 109 16 -- 91 %   03/10/24 2103 -- -- -- -- (!) 89 %   03/10/24 2100 -- (!) 110 20 134/71 --   03/10/24 2040 -- (!) 107 17 -- 96 %   03/10/24 2000 -- (!) 107 -- -- --   03/10/24 1944 97.9 °F (36.6 °C) (!) 109 16 130/70 --   03/10/24 1800 -- (!) 110 -- -- --   03/10/24 1622 -- 96 16 -- 95 %   03/10/24 1400 -- (!) 101 -- -- --   03/10/24 1200 -- 90 -- -- --   03/10/24 1000 -- 88 -- -- --       Physical Exam:  Gen: No apparent distress  HEENT:  Normocephalic, atraumatic, MMM  CV: borderline tachycardia  Lungs: exp mild wheezes and insp crackles  Abdomen: soft, non-distended  Genitourinary:  no baez catheter   Skin: venous stasis dermatitis b/l lower extremities, healing surgical sites RLE   Psych: good affect, good eye contact, non tearful  Neuro: alert, oriented to time,  place, and situation, moves all extremities to commands, verbal  Musculoskeletal:  No joint edema, erythema or tenderness noted   Lines: PICC RUE clean, PIV LUE clean    Medications:    Current Facility-Administered Medications:     ipratropium 0.5 mg-albuterol 2.5 mg (DUONEB) nebulizer solution 1 Dose, 1 Dose, Inhalation, Q4H PRN, Jose Carpenter MD    methylPREDNISolone sodium succ (SOLU-MEDROL) 40 mg in sterile water 1 mL injection, 40

## 2024-03-11 NOTE — CARE COORDINATION
Care Management Initial Assessment       RUR:25%  Readmission? Yes   1st IM letter given? Yes   1st  letter given: No      03/11/24 1140   Service Assessment   Patient Orientation Alert and Oriented;Person;Place;Situation   Cognition Alert   History Provided By Patient;Spouse;Medical Record   Support Systems Spouse/Significant Other   PCP Verified by CM Yes   Last Visit to PCP Within last 6 months   Prior Functional Level Assistance with the following:;Bathing;Dressing;Toileting;Mobility   Current Functional Level Assistance with the following:;Bathing;Dressing;Toileting;Mobility   Can patient return to prior living arrangement Unknown at present   Ability to make needs known: Good   Family able to assist with home care needs: Yes   Would you like for me to discuss the discharge plan with any other family members/significant others, and if so, who? No     CM met with this pt and his wife at his bedside. CM introduced them to the role of CM and transition of care. This pt was at [facility] and was there for about a week when he was re-admitted to [facility]. He wishes to return to [facility] at d/c. CM sent a referral to [facility] via [facility].This pt and his wife are planning to move to [facility] soon.Will continue to follow. ALEXANDRA Leonard,ACM-SW

## 2024-03-11 NOTE — CARE COORDINATION
03/11/24 1303   Readmission Assessment   Number of Days since last admission? 8-30 days   Previous Disposition Acute Rehab   Who is being Interviewed Patient;Caregiver   What was the patient's/caregiver's perception as to why they think they needed to return back to the hospital? Other (Comment)  (It was medically necessary for pt to be re-admitted.)   Did you visit your Primary Care Physician after you left the hospital, before you returned this time? No   Why weren't you able to visit your PCP? Other (Comment)  (Pt was re-admitted to the hospital.)   Did you see a specialist, such as Cardiac, Pulmonary, Orthopedic Physician, etc. after you left the hospital? No   Who advised the patient to return to the hospital? Acute Rehab   Does the patient report anything that got in the way of taking their medications? No   In our efforts to provide the best possible care to you and others like you, can you think of anything that we could have done to help you after you left the hospital the first time, so that you might not have needed to return so soon? Other (Comment)  (Pt was re-admitted.)     Evelia Patiño,ALEXANDRA,ACM-SW

## 2024-03-12 ENCOUNTER — APPOINTMENT (OUTPATIENT)
Facility: HOSPITAL | Age: 79
End: 2024-03-12
Payer: MEDICARE

## 2024-03-12 ENCOUNTER — APPOINTMENT (OUTPATIENT)
Facility: HOSPITAL | Age: 79
End: 2024-03-12
Attending: HOSPITALIST
Payer: MEDICARE

## 2024-03-12 LAB
ANION GAP SERPL CALC-SCNC: 6 MMOL/L (ref 5–15)
ARTERIAL PATENCY WRIST A: POSITIVE
BASE DEFICIT BLD-SCNC: 6.3 MMOL/L
BDY SITE: ABNORMAL
BUN SERPL-MCNC: 79 MG/DL (ref 6–20)
BUN/CREAT SERPL: 24 (ref 12–20)
CALCIUM SERPL-MCNC: 9 MG/DL (ref 8.5–10.1)
CHLORIDE SERPL-SCNC: 116 MMOL/L (ref 97–108)
CO2 SERPL-SCNC: 21 MMOL/L (ref 21–32)
CREAT SERPL-MCNC: 3.31 MG/DL (ref 0.7–1.3)
ECHO BSA: 2.23 M2
EKG ATRIAL RATE: 117 BPM
EKG DIAGNOSIS: NORMAL
EKG P AXIS: 53 DEGREES
EKG P-R INTERVAL: 168 MS
EKG Q-T INTERVAL: 310 MS
EKG QRS DURATION: 86 MS
EKG QTC CALCULATION (BAZETT): 432 MS
EKG R AXIS: 67 DEGREES
EKG T AXIS: 47 DEGREES
EKG VENTRICULAR RATE: 117 BPM
GAS FLOW.O2 O2 DELIVERY SYS: ABNORMAL
GAS FLOW.O2 SETTING OXYMISER: 30 BPM
GLUCOSE BLD STRIP.AUTO-MCNC: 184 MG/DL (ref 65–117)
GLUCOSE BLD STRIP.AUTO-MCNC: 211 MG/DL (ref 65–117)
GLUCOSE BLD STRIP.AUTO-MCNC: 245 MG/DL (ref 65–117)
GLUCOSE BLD STRIP.AUTO-MCNC: 72 MG/DL (ref 65–117)
GLUCOSE SERPL-MCNC: 197 MG/DL (ref 65–100)
HCO3 BLD-SCNC: 19 MMOL/L (ref 22–26)
O2/TOTAL GAS SETTING VFR VENT: 15 %
PCO2 BLD: 36.3 MMHG (ref 35–45)
PH BLD: 7.33 (ref 7.35–7.45)
PO2 BLD: 59 MMHG (ref 80–100)
POTASSIUM SERPL-SCNC: 4.3 MMOL/L (ref 3.5–5.1)
SAO2 % BLD: 88.2 % (ref 92–97)
SERVICE CMNT-IMP: ABNORMAL
SERVICE CMNT-IMP: NORMAL
SODIUM SERPL-SCNC: 143 MMOL/L (ref 136–145)
SPECIMEN TYPE: ABNORMAL
VANCOMYCIN SERPL-MCNC: 16.8 UG/ML

## 2024-03-12 PROCEDURE — 6360000002 HC RX W HCPCS: Performed by: INTERNAL MEDICINE

## 2024-03-12 PROCEDURE — 87205 SMEAR GRAM STAIN: CPT

## 2024-03-12 PROCEDURE — 93010 ELECTROCARDIOGRAM REPORT: CPT | Performed by: INTERNAL MEDICINE

## 2024-03-12 PROCEDURE — 6360000002 HC RX W HCPCS: Performed by: HOSPITALIST

## 2024-03-12 PROCEDURE — 82962 GLUCOSE BLOOD TEST: CPT

## 2024-03-12 PROCEDURE — 36600 WITHDRAWAL OF ARTERIAL BLOOD: CPT

## 2024-03-12 PROCEDURE — 6370000000 HC RX 637 (ALT 250 FOR IP): Performed by: INTERNAL MEDICINE

## 2024-03-12 PROCEDURE — 36415 COLL VENOUS BLD VENIPUNCTURE: CPT

## 2024-03-12 PROCEDURE — 94640 AIRWAY INHALATION TREATMENT: CPT

## 2024-03-12 PROCEDURE — 6360000002 HC RX W HCPCS: Performed by: STUDENT IN AN ORGANIZED HEALTH CARE EDUCATION/TRAINING PROGRAM

## 2024-03-12 PROCEDURE — 80202 ASSAY OF VANCOMYCIN: CPT

## 2024-03-12 PROCEDURE — 2580000003 HC RX 258: Performed by: INTERNAL MEDICINE

## 2024-03-12 PROCEDURE — 82803 BLOOD GASES ANY COMBINATION: CPT

## 2024-03-12 PROCEDURE — 2580000003 HC RX 258: Performed by: STUDENT IN AN ORGANIZED HEALTH CARE EDUCATION/TRAINING PROGRAM

## 2024-03-12 PROCEDURE — 71045 X-RAY EXAM CHEST 1 VIEW: CPT

## 2024-03-12 PROCEDURE — 6370000000 HC RX 637 (ALT 250 FOR IP): Performed by: EMERGENCY MEDICINE

## 2024-03-12 PROCEDURE — 2060000000 HC ICU INTERMEDIATE R&B

## 2024-03-12 PROCEDURE — 94760 N-INVAS EAR/PLS OXIMETRY 1: CPT

## 2024-03-12 PROCEDURE — 2700000000 HC OXYGEN THERAPY PER DAY

## 2024-03-12 PROCEDURE — 80048 BASIC METABOLIC PNL TOTAL CA: CPT

## 2024-03-12 PROCEDURE — 6370000000 HC RX 637 (ALT 250 FOR IP): Performed by: STUDENT IN AN ORGANIZED HEALTH CARE EDUCATION/TRAINING PROGRAM

## 2024-03-12 PROCEDURE — 87070 CULTURE OTHR SPECIMN AEROBIC: CPT

## 2024-03-12 PROCEDURE — 93971 EXTREMITY STUDY: CPT

## 2024-03-12 PROCEDURE — 6370000000 HC RX 637 (ALT 250 FOR IP): Performed by: HOSPITALIST

## 2024-03-12 RX ORDER — AMLODIPINE BESYLATE 5 MG/1
10 TABLET ORAL DAILY
Status: DISCONTINUED | OUTPATIENT
Start: 2024-03-12 | End: 2024-03-15 | Stop reason: HOSPADM

## 2024-03-12 RX ORDER — MORPHINE SULFATE 2 MG/ML
2 INJECTION, SOLUTION INTRAMUSCULAR; INTRAVENOUS EVERY 4 HOURS PRN
Status: DISCONTINUED | OUTPATIENT
Start: 2024-03-12 | End: 2024-03-15

## 2024-03-12 RX ORDER — IPRATROPIUM BROMIDE AND ALBUTEROL SULFATE 2.5; .5 MG/3ML; MG/3ML
1 SOLUTION RESPIRATORY (INHALATION)
Status: DISCONTINUED | OUTPATIENT
Start: 2024-03-12 | End: 2024-03-14

## 2024-03-12 RX ADMIN — NYSTATIN 500000 UNITS: 100000 SUSPENSION ORAL at 16:35

## 2024-03-12 RX ADMIN — CEFEPIME 1000 MG: 1 INJECTION, POWDER, FOR SOLUTION INTRAMUSCULAR; INTRAVENOUS at 03:30

## 2024-03-12 RX ADMIN — METOPROLOL SUCCINATE 50 MG: 50 TABLET, EXTENDED RELEASE ORAL at 08:54

## 2024-03-12 RX ADMIN — SODIUM CHLORIDE: 9 INJECTION, SOLUTION INTRAVENOUS at 16:45

## 2024-03-12 RX ADMIN — WATER 80 MG: 1 INJECTION INTRAMUSCULAR; INTRAVENOUS; SUBCUTANEOUS at 16:36

## 2024-03-12 RX ADMIN — AZITHROMYCIN MONOHYDRATE 500 MG: 500 INJECTION, POWDER, LYOPHILIZED, FOR SOLUTION INTRAVENOUS at 14:08

## 2024-03-12 RX ADMIN — IPRATROPIUM BROMIDE AND ALBUTEROL SULFATE 1 DOSE: .5; 3 SOLUTION RESPIRATORY (INHALATION) at 14:21

## 2024-03-12 RX ADMIN — NYSTATIN 500000 UNITS: 100000 SUSPENSION ORAL at 21:31

## 2024-03-12 RX ADMIN — APIXABAN 5 MG: 5 TABLET, FILM COATED ORAL at 08:54

## 2024-03-12 RX ADMIN — SODIUM CHLORIDE, PRESERVATIVE FREE 10 ML: 5 INJECTION INTRAVENOUS at 08:56

## 2024-03-12 RX ADMIN — IPRATROPIUM BROMIDE AND ALBUTEROL SULFATE 1 DOSE: .5; 3 SOLUTION RESPIRATORY (INHALATION) at 04:24

## 2024-03-12 RX ADMIN — MORPHINE SULFATE 2 MG: 2 INJECTION, SOLUTION INTRAMUSCULAR; INTRAVENOUS at 15:35

## 2024-03-12 RX ADMIN — AMLODIPINE BESYLATE 10 MG: 5 TABLET ORAL at 13:59

## 2024-03-12 RX ADMIN — Medication 6 MG: at 22:41

## 2024-03-12 RX ADMIN — SODIUM CHLORIDE: 9 INJECTION, SOLUTION INTRAVENOUS at 14:04

## 2024-03-12 RX ADMIN — NORTRIPTYLINE HYDROCHLORIDE 50 MG: 25 CAPSULE ORAL at 21:31

## 2024-03-12 RX ADMIN — IPRATROPIUM BROMIDE AND ALBUTEROL SULFATE 1 DOSE: .5; 3 SOLUTION RESPIRATORY (INHALATION) at 20:45

## 2024-03-12 RX ADMIN — ACETAMINOPHEN 650 MG: 325 TABLET ORAL at 08:55

## 2024-03-12 RX ADMIN — Medication 1 CAPSULE: at 08:54

## 2024-03-12 RX ADMIN — MEROPENEM 500 MG: 500 INJECTION, POWDER, FOR SOLUTION INTRAVENOUS at 16:46

## 2024-03-12 RX ADMIN — APIXABAN 5 MG: 5 TABLET, FILM COATED ORAL at 21:30

## 2024-03-12 RX ADMIN — INSULIN LISPRO 1 UNITS: 100 INJECTION, SOLUTION INTRAVENOUS; SUBCUTANEOUS at 12:42

## 2024-03-12 RX ADMIN — IPRATROPIUM BROMIDE AND ALBUTEROL SULFATE 1 DOSE: .5; 3 SOLUTION RESPIRATORY (INHALATION) at 12:17

## 2024-03-12 RX ADMIN — NYSTATIN 500000 UNITS: 100000 SUSPENSION ORAL at 12:42

## 2024-03-12 RX ADMIN — POLYETHYLENE GLYCOL 3350 17 G: 17 POWDER, FOR SOLUTION ORAL at 15:35

## 2024-03-12 RX ADMIN — WATER 40 MG: 1 INJECTION INTRAMUSCULAR; INTRAVENOUS; SUBCUTANEOUS at 08:54

## 2024-03-12 RX ADMIN — ASPIRIN 81 MG: 81 TABLET, COATED ORAL at 08:55

## 2024-03-12 RX ADMIN — MONTELUKAST 10 MG: 10 TABLET, FILM COATED ORAL at 21:31

## 2024-03-12 RX ADMIN — IPRATROPIUM BROMIDE AND ALBUTEROL SULFATE 1 DOSE: .5; 3 SOLUTION RESPIRATORY (INHALATION) at 07:20

## 2024-03-12 RX ADMIN — SODIUM CHLORIDE, PRESERVATIVE FREE 10 ML: 5 INJECTION INTRAVENOUS at 21:33

## 2024-03-12 ASSESSMENT — PAIN DESCRIPTION - ORIENTATION: ORIENTATION: RIGHT;LEFT

## 2024-03-12 ASSESSMENT — PAIN DESCRIPTION - LOCATION: LOCATION: FOOT

## 2024-03-12 ASSESSMENT — PAIN DESCRIPTION - DESCRIPTORS: DESCRIPTORS: ACHING

## 2024-03-12 ASSESSMENT — PAIN SCALES - GENERAL: PAINLEVEL_OUTOF10: 6

## 2024-03-12 NOTE — PROGRESS NOTES
Infectious Disease Progress Note       Subjective:     Feels SOB and not improving with chest tightness.  Making urine OK.  Tolerating PO diet.  Still coughing not quite productive.    Objective:    Vitals:   Patient Vitals for the past 24 hrs:   Temp Pulse Resp BP SpO2   03/12/24 0854 -- 97 -- (!) 158/89 --   03/12/24 0728 -- (!) 109 21 -- 94 %   03/12/24 0723 -- (!) 104 30 -- 91 %   03/12/24 0600 -- (!) 107 -- -- --   03/12/24 0400 -- (!) 106 -- -- --   03/12/24 0323 98.1 °F (36.7 °C) (!) 109 25 (!) 165/85 94 %   03/12/24 0200 -- (!) 111 -- -- --   03/11/24 2302 98.2 °F (36.8 °C) (!) 112 24 (!) 178/91 --   03/11/24 2200 -- (!) 109 -- -- --   03/11/24 2000 98.5 °F (36.9 °C) (!) 108 20 (!) 167/95 --   03/11/24 1914 97.7 °F (36.5 °C) (!) 109 22 (!) 153/92 97 %   03/11/24 1800 -- (!) 113 -- -- --   03/11/24 1515 97.5 °F (36.4 °C) (!) 102 19 (!) 157/82 99 %   03/11/24 1359 -- (!) 104 -- -- --   03/11/24 1300 97.8 °F (36.6 °C) (!) 105 20 (!) 152/81 92 %   03/11/24 1200 -- (!) 106 -- -- --   03/11/24 1148 -- -- -- -- 90 %   03/11/24 1143 -- (!) 109 -- 127/89 (!) 84 %   03/11/24 1137 -- (!) 102 -- 139/76 92 %   03/11/24 1100 97.7 °F (36.5 °C) (!) 102 21 136/86 93 %         Physical Exam:  Gen: No apparent distress  HEENT:  MMM, + oral thrush, mild  CV: tachycardia  Lungs: exp mild wheezes and insp crackles  Abdomen: soft, non-distended  Genitourinary:  no baez catheter   Skin: venous stasis dermatitis b/l lower extremities, healing surgical sites RLE   Psych: good affect, good eye contact, non tearful  Neuro: alert, oriented to time,  talkative, pleasant  Musculoskeletal:  No joint edema, erythema or tenderness noted   Lines: PICC RUE clean, PIV LUE clean    Medications:    Current Facility-Administered Medications:     nystatin (MYCOSTATIN) 980678 UNIT/ML suspension 500,000 Units, 5 mL, Oral, 4x Daily, Alberto Samuel MD    ipratropium 0.5 mg-albuterol 2.5 mg (DUONEB) nebulizer solution 1 Dose, 1 Dose, Inhalation,  MD    0.9 % sodium chloride infusion, , IntraVENous, PRN, Jasvir Leos MD, Last Rate: 5 mL/hr at 03/11/24 1258, New Bag at 03/11/24 1258    ondansetron (ZOFRAN-ODT) disintegrating tablet 4 mg, 4 mg, Oral, Q8H PRN **OR** ondansetron (ZOFRAN) injection 4 mg, 4 mg, IntraVENous, Q6H PRN, Jasvir Leos MD    polyethylene glycol (GLYCOLAX) packet 17 g, 17 g, Oral, Daily PRN, Jasvir Leos MD    acetaminophen (TYLENOL) tablet 650 mg, 650 mg, Oral, Q6H PRN, 650 mg at 03/12/24 0855 **OR** acetaminophen (TYLENOL) suppository 650 mg, 650 mg, Rectal, Q6H PRN, Jasvir Leos MD    [COMPLETED] ceFEPIme (MAXIPIME) 2,000 mg in sterile water 20 mL IV syringe, 2,000 mg, IntraVENous, Once, 2,000 mg at 03/09/24 1556 **FOLLOWED BY** cefepime (MAXIPIME) 1,000 mg in sodium chloride 0.9 % 50 mL IVPB (mini-bag), 1,000 mg, IntraVENous, Q12H, Jasvir Leos MD, Stopped at 03/12/24 0800    [Held by provider] gabapentin (NEURONTIN) tablet 300 mg, 300 mg, Oral, BID, Jasvir Leos MD, 300 mg at 03/10/24 0913      Labs:  Recent Results (from the past 24 hour(s))   POCT Glucose    Collection Time: 03/11/24 12:32 PM   Result Value Ref Range    POC Glucose 246 (H) 65 - 117 mg/dL    Performed by: DANIEL Huston RN    Urinalysis with Microscopic    Collection Time: 03/11/24  3:06 PM   Result Value Ref Range    Color, UA YELLOW/STRAW      Appearance CLOUDY (A) CLEAR      Specific Gravity, UA 1.016 1.003 - 1.030      pH, Urine 5.0 5.0 - 8.0      Protein, UA 30 (A) NEG mg/dL    Glucose, UA Negative NEG mg/dL    Ketones, Urine Negative NEG mg/dL    Bilirubin Urine Negative NEG      Blood, Urine LARGE (A) NEG      Urobilinogen, Urine 0.2 0.2 - 1.0 EU/dL    Nitrite, Urine Negative NEG      Leukocyte Esterase, Urine Negative NEG      WBC, UA 0-4 0 - 4 /hpf    RBC, UA 10-20 0 - 5 /hpf    Epithelial Cells UA FEW FEW /lpf    BACTERIA, URINE Negative NEG /hpf    Mucus, UA TRACE (A) NEG /lpf    Amorphous Crystal 1+ (A) NEG   Sodium,

## 2024-03-12 NOTE — PROGRESS NOTES
03/12/24 0723   Oxygen Therapy/Pulse Ox   O2 Therapy Oxygen humidified   O2 Device Nasal cannula   O2 Flow Rate (L/min) 6 L/min   Pulse (!) 104   Respirations 30   SpO2 91 %     Placing patient on high flow 15 liters will wean as tolerate

## 2024-03-12 NOTE — PROGRESS NOTES
PICC line dressing change per protocol. AC measures 36 cm, baseline AC on insertion 34 cm. Patient reports right arm feels swollen. Left AC measures 31 cm. Dr Crain made aware. Ordered duplex RAUL Caceres RN made aware.

## 2024-03-12 NOTE — CONSULTS
PULMONARY/CRITICAL CARE/SLEEP MEDICINE    Initial Physician Consultation Note    Name: Kingsley Wilson   : 1945   MRN: [id number]   Date: 3/12/2024      Subjective:   Consult Note: 3/12/2024     This patient has been seen and evaluated at the request of Dr. Tavarez for respiratory failure. Medical records and data reviewed.  Patient is a 78 y.o. male who presented to the hospital with complaints of shortness of breath from inpatient rehab.   Patient was hospitalized at [facility] and discharged on  after treatment of cellulitis and infection of lower extremity wound after receiving a washout procedure.  He was sent home with a PICC line on 2 weeks of IV daptomycin and ceftriaxone.  PICC line was inserted for IV antibiotics as an outpatient.  X-ray from the day of PICC line insertion on  cannot be pulled up for review.  He was subsequently admitted on the  with worsening dyspnea and cough.  X-ray on admission showed multifocal infiltrates with dense consolidation of right upper lobe.  X-ray this morning has shown evolution with consolidative airspace disease in the left mid lung fields as well.  He has had worsening oxygenation and is requiring high flow nasal oxygen.  ABG that was done did not show presence of respiratory acidosis and metabolic acidosis from underlying renal failure was noted.  Peripheral eosinophil count is not elevated.  He has had streaking hemoptysis.  He is currently therapeutically anticoagulated.    He quit smoking 28 years ago.  He does not have a history of chronic lung disease.  He has had childhood asthma and seasonal allergies for which she has been receiving montelukast.  He is not followed in our office on a regular basis    Assessment:     Acute hypoxic respiratory failure  Multilobar infiltrates, differential diagnosis includes acute infectious healthcare associated pneumonia pneumonia given recent hospitalization.  He is at risk  sequelae of aspiration. Follow-up radiographs are recommended to ensure clearing. The left lung is clear of consolidation. Minimal hypoventilatory changes are noted in the lung bases. There is no pulmonary edema. Pleura:  No pleural effusion. No pneumothorax. Osseous structures: A mid shaft fracture of the left humerus which has healed with deformity is noted. There are no acute osseous findings. Remainder stable. Dictated By: Braeden Ribeiro Electronically Verified by: Braeden Ribeiro 3/7/2024 10:35 AM      Encounter Date: 03/09/24   EKG 12 Lead   Result Value    Ventricular Rate 117    Atrial Rate 117    P-R Interval 168    QRS Duration 86    Q-T Interval 310    QTc Calculation (Bazett) 432    P Axis 53    R Axis 67    T Axis 47    Diagnosis      Sinus tachycardia      Confirmed by JAS Smith Lit ([id number]) on 3/12/2024 8:22:08 AM          Tele- reviewed    Medical decision making:   I have reviewed the flowsheet and previous day's notes  Patient has acute or chronic illness that poses a threat to life or bodily function  Review and order of Clinical lab tests  Review and Order of Radiology tests  Independent visualization of Image  Reviewed Oxygen/ NiPPV  High Risk Drug therapy requiring intensive monitoring for toxicity: eg steroids, pressors, antibiotics  CCT 75'    Thank you for allowing me to participate in this patient's care.    MD Radha De Souza MD, [facility]  [facility]

## 2024-03-12 NOTE — PROGRESS NOTES
Referral source:   Kingsley Wilson at [facility] in [facility] 4 IM.  attended rounds in the IMCU as part of the Interdisciplinary team where the patient's ongoing care was discussed. I reviewed the medical record as part of this encounter.     Outcome: Interdisciplinary team are aware of  availability and were encouraged to request support as needed.      Advised nurse to contact [facility] for any further referrals.The  on-call can be reached at [phone]).     Rev. Barbara Hernandez MDiv, [facility]  Staff

## 2024-03-12 NOTE — PROGRESS NOTES
03/12/24 0728   Oxygen Therapy/Pulse Ox   O2 Therapy Oxygen humidified   O2 Device High flow nasal cannula   O2 Flow Rate (L/min) 15 L/min   Pulse (!) 109   Respirations 21   SpO2 94 %     Patient moves hand around a lot, with a good pleth SPO2 89 -94%  Patient congested and do have moments that saturations will dip into the 80's.

## 2024-03-12 NOTE — PROGRESS NOTES
03/12/24 1244   Oxygen Therapy/Pulse Ox   O2 Therapy Oxygen humidified   O2 Device Heated high flow cannula   O2 Flow Rate (L/min) 45 L/min   FiO2  93 %   Pulse 100   Respirations 23   SpO2 94 %     Per ABG, per MD

## 2024-03-12 NOTE — PROGRESS NOTES
RENAL  PROGRESS NOTE        Subjective:   SOB,one episode of hemoptysis    Objective:   VITALS SIGNS:    BP (!) 158/89   Pulse 97   Temp 98.1 °F (36.7 °C) (Oral)   Resp 21   Ht 1.854 m (6' 1\")   Wt 98.5 kg (217 lb 2.5 oz)   SpO2 94%   BMI 28.65 kg/m²             Temp (24hrs), Av.9 °F (36.6 °C), Min:97.5 °F (36.4 °C), Max:98.5 °F (36.9 °C)         PHYSICAL EXAM:  Abd distended    DATA REVIEW:     INTAKE / OUTPUT:   Last shift:      No intake/output data recorded.  Last 3 shifts: 03/10 1901 -  0700  In: 3096.5 [I.V.:1945.8]  Out: 1450 [Urine:1450]    Intake/Output Summary (Last 24 hours) at 3/12/2024 0904  Last data filed at 3/11/2024 2346  Gross per 24 hour   Intake 1896.52 ml   Output 850 ml   Net 1046.52 ml         LABS:   LABS:  Recent Labs     24  0414 24  0549 03/10/24  0418 24  1127 24  0459 24  0230 24  0204    144 140 138 141 138 140   K 4.3 3.8 4.1 4.2 4.0 3.6 4.0   * 114* 111* 108 106 105 109*   CO2 21 22 25 26 31 30 28   BUN 79* 67* 48* 39* 12 11 15   CREATININE 3.31* 3.19* 2.84* 2.68* 0.66* 0.65* 0.67*   CALCIUM 9.0 8.4* 8.8 8.5 9.1 8.6 8.6   LABALBU  --   --  2.0* 2.0* 2.6* 2.5* 2.3*   PHOS  --   --   --   --  3.1 2.6 2.4*   MG  --   --   --  2.1 1.6 1.6 1.6     Recent Labs     24  0549 03/10/24  0418 24  1127   WBC 17.8* 12.0* 15.6*   HGB 9.0* 9.2* 9.1*   HCT 28.3* 29.3* 28.9*    203 197     No results for input(s): \"MARIS\", \"KU\", \"CLU\", \"CREAU\" in the last 720 hours.    Invalid input(s): \"PROU\"      Assessment:       Seen and examined  Thanks for the consult  A/P:  JUDITH  2nd septic ATN,PNA;to note one episode of hemoptysis(after + cough)   microhematuria                              Foot infection  PNA,  Obesity           Non oliguric   Creat slightly up  If more hemoptysis will send ANCA,ant-GBM  Discussed with him  Yoel Read MD

## 2024-03-12 NOTE — PROGRESS NOTES
OT services deferred - Patient is not cleared for therapy this afternoon due worsening respiratory status. He desat while working with nursing on clean up and repositioning in bed,  was placed on heated high flow at 45 liters/minute and 93% Fi02.   OT POC unchanged at this time.   Beatrice Olivo, OTR/L

## 2024-03-12 NOTE — PROGRESS NOTES
[facility]     Hospitalist Progress Note    NAME: Kingsley Wilson   : 1945   MRN: [id number]     Date/Time: 3/12/2024 4:46 PM  Patient PCP: JANUARY Aranda MD    Admission Summary:   Kingsley Wilson is a 78 y.o. male with past medical history significant for type 2 diabetes, diabetic neuropathy, gout, hypertension, dyslipidemia, history of aortic valve stenosis status post TAVR, history of factor V Leiden, COPD with recent right lower extremity DVT on Eliquis, chronic orthopedic hardware infection after open reduction internal fixation of right lateral malleolus fracture who presents to hospital from [facility] rehab for concerns of shortness of breath.  Was reportedly undergoing rehab at [facility].  He has had progressive worsening shortness of breath and today was noted to be hypoxic with O2 sats to 81% on room air.  EMS was activated and he was transported to hospital on BiPAP.     Chart review shows recent admission at [facility] from  to  for cellulitis and infection of right lower extremity wound status post washout and discharged on chronic antibiotics via PICC.  He was discharged on 2 weeks of IV daptomycin and ceftriaxone.     The patient denies any fever, chills, chest or abdominal pain, nausea, vomiting, cough, congestion, recent illness, palpitations, or dysuria.     Remarkable vitals on ER Presentation: Heart rate to 110, SpO2 81% on room air, respiratory rate to 28  Labs Remarkable for: Creatinine 2.68, BNP 2968, WBC 15.6, hemoglobin 9.1, Pro-Luis 4.05, rapid COVID and influenza negative  ER Images: Chest x-ray: Severe bilateral airspace disease.  ER Rx: DuoNebs, Zosyn, vancomycin, 2 L normal saline bolus    Assessment / Plan:     Acute hypoxic respiratory failure secondary to CAP vs daptomycin induced  Severe Sepsis 2/2/ CAP  COPD exacerbation  Leukocytosis--likely sec to steroids  -CXR ASD RUL and

## 2024-03-12 NOTE — PROGRESS NOTES
Chart checked, case discussed with pt's nurse. Pt is not cleared for therapy at this time. He desat while working with nursing on clean up and repositioning in bed and was placed on heated high flow at 45 liters/minute and 93% Fi02. PT will follow and see as able and appropriate. Swapna Carlin, PT

## 2024-03-12 NOTE — PROGRESS NOTES
Day #1 of Meropenem  Indication:  Aspiration PNA  Current regimen:  1 gram IV Q 12 hours    Recent Labs     03/10/24  0418 24  0549 24  0414   WBC 12.0* 17.8*  --    CREATININE 2.84* 3.19* 3.31*   BUN 48* 67* 79*     Est CrCl: 23 ml/min  Temp (24hrs), Av °F (36.7 °C), Min:97.6 °F (36.4 °C), Max:98.5 °F (36.9 °C)    Plan: Change to 500 mg IV Q 12 hours per [facility] P&T Committee Protocol with respect to renal function.  Pharmacy will continue to monitor patient daily and will make dosage adjustments based upon changing renal function.

## 2024-03-13 ENCOUNTER — APPOINTMENT (OUTPATIENT)
Facility: HOSPITAL | Age: 79
End: 2024-03-13
Payer: MEDICARE

## 2024-03-13 LAB
ANION GAP SERPL CALC-SCNC: 6 MMOL/L (ref 5–15)
BASE DEFICIT BLD-SCNC: 5.5 MMOL/L
BUN SERPL-MCNC: 81 MG/DL (ref 6–20)
BUN/CREAT SERPL: 25 (ref 12–20)
CALCIUM SERPL-MCNC: 8.5 MG/DL (ref 8.5–10.1)
CHLORIDE SERPL-SCNC: 115 MMOL/L (ref 97–108)
CO2 SERPL-SCNC: 18 MMOL/L (ref 21–32)
CREAT SERPL-MCNC: 3.22 MG/DL (ref 0.7–1.3)
ERYTHROCYTE [DISTWIDTH] IN BLOOD BY AUTOMATED COUNT: 14.9 % (ref 11.5–14.5)
GLUCOSE BLD STRIP.AUTO-MCNC: 171 MG/DL (ref 65–117)
GLUCOSE BLD STRIP.AUTO-MCNC: 205 MG/DL (ref 65–117)
GLUCOSE BLD STRIP.AUTO-MCNC: 217 MG/DL (ref 65–117)
GLUCOSE BLD STRIP.AUTO-MCNC: 242 MG/DL (ref 65–117)
GLUCOSE SERPL-MCNC: 220 MG/DL (ref 65–100)
HCO3 BLD-SCNC: 20.1 MMOL/L (ref 22–26)
HCT VFR BLD AUTO: 31.4 % (ref 36.6–50.3)
HGB BLD-MCNC: 9.9 G/DL (ref 12.1–17)
L PNEUMO1 AG UR QL IA: NEGATIVE
MCH RBC QN AUTO: 28.9 PG (ref 26–34)
MCHC RBC AUTO-ENTMCNC: 31.5 G/DL (ref 30–36.5)
MCV RBC AUTO: 91.8 FL (ref 80–99)
NRBC # BLD: 0 K/UL (ref 0–0.01)
NRBC BLD-RTO: 0 PER 100 WBC
O2/TOTAL GAS SETTING VFR VENT: 91 %
PCO2 BLD: 38.3 MMHG (ref 35–45)
PH BLD: 7.33 (ref 7.35–7.45)
PLATELET # BLD AUTO: 351 K/UL (ref 150–400)
PMV BLD AUTO: 11.5 FL (ref 8.9–12.9)
PO2 BLD: 54 MMHG (ref 80–100)
POTASSIUM SERPL-SCNC: 4.7 MMOL/L (ref 3.5–5.1)
RBC # BLD AUTO: 3.42 M/UL (ref 4.1–5.7)
SAO2 % BLD: 85.5 % (ref 92–97)
SERVICE CMNT-IMP: ABNORMAL
SODIUM SERPL-SCNC: 139 MMOL/L (ref 136–145)
SPECIMEN SOURCE: NORMAL
SPECIMEN TYPE: ABNORMAL
VANCOMYCIN SERPL-MCNC: 13.1 UG/ML
WBC # BLD AUTO: 18.2 K/UL (ref 4.1–11.1)

## 2024-03-13 PROCEDURE — 85027 COMPLETE CBC AUTOMATED: CPT

## 2024-03-13 PROCEDURE — 6370000000 HC RX 637 (ALT 250 FOR IP): Performed by: STUDENT IN AN ORGANIZED HEALTH CARE EDUCATION/TRAINING PROGRAM

## 2024-03-13 PROCEDURE — 6370000000 HC RX 637 (ALT 250 FOR IP): Performed by: EMERGENCY MEDICINE

## 2024-03-13 PROCEDURE — 2580000003 HC RX 258: Performed by: INTERNAL MEDICINE

## 2024-03-13 PROCEDURE — 97535 SELF CARE MNGMENT TRAINING: CPT

## 2024-03-13 PROCEDURE — 82803 BLOOD GASES ANY COMBINATION: CPT

## 2024-03-13 PROCEDURE — 6370000000 HC RX 637 (ALT 250 FOR IP): Performed by: INTERNAL MEDICINE

## 2024-03-13 PROCEDURE — 6370000000 HC RX 637 (ALT 250 FOR IP): Performed by: HOSPITALIST

## 2024-03-13 PROCEDURE — 97110 THERAPEUTIC EXERCISES: CPT

## 2024-03-13 PROCEDURE — 6360000002 HC RX W HCPCS: Performed by: INTERNAL MEDICINE

## 2024-03-13 PROCEDURE — 80202 ASSAY OF VANCOMYCIN: CPT

## 2024-03-13 PROCEDURE — 82962 GLUCOSE BLOOD TEST: CPT

## 2024-03-13 PROCEDURE — 2060000000 HC ICU INTERMEDIATE R&B

## 2024-03-13 PROCEDURE — 94760 N-INVAS EAR/PLS OXIMETRY 1: CPT

## 2024-03-13 PROCEDURE — 94640 AIRWAY INHALATION TREATMENT: CPT

## 2024-03-13 PROCEDURE — 2580000003 HC RX 258: Performed by: STUDENT IN AN ORGANIZED HEALTH CARE EDUCATION/TRAINING PROGRAM

## 2024-03-13 PROCEDURE — 71045 X-RAY EXAM CHEST 1 VIEW: CPT

## 2024-03-13 PROCEDURE — 6360000002 HC RX W HCPCS: Performed by: HOSPITALIST

## 2024-03-13 PROCEDURE — 2700000000 HC OXYGEN THERAPY PER DAY

## 2024-03-13 PROCEDURE — 36600 WITHDRAWAL OF ARTERIAL BLOOD: CPT

## 2024-03-13 PROCEDURE — 36415 COLL VENOUS BLD VENIPUNCTURE: CPT

## 2024-03-13 PROCEDURE — 80048 BASIC METABOLIC PNL TOTAL CA: CPT

## 2024-03-13 PROCEDURE — 97530 THERAPEUTIC ACTIVITIES: CPT

## 2024-03-13 PROCEDURE — 94660 CPAP INITIATION&MGMT: CPT

## 2024-03-13 RX ORDER — HYDRALAZINE HYDROCHLORIDE 20 MG/ML
10 INJECTION INTRAMUSCULAR; INTRAVENOUS EVERY 6 HOURS PRN
Status: DISCONTINUED | OUTPATIENT
Start: 2024-03-13 | End: 2024-03-15 | Stop reason: HOSPADM

## 2024-03-13 RX ORDER — LABETALOL HYDROCHLORIDE 5 MG/ML
5 INJECTION, SOLUTION INTRAVENOUS EVERY 6 HOURS PRN
Status: DISCONTINUED | OUTPATIENT
Start: 2024-03-13 | End: 2024-03-14

## 2024-03-13 RX ORDER — LINEZOLID 2 MG/ML
600 INJECTION, SOLUTION INTRAVENOUS EVERY 12 HOURS
Status: DISCONTINUED | OUTPATIENT
Start: 2024-03-13 | End: 2024-03-15 | Stop reason: HOSPADM

## 2024-03-13 RX ORDER — BUMETANIDE 0.25 MG/ML
2 INJECTION INTRAMUSCULAR; INTRAVENOUS ONCE
Status: COMPLETED | OUTPATIENT
Start: 2024-03-13 | End: 2024-03-13

## 2024-03-13 RX ADMIN — HYDRALAZINE HYDROCHLORIDE 10 MG: 20 INJECTION INTRAMUSCULAR; INTRAVENOUS at 12:48

## 2024-03-13 RX ADMIN — IPRATROPIUM BROMIDE AND ALBUTEROL SULFATE 1 DOSE: .5; 3 SOLUTION RESPIRATORY (INHALATION) at 02:42

## 2024-03-13 RX ADMIN — BUMETANIDE 2 MG: 0.25 INJECTION INTRAMUSCULAR; INTRAVENOUS at 09:56

## 2024-03-13 RX ADMIN — WATER 80 MG: 1 INJECTION INTRAMUSCULAR; INTRAVENOUS; SUBCUTANEOUS at 00:36

## 2024-03-13 RX ADMIN — Medication 6 MG: at 21:30

## 2024-03-13 RX ADMIN — IPRATROPIUM BROMIDE AND ALBUTEROL SULFATE 1 DOSE: .5; 3 SOLUTION RESPIRATORY (INHALATION) at 11:36

## 2024-03-13 RX ADMIN — IPRATROPIUM BROMIDE AND ALBUTEROL SULFATE 1 DOSE: .5; 3 SOLUTION RESPIRATORY (INHALATION) at 08:38

## 2024-03-13 RX ADMIN — SODIUM CHLORIDE, PRESERVATIVE FREE 10 ML: 5 INJECTION INTRAVENOUS at 21:36

## 2024-03-13 RX ADMIN — SODIUM CHLORIDE, PRESERVATIVE FREE 10 ML: 5 INJECTION INTRAVENOUS at 09:56

## 2024-03-13 RX ADMIN — NYSTATIN 500000 UNITS: 100000 SUSPENSION ORAL at 17:19

## 2024-03-13 RX ADMIN — NORTRIPTYLINE HYDROCHLORIDE 50 MG: 25 CAPSULE ORAL at 21:30

## 2024-03-13 RX ADMIN — HYDRALAZINE HYDROCHLORIDE 10 MG: 20 INJECTION INTRAMUSCULAR; INTRAVENOUS at 21:35

## 2024-03-13 RX ADMIN — APIXABAN 5 MG: 5 TABLET, FILM COATED ORAL at 21:30

## 2024-03-13 RX ADMIN — MEROPENEM 500 MG: 500 INJECTION, POWDER, FOR SOLUTION INTRAVENOUS at 17:21

## 2024-03-13 RX ADMIN — IPRATROPIUM BROMIDE AND ALBUTEROL SULFATE 1 DOSE: .5; 3 SOLUTION RESPIRATORY (INHALATION) at 17:10

## 2024-03-13 RX ADMIN — MEROPENEM 500 MG: 500 INJECTION, POWDER, FOR SOLUTION INTRAVENOUS at 04:31

## 2024-03-13 RX ADMIN — NYSTATIN 500000 UNITS: 100000 SUSPENSION ORAL at 21:30

## 2024-03-13 RX ADMIN — MONTELUKAST 10 MG: 10 TABLET, FILM COATED ORAL at 21:30

## 2024-03-13 RX ADMIN — MORPHINE SULFATE 2 MG: 2 INJECTION, SOLUTION INTRAMUSCULAR; INTRAVENOUS at 01:39

## 2024-03-13 RX ADMIN — SODIUM CHLORIDE: 9 INJECTION, SOLUTION INTRAVENOUS at 16:10

## 2024-03-13 RX ADMIN — WATER 80 MG: 1 INJECTION INTRAMUSCULAR; INTRAVENOUS; SUBCUTANEOUS at 10:34

## 2024-03-13 RX ADMIN — INSULIN LISPRO 1 UNITS: 100 INJECTION, SOLUTION INTRAVENOUS; SUBCUTANEOUS at 18:50

## 2024-03-13 RX ADMIN — SODIUM CHLORIDE: 9 INJECTION, SOLUTION INTRAVENOUS at 16:08

## 2024-03-13 RX ADMIN — LINEZOLID 600 MG: 600 INJECTION, SOLUTION INTRAVENOUS at 16:11

## 2024-03-13 RX ADMIN — IPRATROPIUM BROMIDE AND ALBUTEROL SULFATE 1 DOSE: .5; 3 SOLUTION RESPIRATORY (INHALATION) at 20:49

## 2024-03-13 RX ADMIN — NYSTATIN 500000 UNITS: 100000 SUSPENSION ORAL at 13:33

## 2024-03-13 NOTE — PLAN OF CARE
Problem: Occupational Therapy - Adult  Goal: By Discharge: Performs self-care activities at highest level of function for planned discharge setting.  See evaluation for individualized goals.  Description: FUNCTIONAL STATUS PRIOR TO ADMISSION:  Patient was ambulatory using no AD after recent R lateral malleolus fracture s/p ORIF yet sustained a hardware infection and has been hospitalized and at [facility] for @1 week then developed respiratory failure --tx to [facility]. He was performing simple ADL I to mod I and he and wife were performing I-ADL together.  Receives Help From: Family,  ,  ,  ,  ,  ,  ,  ,  ,  ,       HOME SUPPORT: Patient lived wife wife with wife to provide assistance.    Occupational Therapy Goals:  Initiated 3/11/2024  1.  Patient will perform dynamic sitting balance activities EOB and no posterior LOB with stable vitals in prep for ADL tasks with Stand by Assist within 7 day(s).  2.  Patient will perform grooming with Set-up EOB within 7 day(s).  3.  Patient will perform UB bathing EOB with Contact Guard Assist and Minimal Assist within 7 day(s).  4.  Patient will perform donning underpants with AE use in supine, rolling for pants over hips with Moderate Assist and Additional Time  within 7 day(s).  5.  Patient will perform lateral tranfer from EOB to drop arm BSC with Moderate Assist, Additional Time, and Assist x1 within 7 day(s).  6.  Patient will perform pursed lip breathing without cues Prn during session within 7 days.  7.  Patient will perform B UE theraband HEP focusing on tricep/lat/bicep strength to increase BUE strength for NWB/TTWB R LE functional transfers with SBA within 7 days.  Outcome: Progressing   OCCUPATIONAL THERAPY TREATMENT  Patient: Kingsley Wilson (78 y.o. male)  Date: 3/13/2024  Primary Diagnosis: JUDITH (acute kidney injury) (HCC) [N17.9]  Acute respiratory failure with hypoxia (HCC) [J96.01]  Severe sepsis (HCC) [A41.9, R65.20]  Acute hypoxic respiratory failure (HCC) [J96.01]

## 2024-03-13 NOTE — PROGRESS NOTES
[facility]  PROGRESS NOTE        Subjective:    Seen earlier  Some SOB    Objective:   VITALS SIGNS:    BP (!) 157/88   Pulse 93   Temp 98 °F (36.7 °C) (Axillary)   Resp 20   Ht 1.854 m (6' 1\")   Wt 98.5 kg (217 lb 2.5 oz)   SpO2 98%   BMI 28.65 kg/m²             Temp (24hrs), Av.1 °F (36.7 °C), Min:97.6 °F (36.4 °C), Max:98.5 °F (36.9 °C)         PHYSICAL EXAM:  Abd distended    DATA REVIEW:     INTAKE / OUTPUT:   Last shift:      701 - 1900  In: -   Out: 650 [Urine:650]  Last 3 shifts: 1901 -  0700  In: 968.1 [I.V.:412.4]  Out: 1050 [Urine:1050]    Intake/Output Summary (Last 24 hours) at 3/13/2024 0932  Last data filed at 3/13/2024 0718  Gross per 24 hour   Intake 256.41 ml   Output 650 ml   Net -393.59 ml           LABS:   LABS:  Recent Labs     24  0513 24  0414 24  0549 03/10/24  0418 24  1127 24  0459 24  0230 24  0204    143 144 140 138 141 138 140   K 4.7 4.3 3.8 4.1 4.2 4.0 3.6 4.0   * 116* 114* 111* 108 106 105 109*   CO2 18* 21 22 25 26 31 30 28   BUN 81* 79* 67* 48* 39* 12 11 15   CREATININE 3.22* 3.31* 3.19* 2.84* 2.68* 0.66* 0.65* 0.67*   CALCIUM 8.5 9.0 8.4* 8.8 8.5 9.1 8.6 8.6   LABALBU  --   --   --  2.0* 2.0* 2.6* 2.5* 2.3*   PHOS  --   --   --   --   --  3.1 2.6 2.4*   MG  --   --   --   --  2.1 1.6 1.6 1.6       Recent Labs     24  0513 24  0549 03/10/24  0418   WBC 18.2* 17.8* 12.0*   HGB 9.9* 9.0* 9.2*   HCT 31.4* 28.3* 29.3*    248 203       No results for input(s): \"MARIS\", \"KU\", \"CLU\", \"CREAU\" in the last 720 hours.    Invalid input(s): \"PROU\"      Assessment:        A/P:  JUDITH  2nd septic ATN,PNA;to note one episode of hemoptysis(after + cough)   microhematuria                              Foot infection  PNA,  Obesity           Non oliguric   Creat  slightly better  If more hemoptysis will send ANCA,ant-GBM  Dc IVF  One dose bumex  Will follow  Yoel Read MD

## 2024-03-13 NOTE — SIGNIFICANT EVENT
At Dr Tavarez's request, I came to meet the patient and his family as he is at high risk of requiring transfer to the ICU. For now, I think he is OK where he is but I will check on him again tomorrow. Please get a repeat CXR in the AM 03/14    Walker Willis MD  [facility] Critical Care  [facility] 4th floor [facility] phone: [phone]  [facility] 7th floor [facility] phone: [phone]  [facility] phone: [phone]  3/13/2024

## 2024-03-13 NOTE — PROGRESS NOTES
Pt placed on CPAP for low PaO2 values on ABG.         03/13/24 0635   NIV Type   $NIV $Daily Charge   Ventilator ID [id number]   Equipment Type [id number]   Mode CPAP   Mask Type Full face mask   Mask Size Medium   Assessment   Pulse 100   Respirations 19   SpO2 100 %   Settings/Measurements   CPAP/EPAP 10 cmH2O   Vt (Measured) 634 mL   FiO2  100 %   Minute Volume (L/min) 12.3 Liters   Mask Leak (lpm) 8 lpm   Patient's Home Machine No   Alarm Settings   Alarms On Y   Low Pressure (cmH2O) 5 cmH2O   High Pressure (cmH2O) 30 cmH2O   Apnea (secs) 20 secs   RR Low (bpm) 8   RR High (bpm) 40 br/min

## 2024-03-13 NOTE — PROGRESS NOTES
PULMONARY/CRITICAL CARE/SLEEP MEDICINE    Progress Note    Name: Kingsley Wilson   : 1945   MRN: [id number]   Date: 3/13/2024      Subjective:     3/13  Now on 60lpm and 90% Fio2  Looks quite fatigued      Consult Note: 3/12    This patient has been seen and evaluated at the request of Dr. Tavarez for respiratory failure. Medical records and data reviewed.  Patient is a 78 y.o. male who presented to the hospital with complaints of shortness of breath from inpatient rehab.   Patient was hospitalized at [facility] and discharged on  after treatment of cellulitis and infection of lower extremity wound after receiving a washout procedure.  He was sent home with a PICC line on 2 weeks of IV daptomycin and ceftriaxone.  PICC line was inserted for IV antibiotics as an outpatient.  X-ray from the day of PICC line insertion on  cannot be pulled up for review.  He was subsequently admitted on the  with worsening dyspnea and cough.  X-ray on admission showed multifocal infiltrates with dense consolidation of right upper lobe.  X-ray this morning has shown evolution with consolidative airspace disease in the left mid lung fields as well.  He has had worsening oxygenation and is requiring high flow nasal oxygen.  ABG that was done did not show presence of respiratory acidosis and metabolic acidosis from underlying renal failure was noted.  Peripheral eosinophil count is not elevated.  He has had streaking hemoptysis.  He is currently therapeutically anticoagulated.    He quit smoking 28 years ago.  He does not have a history of chronic lung disease.  He has had childhood asthma and seasonal allergies for which she has been receiving montelukast.  He is not followed in our office on a regular basis    Assessment:     Acute hypoxic respiratory failure  Multilobar infiltrates, differential diagnosis includes acute infectious healthcare associated pneumonia pneumonia given recent  hypercoagulable state ([facility]) 09/10/2021    Nonrheumatic aortic valve stenosis 03/08/2021    Chronic obstructive pulmonary disease ([facility]) 05/08/2016    Hypertension complicating diabetes ([facility]) 08/03/2015    Encounter for long-term (current) use of medications 08/01/2014    Hypercholesteremia     DVT (deep venous thrombosis) ([facility])     Peripheral neuropathy 10/29/2013    Coagulation disorder ([facility]) 10/14/2013    Factor V Leiden ([facility]) 10/14/2013       Current medications:     [unfilled]    Past Medical History:      has a past medical history of Chronic obstructive pulmonary disease ([facility]), DVT (deep venous thrombosis) ([facility]), Factor 5 Leiden mutation, heterozygous ([facility]), History of vascular access device, Hypercholesteremia, Hypertension, Nonrheumatic aortic valve stenosis, Orthostatic hypotension, Peripheral neuropathy, and Pneumothorax.    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    Home Medications:     Prior to Admission medications    Medication Sig Start Date End Date Taking? Authorizing Provider   polyethylene glycol (GLYCOLAX) 17 g packet Take 1 packet by mouth daily   Yes ProviderKarely MD   senna (SENOKOT) 8.6 MG tablet Take 1 tablet by mouth daily   Yes ProviderKarely MD   apixaban (ELIQUIS) 5 MG TABS tablet Take 2 tablets by mouth 2 times daily for 7 days, THEN 1 tablet 2 times daily. 2/28/24 4/5/24  Jus Haque MD   nortriptyline (PAMELOR) 50 MG capsule Take 1 capsule by mouth nightly 2/28/24   Jus Haque MD   lactobacillus (CULTURELLE) capsule Take 1 capsule by mouth daily (with breakfast) 2/29/24   Jus Haque MD   amLODIPine (NORVASC) 10 MG tablet Take 1 tablet by mouth daily 2/29/24   Jus Haque MD   melatonin 3

## 2024-03-13 NOTE — PLAN OF CARE
Problem: Physical Therapy - Adult  Goal: By Discharge: Performs mobility at highest level of function for planned discharge setting.  See evaluation for individualized goals.  Description: FUNCTIONAL STATUS PRIOR TO ADMISSION: At baseline patient is ambulatory.  Has been at [facility] prior to admit and working on lateral transfers.  Was also working on ascending/descending stairs by sitting down on the step.  Was supposed to DC this week from [facility].        HOME SUPPORT PRIOR TO ADMISSION: The patient lived with wife but did not require assistance at baseline.    Physical Therapy Goals  Initiated 3/10/2024  1.  Patient will move from supine to sit and sit to supine in bed with supervision/set-up within 7 day(s).    2.  Patient will perform sit to stand with moderate assistance within 7 day(s).  3.  Patient will transfer from bed to chair and chair to bed with moderate assistance using the least restrictive device within 7 day(s).    Outcome: Progressing   PHYSICAL THERAPY TREATMENT    Patient: Kingsley Wilson (78 y.o. male)  Date: 3/13/2024  Diagnosis: JUDITH (acute kidney injury) (HCC) [N17.9]  Acute respiratory failure with hypoxia (HCC) [J96.01]  Severe sepsis (HCC) [A41.9, R65.20]  Acute hypoxic respiratory failure (HCC) [J96.01] Acute hypoxic respiratory failure (HCC)      Precautions: Fall Risk (TTWB RLE when transitioning around out of his wheelchair but he should remain nonweightbearing otherwise, taken from ortho note) Right Lower Extremity Weight Bearing: Non Weight Bearing                    ASSESSMENT:  Patient continues to benefit from skilled PT services. Progress toward goals limited by decreased activity tolerance/ respiratory status this date. Pt received in [facility] with NRB in place; cleared for participation in therapy as tolerated per RN/RT. Activity limited to bed level this date due to increased O2 requirement. Pt tolerated brief AROM ex and rolling with use of bed rail  and SBA, SpO2 >90%. Noted O2 desat  respiratory status this date  Education Method: Verbal  Barriers to Learning: Cognition  Education Outcome: Continued education needed      Beatrice Pantoja, PT  Minutes: 17

## 2024-03-13 NOTE — PROGRESS NOTES
0400 Patient pulling off Hi Lex cannula at bedtime, with oxygen saturation dropping to 60s and 50s on each event. 1:1 sitter ordered, and now sitting with patient.     0632 ABGs drawn after contacting NP because patient was unable to keep O2 saturation above 90% while on maximum settings on Hi Lex nasal cannula. NP notified and patient placed on CPAP per respiratory therapist recommendation based on low PaO2 results. Report endorsed to relieving nurse.

## 2024-03-13 NOTE — CARE COORDINATION
Transition of Care Plan:    RUR: 25% - high  Prior Level of Functioning: from [facility]  Disposition: return to [facility] pending medical progress; return referral sent  IPR: Date FOC offered: 3/12/24  Date FOC received: 3/12/24  Accepting facility: under review with [facility]  Follow up appointments: rehab  DME needed: none for rehab discharge  Transportation at discharge: stretcher vs w/c van  IM/IMM Medicare/ letter given: 3/9/24  Caregiver Contact: wife - La Nena Wilson - [phone]  Discharge Caregiver contacted prior to discharge? no  Care Conference needed? no  Barriers to discharge: medical - pt on 50L oxygen - pneumonia/resp failure    Chart reviewed. Patient admitted from [facility] for worsening hypoxia. Return referral remains under review this morning with [facility]. Patient is not medically stable for discharge. Patient is on 50L high flow; high risk for deterioration and escalation of care. Multiple consults on board - Nephrology, Pulmonary, Therapy. Will follow treatment team for recommendations. Patient will not need insurance authorization for facility placement.    Disposition Plan:  IPR: [facility] - reviewing return referral; pt not medically stable  Transportation: anticipate glenny Garcia, MPH  Care Manager l [facility]  Available via [employer]

## 2024-03-14 ENCOUNTER — APPOINTMENT (OUTPATIENT)
Facility: HOSPITAL | Age: 79
End: 2024-03-14
Payer: MEDICARE

## 2024-03-14 PROBLEM — R06.02 SHORTNESS OF BREATH: Status: ACTIVE | Noted: 2024-03-14

## 2024-03-14 PROBLEM — J69.0 ASPIRATION PNEUMONIA OF RIGHT UPPER LOBE DUE TO REGURGITATED FOOD (HCC): Status: ACTIVE | Noted: 2024-03-14

## 2024-03-14 PROBLEM — R53.81 DEBILITY: Status: ACTIVE | Noted: 2024-03-14

## 2024-03-14 LAB
ANION GAP SERPL CALC-SCNC: 7 MMOL/L (ref 5–15)
ARTERIAL PATENCY WRIST A: POSITIVE
BACTERIA SPEC CULT: NORMAL
BASE DEFICIT BLD-SCNC: 2.6 MMOL/L
BASOPHILS # BLD: 0 K/UL (ref 0–0.1)
BASOPHILS NFR BLD: 0 % (ref 0–1)
BDY SITE: ABNORMAL
BUN SERPL-MCNC: 85 MG/DL (ref 6–20)
BUN/CREAT SERPL: 26 (ref 12–20)
CALCIUM SERPL-MCNC: 8.8 MG/DL (ref 8.5–10.1)
CHLORIDE SERPL-SCNC: 120 MMOL/L (ref 97–108)
CO2 SERPL-SCNC: 19 MMOL/L (ref 21–32)
CREAT SERPL-MCNC: 3.25 MG/DL (ref 0.7–1.3)
CRP SERPL-MCNC: 2.78 MG/DL (ref 0–0.3)
DIFFERENTIAL METHOD BLD: ABNORMAL
EOSINOPHIL # BLD: 0 K/UL (ref 0–0.4)
EOSINOPHIL NFR BLD: 0 % (ref 0–7)
ERYTHROCYTE [DISTWIDTH] IN BLOOD BY AUTOMATED COUNT: 15.3 % (ref 11.5–14.5)
ERYTHROCYTE [SEDIMENTATION RATE] IN BLOOD: 73 MM/HR (ref 0–20)
GAS FLOW.O2 O2 DELIVERY SYS: ABNORMAL
GLUCOSE BLD STRIP.AUTO-MCNC: 115 MG/DL (ref 65–117)
GLUCOSE BLD STRIP.AUTO-MCNC: 167 MG/DL (ref 65–117)
GLUCOSE BLD STRIP.AUTO-MCNC: 184 MG/DL (ref 65–117)
GLUCOSE BLD STRIP.AUTO-MCNC: 267 MG/DL (ref 65–117)
GLUCOSE SERPL-MCNC: 139 MG/DL (ref 65–100)
GRAM STN SPEC: NORMAL
HCO3 BLD-SCNC: 23.3 MMOL/L (ref 22–26)
HCT VFR BLD AUTO: 29.6 % (ref 36.6–50.3)
HGB BLD-MCNC: 9.5 G/DL (ref 12.1–17)
IMM GRANULOCYTES # BLD AUTO: 0 K/UL
IMM GRANULOCYTES NFR BLD AUTO: 0 %
LYMPHOCYTES # BLD: 2.2 K/UL (ref 0.8–3.5)
LYMPHOCYTES NFR BLD: 11 % (ref 12–49)
MAGNESIUM SERPL-MCNC: 2.5 MG/DL (ref 1.6–2.4)
MCH RBC QN AUTO: 29.1 PG (ref 26–34)
MCHC RBC AUTO-ENTMCNC: 32.1 G/DL (ref 30–36.5)
MCV RBC AUTO: 90.8 FL (ref 80–99)
METAMYELOCYTES NFR BLD MANUAL: 1 %
MONOCYTES # BLD: 2 K/UL (ref 0–1)
MONOCYTES NFR BLD: 10 % (ref 5–13)
MYELOCYTES NFR BLD MANUAL: 1 %
NEUTS BAND NFR BLD MANUAL: 4 % (ref 0–6)
NEUTS SEG # BLD: 15.5 K/UL (ref 1.8–8)
NEUTS SEG NFR BLD: 73 % (ref 32–75)
NRBC # BLD: 0.02 K/UL (ref 0–0.01)
NRBC BLD-RTO: 0.1 PER 100 WBC
O2/TOTAL GAS SETTING VFR VENT: 100 %
PCO2 BLD: 43.8 MMHG (ref 35–45)
PH BLD: 7.34 (ref 7.35–7.45)
PHOSPHATE SERPL-MCNC: 5 MG/DL (ref 2.6–4.7)
PLATELET # BLD AUTO: 321 K/UL (ref 150–400)
PMV BLD AUTO: 11.4 FL (ref 8.9–12.9)
PO2 BLD: 54 MMHG (ref 80–100)
POTASSIUM SERPL-SCNC: 4.8 MMOL/L (ref 3.5–5.1)
RBC # BLD AUTO: 3.26 M/UL (ref 4.1–5.7)
RBC MORPH BLD: ABNORMAL
SAO2 % BLD: 85.5 % (ref 92–97)
SERVICE CMNT-IMP: ABNORMAL
SERVICE CMNT-IMP: NORMAL
SODIUM SERPL-SCNC: 146 MMOL/L (ref 136–145)
SPECIMEN TYPE: ABNORMAL
WBC # BLD AUTO: 20.1 K/UL (ref 4.1–11.1)

## 2024-03-14 PROCEDURE — 86140 C-REACTIVE PROTEIN: CPT

## 2024-03-14 PROCEDURE — 2580000003 HC RX 258: Performed by: INTERNAL MEDICINE

## 2024-03-14 PROCEDURE — 6360000002 HC RX W HCPCS: Performed by: NURSE PRACTITIONER

## 2024-03-14 PROCEDURE — 6370000000 HC RX 637 (ALT 250 FOR IP): Performed by: INTERNAL MEDICINE

## 2024-03-14 PROCEDURE — 6360000002 HC RX W HCPCS: Performed by: INTERNAL MEDICINE

## 2024-03-14 PROCEDURE — 5A09357 ASSISTANCE WITH RESPIRATORY VENTILATION, LESS THAN 24 CONSECUTIVE HOURS, CONTINUOUS POSITIVE AIRWAY PRESSURE: ICD-10-PCS | Performed by: INTERNAL MEDICINE

## 2024-03-14 PROCEDURE — 99222 1ST HOSP IP/OBS MODERATE 55: CPT | Performed by: PHYSICAL MEDICINE & REHABILITATION

## 2024-03-14 PROCEDURE — 51702 INSERT TEMP BLADDER CATH: CPT

## 2024-03-14 PROCEDURE — 2580000003 HC RX 258: Performed by: STUDENT IN AN ORGANIZED HEALTH CARE EDUCATION/TRAINING PROGRAM

## 2024-03-14 PROCEDURE — 80048 BASIC METABOLIC PNL TOTAL CA: CPT

## 2024-03-14 PROCEDURE — 83735 ASSAY OF MAGNESIUM: CPT

## 2024-03-14 PROCEDURE — 99233 SBSQ HOSP IP/OBS HIGH 50: CPT | Performed by: INTERNAL MEDICINE

## 2024-03-14 PROCEDURE — 85652 RBC SED RATE AUTOMATED: CPT

## 2024-03-14 PROCEDURE — 6370000000 HC RX 637 (ALT 250 FOR IP): Performed by: STUDENT IN AN ORGANIZED HEALTH CARE EDUCATION/TRAINING PROGRAM

## 2024-03-14 PROCEDURE — 6370000000 HC RX 637 (ALT 250 FOR IP): Performed by: HOSPITALIST

## 2024-03-14 PROCEDURE — 84100 ASSAY OF PHOSPHORUS: CPT

## 2024-03-14 PROCEDURE — 82962 GLUCOSE BLOOD TEST: CPT

## 2024-03-14 PROCEDURE — C9113 INJ PANTOPRAZOLE SODIUM, VIA: HCPCS | Performed by: INTERNAL MEDICINE

## 2024-03-14 PROCEDURE — 2000000000 HC ICU R&B

## 2024-03-14 PROCEDURE — 36600 WITHDRAWAL OF ARTERIAL BLOOD: CPT

## 2024-03-14 PROCEDURE — 6370000000 HC RX 637 (ALT 250 FOR IP): Performed by: EMERGENCY MEDICINE

## 2024-03-14 PROCEDURE — 2700000000 HC OXYGEN THERAPY PER DAY

## 2024-03-14 PROCEDURE — 71045 X-RAY EXAM CHEST 1 VIEW: CPT

## 2024-03-14 PROCEDURE — 85025 COMPLETE CBC W/AUTO DIFF WBC: CPT

## 2024-03-14 PROCEDURE — 6360000002 HC RX W HCPCS: Performed by: HOSPITALIST

## 2024-03-14 PROCEDURE — 82803 BLOOD GASES ANY COMBINATION: CPT

## 2024-03-14 PROCEDURE — 94640 AIRWAY INHALATION TREATMENT: CPT

## 2024-03-14 PROCEDURE — 94660 CPAP INITIATION&MGMT: CPT

## 2024-03-14 PROCEDURE — 36415 COLL VENOUS BLD VENIPUNCTURE: CPT

## 2024-03-14 PROCEDURE — A4216 STERILE WATER/SALINE, 10 ML: HCPCS | Performed by: INTERNAL MEDICINE

## 2024-03-14 PROCEDURE — 2500000003 HC RX 250 WO HCPCS

## 2024-03-14 PROCEDURE — 94760 N-INVAS EAR/PLS OXIMETRY 1: CPT

## 2024-03-14 PROCEDURE — 2500000003 HC RX 250 WO HCPCS: Performed by: INTERNAL MEDICINE

## 2024-03-14 RX ORDER — SODIUM CHLORIDE 450 MG/100ML
INJECTION, SOLUTION INTRAVENOUS CONTINUOUS
Status: DISCONTINUED | OUTPATIENT
Start: 2024-03-14 | End: 2024-03-15

## 2024-03-14 RX ORDER — DEXMEDETOMIDINE HYDROCHLORIDE 4 UG/ML
INJECTION, SOLUTION INTRAVENOUS
Status: COMPLETED
Start: 2024-03-14 | End: 2024-03-14

## 2024-03-14 RX ORDER — HALOPERIDOL 5 MG/ML
2 INJECTION INTRAMUSCULAR EVERY 4 HOURS PRN
Status: DISCONTINUED | OUTPATIENT
Start: 2024-03-14 | End: 2024-03-15 | Stop reason: HOSPADM

## 2024-03-14 RX ORDER — HALOPERIDOL 5 MG/ML
5 INJECTION INTRAMUSCULAR ONCE
Status: COMPLETED | OUTPATIENT
Start: 2024-03-14 | End: 2024-03-14

## 2024-03-14 RX ORDER — HALOPERIDOL 5 MG/ML
5 INJECTION INTRAMUSCULAR EVERY 6 HOURS PRN
Status: DISCONTINUED | OUTPATIENT
Start: 2024-03-14 | End: 2024-03-14

## 2024-03-14 RX ORDER — LABETALOL HYDROCHLORIDE 5 MG/ML
10 INJECTION, SOLUTION INTRAVENOUS EVERY 6 HOURS PRN
Status: DISCONTINUED | OUTPATIENT
Start: 2024-03-14 | End: 2024-03-15

## 2024-03-14 RX ORDER — DEXMEDETOMIDINE HYDROCHLORIDE 4 UG/ML
.1-1.5 INJECTION, SOLUTION INTRAVENOUS CONTINUOUS
Status: DISCONTINUED | OUTPATIENT
Start: 2024-03-14 | End: 2024-03-15 | Stop reason: HOSPADM

## 2024-03-14 RX ORDER — BUMETANIDE 0.25 MG/ML
2 INJECTION INTRAMUSCULAR; INTRAVENOUS ONCE
Status: COMPLETED | OUTPATIENT
Start: 2024-03-14 | End: 2024-03-14

## 2024-03-14 RX ORDER — QUETIAPINE FUMARATE 25 MG/1
50 TABLET, FILM COATED ORAL NIGHTLY
Status: DISCONTINUED | OUTPATIENT
Start: 2024-03-14 | End: 2024-03-15 | Stop reason: HOSPADM

## 2024-03-14 RX ADMIN — WATER 80 MG: 1 INJECTION INTRAMUSCULAR; INTRAVENOUS; SUBCUTANEOUS at 09:39

## 2024-03-14 RX ADMIN — LABETALOL HYDROCHLORIDE 5 MG: 5 INJECTION, SOLUTION INTRAVENOUS at 09:39

## 2024-03-14 RX ADMIN — IPRATROPIUM BROMIDE AND ALBUTEROL SULFATE 1 DOSE: .5; 3 SOLUTION RESPIRATORY (INHALATION) at 15:55

## 2024-03-14 RX ADMIN — IPRATROPIUM BROMIDE AND ALBUTEROL SULFATE 1 DOSE: .5; 3 SOLUTION RESPIRATORY (INHALATION) at 18:07

## 2024-03-14 RX ADMIN — HALOPERIDOL LACTATE 2 MG: 5 INJECTION, SOLUTION INTRAMUSCULAR at 16:25

## 2024-03-14 RX ADMIN — SODIUM CHLORIDE, PRESERVATIVE FREE 10 ML: 5 INJECTION INTRAVENOUS at 08:44

## 2024-03-14 RX ADMIN — MEROPENEM 500 MG: 500 INJECTION, POWDER, FOR SOLUTION INTRAVENOUS at 05:30

## 2024-03-14 RX ADMIN — IPRATROPIUM BROMIDE AND ALBUTEROL SULFATE 1 DOSE: .5; 3 SOLUTION RESPIRATORY (INHALATION) at 07:23

## 2024-03-14 RX ADMIN — ARFORMOTEROL TARTRATE: 15 SOLUTION RESPIRATORY (INHALATION) at 19:05

## 2024-03-14 RX ADMIN — NYSTATIN 500000 UNITS: 100000 SUSPENSION ORAL at 13:37

## 2024-03-14 RX ADMIN — DEXMEDETOMIDINE HYDROCHLORIDE 1.5 MCG/KG/HR: 400 INJECTION, SOLUTION INTRAVENOUS at 21:09

## 2024-03-14 RX ADMIN — AMLODIPINE BESYLATE 10 MG: 5 TABLET ORAL at 08:43

## 2024-03-14 RX ADMIN — DEXMEDETOMIDINE HYDROCHLORIDE 0.2 MCG/KG/HR: 400 INJECTION, SOLUTION INTRAVENOUS at 19:02

## 2024-03-14 RX ADMIN — LINEZOLID 600 MG: 600 INJECTION, SOLUTION INTRAVENOUS at 04:30

## 2024-03-14 RX ADMIN — ACETAMINOPHEN 650 MG: 325 TABLET ORAL at 10:38

## 2024-03-14 RX ADMIN — DEXMEDETOMIDINE HYDROCHLORIDE 1.5 MCG/KG/HR: 400 INJECTION, SOLUTION INTRAVENOUS at 23:52

## 2024-03-14 RX ADMIN — Medication 1 CAPSULE: at 08:43

## 2024-03-14 RX ADMIN — LABETALOL HYDROCHLORIDE 5 MG: 5 INJECTION, SOLUTION INTRAVENOUS at 03:56

## 2024-03-14 RX ADMIN — NYSTATIN 500000 UNITS: 100000 SUSPENSION ORAL at 08:46

## 2024-03-14 RX ADMIN — APIXABAN 5 MG: 5 TABLET, FILM COATED ORAL at 08:43

## 2024-03-14 RX ADMIN — WATER 2 MG: 1 INJECTION INTRAMUSCULAR; INTRAVENOUS; SUBCUTANEOUS at 13:26

## 2024-03-14 RX ADMIN — MEROPENEM 500 MG: 500 INJECTION, POWDER, FOR SOLUTION INTRAVENOUS at 17:42

## 2024-03-14 RX ADMIN — SODIUM CHLORIDE: 4.5 INJECTION, SOLUTION INTRAVENOUS at 15:26

## 2024-03-14 RX ADMIN — IPRATROPIUM BROMIDE AND ALBUTEROL SULFATE 1 DOSE: .5; 3 SOLUTION RESPIRATORY (INHALATION) at 11:51

## 2024-03-14 RX ADMIN — HYDRALAZINE HYDROCHLORIDE 10 MG: 20 INJECTION INTRAMUSCULAR; INTRAVENOUS at 08:43

## 2024-03-14 RX ADMIN — LABETALOL HYDROCHLORIDE 10 MG: 5 INJECTION INTRAVENOUS at 18:03

## 2024-03-14 RX ADMIN — METHYLPREDNISOLONE SODIUM SUCCINATE 40 MG: 40 INJECTION INTRAMUSCULAR; INTRAVENOUS at 23:52

## 2024-03-14 RX ADMIN — HALOPERIDOL LACTATE 5 MG: 5 INJECTION, SOLUTION INTRAMUSCULAR at 19:23

## 2024-03-14 RX ADMIN — ASPIRIN 81 MG: 81 TABLET, COATED ORAL at 08:43

## 2024-03-14 RX ADMIN — LINEZOLID 600 MG: 600 INJECTION, SOLUTION INTRAVENOUS at 15:28

## 2024-03-14 RX ADMIN — BUMETANIDE 2 MG: 0.25 INJECTION INTRAMUSCULAR; INTRAVENOUS at 09:38

## 2024-03-14 RX ADMIN — METOPROLOL SUCCINATE 50 MG: 50 TABLET, EXTENDED RELEASE ORAL at 08:43

## 2024-03-14 RX ADMIN — INSULIN LISPRO 2 UNITS: 100 INJECTION, SOLUTION INTRAVENOUS; SUBCUTANEOUS at 17:44

## 2024-03-14 RX ADMIN — WATER 2 MG: 1 INJECTION INTRAMUSCULAR; INTRAVENOUS; SUBCUTANEOUS at 13:29

## 2024-03-14 RX ADMIN — METHYLPREDNISOLONE SODIUM SUCCINATE 40 MG: 40 INJECTION INTRAMUSCULAR; INTRAVENOUS at 17:34

## 2024-03-14 RX ADMIN — PANTOPRAZOLE SODIUM 40 MG: 40 INJECTION, POWDER, FOR SOLUTION INTRAVENOUS at 17:36

## 2024-03-14 RX ADMIN — NYSTATIN 500000 UNITS: 100000 SUSPENSION ORAL at 17:34

## 2024-03-14 ASSESSMENT — PAIN SCALES - GENERAL
PAINLEVEL_OUTOF10: 3
PAINLEVEL_OUTOF10: 0

## 2024-03-14 ASSESSMENT — PAIN DESCRIPTION - LOCATION: LOCATION: HEAD

## 2024-03-14 NOTE — PROGRESS NOTES
edema  GI:  Soft, Non distended, Non tender.  +Bowel sounds  Neurologic:  Alert and oriented X 3, normal speech,   Psych:   Good insight. Not anxious nor agitated  Skin:  No rashes.  No jaundice      Central Line: PICC Double Lumen 02/28/24 Right Basilic-Central Line Being Utilized: No      ________________________________________________________________________  Care Plan discussed with:    Comments   Patient x    Family      RN     Care Manager     Consultant                        Multidiciplinary team rounds were held today with , nursing, pharmacist and clinical coordinator.  Patient's plan of care was discussed; medications were reviewed and discharge planning was addressed.     ________________________________________________________________________  Total NON critical care TIME:   35  Minutes    Total CRITICAL CARE TIME Spent:   Minutes non procedure based      Comments   >50% of visit spent in counseling and coordination of care x     This includes time during multidisciplinary rounds if indicated above   ________________________________________________________________________  Jose Rose MD     Procedures: see electronic medical records for all procedures/Xrays and details which were not copied into this note but were reviewed prior to creation of Plan.      LABS:  I reviewed today's most current labs and imaging studies.  Pertinent labs include:  Recent Labs     03/13/24  0513 03/14/24  0430   WBC 18.2* 20.1*   HGB 9.9* 9.5*   HCT 31.4* 29.6*    321       Recent Labs     03/12/24  0414 03/13/24  0513 03/14/24  0430    139 146*   K 4.3 4.7 4.8   * 115* 120*   CO2 21 18* 19*   BUN 79* 81* 85*   MG  --   --  2.5*   PHOS  --   --  5.0*

## 2024-03-14 NOTE — PROGRESS NOTES
Infectious Disease Progress Note       Subjective:     Remains SOB on HFNC and pending transfer to ICU, afebrile, making urine still.  + Cough.  Added Linezolid to Meropenem in addition to high dose solumedrol.    Objective:    Vitals:   Patient Vitals for the past 24 hrs:   Temp Pulse Resp BP SpO2   03/14/24 1300 98.2 °F (36.8 °C) (!) 109 22 (!) 150/97 --   03/14/24 1200 97.7 °F (36.5 °C) (!) 107 28 (!) 160/71 90 %   03/14/24 1151 -- (!) 109 25 -- 94 %   03/14/24 1100 97.5 °F (36.4 °C) (!) 107 20 (!) 173/82 92 %   03/14/24 1035 -- (!) 107 25 (!) 160/77 --   03/14/24 1000 -- (!) 110 -- -- --   03/14/24 0945 -- (!) 113 20 (!) 193/114 --   03/14/24 0940 -- (!) 114 30 (!) 180/94 --   03/14/24 0805 -- (!) 112 19 (!) 183/91 --   03/14/24 0800 -- (!) 114 19 (!) 195/168 --   03/14/24 0729 -- (!) 113 27 -- 100 %   03/14/24 0704 97.8 °F (36.6 °C) (!) 109 26 (!) 177/91 100 %   03/14/24 0600 -- (!) 110 -- -- --   03/14/24 0413 -- (!) 106 24 -- 100 %   03/14/24 0400 -- (!) 102 19 130/75 98 %   03/14/24 0356 -- (!) 112 -- (!) 168/96 --   03/14/24 0349 98 °F (36.7 °C) (!) 118 20 (!) 198/77 97 %   03/14/24 0200 -- (!) 110 -- -- --   03/14/24 0053 -- (!) 104 19 -- 97 %   03/13/24 2257 97.6 °F (36.4 °C) (!) 107 21 (!) 164/78 98 %   03/13/24 2200 -- (!) 120 -- -- --   03/13/24 2135 -- -- -- (!) 168/82 --   03/13/24 2049 -- -- 20 -- 94 %   03/13/24 2000 98.2 °F (36.8 °C) (!) 107 18 (!) 170/94 95 %   03/13/24 1908 97.6 °F (36.4 °C) (!) 107 25 (!) 178/85 92 %   03/13/24 1800 -- (!) 111 -- -- --   03/13/24 1600 -- -- -- -- 92 %   03/13/24 1515 97.5 °F (36.4 °C) (!) 106 18 (!) 169/87 (!) 89 %   03/13/24 1400 -- (!) 104 -- -- --         Physical Exam:  Gen: Increased WOB  HEENT:  anicteric  CV: tachycardia  Lungs: exp mild wheezes and left sided decreased bs, increased WOB  Abdomen: soft, non-distended  Genitourinary:  no baez catheter   Skin: venous stasis dermatitis b/l lower extremities, healing surgical sites RLE   Psych: good  PRN, Hugo Tavarez MD    dextrose bolus 10% 125 mL, 125 mL, IntraVENous, PRN **OR** dextrose bolus 10% 250 mL, 250 mL, IntraVENous, PRN, Hugo Tavarez MD    glucagon injection 1 mg, 1 mg, SubCUTAneous, PRN, Hugo Tavarez MD    dextrose 10 % infusion, , IntraVENous, Continuous PRN, Hugo Tavarez MD    metoprolol succinate (TOPROL XL) extended release tablet 50 mg, 50 mg, Oral, Daily, Hugo Tavarez MD, 50 mg at 03/14/24 0843    apixaban (ELIQUIS) tablet 5 mg, 5 mg, Oral, BID, Jasvir Leos MD, 5 mg at 03/14/24 0843    aspirin EC tablet 81 mg, 81 mg, Oral, Daily, Jasvir Leos MD, 81 mg at 03/14/24 0843    lactobacillus (CULTURELLE) capsule 1 capsule, 1 capsule, Oral, Daily with breakfast, Jasvir Leos MD, 1 capsule at 03/14/24 0843    melatonin tablet 6 mg, 6 mg, Oral, Nightly PRN, Jasvir Leos MD, 6 mg at 03/13/24 2130    montelukast (SINGULAIR) tablet 10 mg, 10 mg, Oral, Nightly, Jasvir Leos MD, 10 mg at 03/13/24 2130    nortriptyline (PAMELOR) capsule 50 mg, 50 mg, Oral, Nightly, Jasvir Leos MD, 50 mg at 03/13/24 2130    sodium chloride flush 0.9 % injection 5-40 mL, 5-40 mL, IntraVENous, 2 times per day, Jasvir Leos MD, 10 mL at 03/14/24 0844    sodium chloride flush 0.9 % injection 5-40 mL, 5-40 mL, IntraVENous, PRN, Jasvir Leos MD    0.9 % sodium chloride infusion, , IntraVENous, PRN, Jasvir Leos MD, Stopped at 03/13/24 1611    ondansetron (ZOFRAN-ODT) disintegrating tablet 4 mg, 4 mg, Oral, Q8H PRN **OR** ondansetron (ZOFRAN) injection 4 mg, 4 mg, IntraVENous, Q6H PRN, Jasvir Leos MD    polyethylene glycol (GLYCOLAX) packet 17 g, 17 g, Oral, Daily PRN, Jasvir Leos MD, 17 g at 03/12/24 1535    acetaminophen (TYLENOL) tablet 650 mg, 650 mg, Oral, Q6H PRN, 650 mg at 03/14/24 1038 **OR** acetaminophen (TYLENOL) suppository 650 mg, 650 mg, Rectal, Q6H PRN, Jasvir Leos MD    [Held by provider] gabapentin (NEURONTIN) tablet 300 mg, 300 mg, Oral,

## 2024-03-14 NOTE — ACP (ADVANCE CARE PLANNING)
03/14 Extended discussion regarding GOC with pt and separately with wife and daughter (who is a retired PA). They understand that prognosis is guarded and that if he were to be intubated, there is no guarantee that he could improve sufficiently to be successfully extubated. They also understand that survival from a severe and/or prolonged critical illness always involves some long term consequences - physical, psychiatric, cognitive. I recommended that we should, if necessary, proceed with intubation but not prolonged vent support that would require trach tube placement and all that goes with that. He has a Living Will and I have asked them to bring that in for our records. They are in agreement with the plan a recommended. Palliative Care consultation requested, has been performed and is appreciated     Walker Willis MD  [facility] Critical Care  [facility] 4th floor [facility] phone: [phone]  [facility] 7th floor [facility] phone: [phone]  [facility] phone: [phone]  3/14/2024

## 2024-03-14 NOTE — CARE COORDINATION
Transition of Care Plan:    RUR: 25% High   Prior Level of Functioning: Rehab at [facility]   Disposition: Return to [facility]  Accepting facility:   Date authorization started with reference number: NA  DME needed: TBD  Transportation at discharge: Family vs BLS   IM/IMM Medicare 3/9/24 :   Is patient a  and connected with VA? No  Caregiver Contact: wife - La Nena Wilson - [phone]   Discharge Caregiver contacted prior to discharge? No  Care Conference needed? No  Barriers to discharge:    Patient admitted with Patient transferred to ICU for higher level of care. Patient on Hi flow oxygen 60 L and 90% FIO2.   Plan will be [facility], referral sent.   Beulah Costa RN,Care management

## 2024-03-14 NOTE — PROGRESS NOTES
[facility]  PROGRESS NOTE        Subjective:     SOB  Seen by ICU  Objective:   VITALS SIGNS:    BP (!) 177/91   Pulse (!) 113   Temp 97.8 °F (36.6 °C) (Axillary)   Resp 27   Ht 1.854 m (6' 1\")   Wt 98.5 kg (217 lb 2.5 oz)   SpO2 100%   BMI 28.65 kg/m²             Temp (24hrs), Av.9 °F (36.6 °C), Min:97.5 °F (36.4 °C), Max:98.3 °F (36.8 °C)         PHYSICAL EXAM:  Abd distended    DATA REVIEW:     INTAKE / OUTPUT:   Last shift:      No intake/output data recorded.  Last 3 shifts:  1901 -  0700  In: 506.1 [I.V.:0.6]  Out: 1050 [Urine:1050]    Intake/Output Summary (Last 24 hours) at 3/14/2024 0856  Last data filed at 3/13/2024 1732  Gross per 24 hour   Intake 406.02 ml   Output 400 ml   Net 6.02 ml           LABS:   LABS:  Recent Labs     24  0430 24  0513 24  0414 24  0549 03/10/24  0418 24  1127 24  0459 24  0230   * 139 143   < > 140 138 141 138   K 4.8 4.7 4.3   < > 4.1 4.2 4.0 3.6   * 115* 116*   < > 111* 108 106 105   CO2 19* 18* 21   < > 25 26 31 30   BUN 85* 81* 79*   < > 48* 39* 12 11   CREATININE 3.25* 3.22* 3.31*   < > 2.84* 2.68* 0.66* 0.65*   CALCIUM 8.8 8.5 9.0   < > 8.8 8.5 9.1 8.6   LABALBU  --   --   --   --  2.0* 2.0* 2.6* 2.5*   PHOS 5.0*  --   --   --   --   --  3.1 2.6   MG 2.5*  --   --   --   --  2.1 1.6 1.6    < > = values in this interval not displayed.       Recent Labs     24  0430 24  0513 24  0549   WBC 20.1* 18.2* 17.8*   HGB 9.5* 9.9* 9.0*   HCT 29.6* 31.4* 28.3*    351 248       No results for input(s): \"MARIS\", \"KU\", \"CLU\", \"CREAU\" in the last 720 hours.    Invalid input(s): \"PROU\"      Assessment:        A/P:  JUDITH  2nd septic ATN,PNA;to note one episode of hemoptysis(after + cough)   microhematuria                              Foot infection  PNA,  Obesity           Non oliguric   Creat  levelling   More diuretics  Bladder scan  Watch any dialysis indication  Discussed with him,family  Yoel

## 2024-03-14 NOTE — PROGRESS NOTES
Spiritual Care Assessment/Progress Note  [facility]    Name: Kingsley Wilson MRN: [id number]    Age: 78 y.o.     Sex: male   Language: English     Date: 3/14/2024            Total Time Calculated: 20 min              Spiritual Assessment begun in [facility] 7S2 INTENSIVE CARE  Service Provided For:: Patient  Referral/Consult From:: Palliative Care  Encounter Overview/Reason : Palliative Care    Spiritual beliefs:      [x] Involved in a natalie tradition/spiritual practice: Jew      [x] Supported by a natalie community: Restoration      [] Claims no spiritual orientation:      [] Seeking spiritual identity:           [] Adheres to an individual form of spirituality:      [] Not able to assess:                Identified resources for coping and support system:   Support System: Spouse, Children, Palliative Care       [] Prayer                  [] Devotional reading               [] Music                  [] Guided Imagery     [] Pet visits                                        [] Other: (COMMENT)     Specific area/focus of visit   Encounter:    Crisis:    Spiritual/Emotional needs: Type: Spiritual Support  Ritual, Rites and Sacraments:    Grief, Loss, and Adjustments: Type: Adjustment to illness  Ethics/Mediation:    Behavioral Health:    Palliative Care: Type: Palliative Care, Initial/Spiritual Assessment  Advance Care Planning:      I visited Dave Phelan for a palliative initial spiritual assessment. His chart was consulted prior to the visit.   He is  and the couple have two children, a son and a daughter. His daughter was at bedside; his wife and son were out of the room at the time of my visit.   He spoke of his illness and hopes of relief from symptoms and a season of healing and health.   Chaplain Mathieu, Jonathan, MS, BCC

## 2024-03-14 NOTE — PROGRESS NOTES
0900: Orders received for pre-void bladder scan. Pt had large episode of incontinence. Pt cleaned and brief changed.   Post-void bladder scan showed 0 ml.

## 2024-03-14 NOTE — PROGRESS NOTES
PULMONARY/CRITICAL CARE/SLEEP MEDICINE    Progress Note    Name: iKngsley Wilson   : 1945   MRN: [id number]   Date: 3/14/2024      Subjective:     3/14  P/F ratio yesterday was 59mmHg   Remains at 60lpm and 90% FIO2   Chest film shows more a dense consolidation, RLL was ok; could not see effusion  No WOB but looks fatigued    Creatine up  WBC trending up     3/13  Now on 60lpm and 90% Fio2  Looks quite fatigued      Consult Note: 3/12    This patient has been seen and evaluated at the request of Dr. Tavarez for respiratory failure. Medical records and data reviewed.  Patient is a 78 y.o. male who presented to the hospital with complaints of shortness of breath from inpatient rehab.   Patient was hospitalized at [facility] and discharged on  after treatment of cellulitis and infection of lower extremity wound after receiving a washout procedure.  He was sent home with a PICC line on 2 weeks of IV daptomycin and ceftriaxone.  PICC line was inserted for IV antibiotics as an outpatient.  X-ray from the day of PICC line insertion on  cannot be pulled up for review.  He was subsequently admitted on the  with worsening dyspnea and cough.  X-ray on admission showed multifocal infiltrates with dense consolidation of right upper lobe.  X-ray this morning has shown evolution with consolidative airspace disease in the left mid lung fields as well.  He has had worsening oxygenation and is requiring high flow nasal oxygen.  ABG that was done did not show presence of respiratory acidosis and metabolic acidosis from underlying renal failure was noted.  Peripheral eosinophil count is not elevated.  He has had streaking hemoptysis.  He is currently therapeutically anticoagulated.    He quit smoking 28 years ago.  He does not have a history of chronic lung disease.  He has had childhood asthma and seasonal allergies for which she has been receiving montelukast.  He is not followed  Improving left basilar aeration. Improving patchy right upper lobe airspace disease. Stable small bilateral pleural effusions     US RETROPERITONEAL COMPLETE    Result Date: 3/10/2024  EXAM: US RETROPERITONEAL COMPLETE INDICATION: Acute kidney injury. COMPARISON: None. TECHNIQUE: Real-time sonography of the kidneys, retroperitoneum and bladder was performed with multiple static images obtained. FINDINGS: RIGHT KIDNEY: The right kidney has increased echogenicity with no mass, stone or hydronephrosis. The right kidney measures 13.6 cm in length. LEFT KIDNEY: The left kidney has increased echogenicity with  no mass, stone or hydronephrosis. The left kidney measures 14.2 cm in length. RETROPERITONEUM: Aorta is obscured by overlying bowel gas. The IVC is normal. No retroperitoneal mass is identified. BLADDER: Mild debris in the bladder.     Echogenic kidneys compatible with medical renal disease. Mild debris in the urinary bladder.     XR CHEST PORTABLE    Result Date: 3/9/2024  EXAM:  XR CHEST PORTABLE INDICATION: Shortness of breath, sepsis COMPARISON: 8 months ago TECHNIQUE: portable chest AP view FINDINGS: The cardiac silhouette is within normal limits. The pulmonary vasculature is within normal limits. Lung volumes are low to moderate, with extensive acute infiltrate in the right upper lobe and moderate airspace disease in the left lung base. A right PICC line shows its tip over the superior vena cava. There is no pneumothorax. The visualized bones and upper abdomen are age-appropriate, except for a stable displaced nonacute left clavicular fracture..     1. Severe airspace disease in the right upper lung field. 2 view examination would be helpful when clinically possible. 2. Acute airspace disease less severe in the left lung base. Two-view chest x-ray would be helpful. 3. Right PICC line.     XR CHEST 1 VIEW    Result Date: 3/7/2024  STUDY:  XR CHEST 1 VIEW. DATE OF STUDY: 3/7/2024 at 0830 hours HISTORY: I35.0:

## 2024-03-14 NOTE — CONSULTS
CRITICAL CARE NOTE    Name: Kingsley Wilson   : 1945   MRN: [id number]   Date: 3/14/2024      REASON FOR ICU ADMISSION:  Acute hypoxic repsiratory failure with extensive bilateral infiltrates      HPI/PATIENT SUMMARY   78 M suffered major R ankle fx 2023 requiring ORIF. More recently has had problems with wound infection and septic arthritis ultimately requiring removal of hardware 2024 (hospitalized @ [facility] -24 @ [facility]). That hospitalization was c/b RLE DVT and he was discharged on apixaban. PMH also includes Factor V Leiden, h/o TAVR, COPD, peripheral neuropathy.he was admitted this hospitalization  to the  Service with worsening dyspnea, hypoxemia and extensive B infiltrates on CXR. Admission diagnosis was PNA with acute hypoxic respiratory failure and he has been treated with NIPPV, broad spectrum antibiotics, systemic steroids. He has been seen by Pulmonary Medicine and ID Service with no specific diagnosis rendered. During this hospitalization he has developed worsening renal failure and in followed by Nephrology. On  [facility] Service was asked to evaluate the patient and he appeared reasonably comfortable on FNC. However, his steroid dose was increased  and over the course of the night he developed increased agitation and delirium with increased appearance of dyspnea prompting transfer to the ICU       HOSPITAL COURSE/DAILY EVENT LOG               COMPREHENSIVE ASSESSMENT & PLAN:SYSTEM BASED     24 HOUR EVENTS: As above    NEUROLOGICAL:  Acute agitated delirium - mostly nocturnal. Likely steroid induced  Severe deconditioning   ICU monitoring  Initiate low dose quetiapine   Initiate low dose PRN haloperidol   Steroid dose reduced     PULMONOLOGY:  Severe acute hypoxic respiratory failure  Extensive bilateral pulmonary infiltrates - unclear etiology       Appear inflammatory - infectious vs noninfectious   Cont [facility]  Can consider NIPPV if he will

## 2024-03-14 NOTE — CONSULTS
Palliative Medicine  Patient Name: Kingsley Wilson  YOB: 1945  MRN: [id number]  Age: 78 y.o.  Gender: male    Date of Initial Consult: 3/14/24  Date of Service: 3/14/2024  Time: 1:01 PM  Provider: Jud Flowers MD  Hospital Day: 6  Admit Date: 3/9/2024  Referring Provider: Dr Willis       Reasons for Consultation:  Goals of Care    HISTORY OF PRESENT ILLNESS (HPI):   Kingsley Wilson is a 78 y.o. male with a past medical history of factor V Leiden, aortic valve stenosis s/p TAVR 1/2023, HTN, neuropathy, gout, DM, R ankle fracture s/p ORIF 7/2023 who was admitted on 3/9/2024 from [facility] on 3/9. Was at [facility] 2/19-2/28/24 with R ankle cellulitis, septic arthritis and acute osteomyelitis of R distal fibula. Had washout and hardware removal by Ortho on 2/22 and was on IV abx per ID until 4/3/24. TTWB status on RLE. Also found to have DVT RLE.    Transferred from [facility] due to worsening shortness of breath and hypoxia on room air. Found to have pneumonia, required BIPAP initially. On broad spectrum IV abx and steroids.  With JUDITH- Cr baseline 0.6 and has been trending up since admission, renal following, septic ATN. Tx to ICU 3/14, remains on high flow.       Psychosocial: Pt lives w/ wife La Nena. They have 2 supportive children, both local- dtr Wu (in past was Cardiology PA), and son Swapnil. Pt has 4 grandkids. Pt's brother also lives in [place] and he and his wife have other extended family members.   Pt and wife both originally from [place]- pt went to [facility], La Nena went to [facility].   Doing all ADLs/IADLs prior to recent admission. Was doing well functionally at [facility].     PALLIATIVE DIAGNOSES:    Shortness of breath on high flow O2  Debility due to above  JUDITH   Palliative Care encounter     ASSESSMENT AND PLAN:   Today, I am treating Kingsley Wilson . I reviewed records from the following unique sources (outside institutions or providers from

## 2024-03-14 NOTE — PROGRESS NOTES
Occupational Therapy  3/14/2024    Chart reviewed in prep for OT session, noted patient with transfer to ICU for higher level of care. Will hold OT services at this time and follow up as appropriate.    Thank you,  Richa Elizabeth, BRYSON, OTR/L

## 2024-03-14 NOTE — PROGRESS NOTES
Pt just transferred to the ICU for a higher level of care. PT will hold, follow, and see as able and appropriate. Swapna Carlin, PT

## 2024-03-14 NOTE — PROGRESS NOTES
[facility]     Hospitalist Progress Note    NAME: Kingsley Wilson   : 1945   MRN: [id number]     Date/Time: 3/13/2024 9:00 PM  Patient PCP: JANUARY Aranda MD    Admission Summary:   Kingslye Wilson is a 78 y.o. male with past medical history significant for type 2 diabetes, diabetic neuropathy, gout, hypertension, dyslipidemia, history of aortic valve stenosis status post TAVR, history of factor V Leiden, COPD with recent right lower extremity DVT on Eliquis, chronic orthopedic hardware infection after open reduction internal fixation of right lateral malleolus fracture who presents to hospital from [facility] rehab for concerns of shortness of breath.  Was reportedly undergoing rehab at [facility].  He has had progressive worsening shortness of breath and today was noted to be hypoxic with O2 sats to 81% on room air.  EMS was activated and he was transported to hospital on BiPAP.     Chart review shows recent admission at [facility] from  to  for cellulitis and infection of right lower extremity wound status post washout and discharged on chronic antibiotics via PICC.  He was discharged on 2 weeks of IV daptomycin and ceftriaxone.     The patient denies any fever, chills, chest or abdominal pain, nausea, vomiting, cough, congestion, recent illness, palpitations, or dysuria.     Remarkable vitals on ER Presentation: Heart rate to 110, SpO2 81% on room air, respiratory rate to 28  Labs Remarkable for: Creatinine 2.68, BNP 2968, WBC 15.6, hemoglobin 9.1, Pro-Luis 4.05, rapid COVID and influenza negative  ER Images: Chest x-ray: Severe bilateral airspace disease.  ER Rx: DuoNebs, Zosyn, vancomycin, 2 L normal saline bolus    Assessment / Plan:     Acute hypoxic respiratory failure secondary to CAP/Aspiration pneumonia vs daptomycin induced  Severe Sepsis 2/2/ CAP  COPD exacerbation  Leukocytosis--likely sec to  acute distress    EENT:  EOMI. Anicteric sclerae. MMM  Resp:  CTA bilaterally, no wheezing or rales.  No accessory muscle use  CV:  Regular  rhythm,  No edema  GI:  Soft, Non distended, Non tender.  +Bowel sounds  Neurologic:  Alert and oriented X 3, normal speech,   Psych:   Good insight. Not anxious nor agitated  Skin:  No rashes.  No jaundice      Central Line: PICC Double Lumen 02/28/24 Right Basilic-Central Line Being Utilized: No      ________________________________________________________________________  Care Plan discussed with:    Comments   Patient x    Family      RN     Care Manager     Consultant                        Multidiciplinary team rounds were held today with , nursing, pharmacist and clinical coordinator.  Patient's plan of care was discussed; medications were reviewed and discharge planning was addressed.     ________________________________________________________________________  Total NON critical care TIME:   35  Minutes    Total CRITICAL CARE TIME Spent:   Minutes non procedure based      Comments   >50% of visit spent in counseling and coordination of care x     This includes time during multidisciplinary rounds if indicated above   ________________________________________________________________________  Hugo Tavarez MD     Procedures: see electronic medical records for all procedures/Xrays and details which were not copied into this note but were reviewed prior to creation of Plan.      LABS:  I reviewed today's most current labs and imaging studies.  Pertinent labs include:  Recent Labs     03/11/24  0549 03/13/24  0513   WBC 17.8* 18.2*   HGB 9.0* 9.9*   HCT 28.3* 31.4*    351       Recent Labs     03/11/24  0549 03/12/24  0414 03/13/24  0513    143 139   K 3.8 4.3 4.7   * 116* 115*   CO2 22 21 18*   BUN 67* 79* 81*

## 2024-03-14 NOTE — PLAN OF CARE
Problem: Discharge Planning  Goal: Discharge to home or other facility with appropriate resources  Outcome: Progressing     Problem: Skin/Tissue Integrity  Goal: Absence of new skin breakdown  Description: 1.  Monitor for areas of redness and/or skin breakdown  2.  Assess vascular access sites hourly  3.  Every 4-6 hours minimum:  Change oxygen saturation probe site  4.  Every 4-6 hours:  If on nasal continuous positive airway pressure, respiratory therapy assess nares and determine need for appliance change or resting period.  Outcome: Progressing     Problem: Safety - Adult  Goal: Free from fall injury  Outcome: Progressing     Problem: Chronic Conditions and Co-morbidities  Goal: Patient's chronic conditions and co-morbidity symptoms are monitored and maintained or improved  Outcome: Progressing     Problem: Occupational Therapy - Adult  Goal: By Discharge: Performs self-care activities at highest level of function for planned discharge setting.  See evaluation for individualized goals.  Description: FUNCTIONAL STATUS PRIOR TO ADMISSION:  Patient was ambulatory using no AD after recent R lateral malleolus fracture s/p ORIF yet sustained a hardware infection and has been hospitalized and at [facility] for @1 week then developed respiratory failure --tx to [facility]. He was performing simple ADL I to mod I and he and wife were performing I-ADL together.  Receives Help From: Family,  ,  ,  ,  ,  ,  ,  ,  ,  ,       HOME SUPPORT: Patient lived wife wife with wife to provide assistance.    Occupational Therapy Goals:  Initiated 3/11/2024  1.  Patient will perform dynamic sitting balance activities EOB and no posterior LOB with stable vitals in prep for ADL tasks with Stand by Assist within 7 day(s).  2.  Patient will perform grooming with Set-up EOB within 7 day(s).  3.  Patient will perform UB bathing EOB with Contact Guard Assist and Minimal Assist within 7 day(s).  4.  Patient will perform donning underpants with AE use in

## 2024-03-15 ENCOUNTER — APPOINTMENT (OUTPATIENT)
Facility: HOSPITAL | Age: 79
End: 2024-03-15
Attending: INTERNAL MEDICINE
Payer: MEDICARE

## 2024-03-15 VITALS
SYSTOLIC BLOOD PRESSURE: 85 MMHG | HEIGHT: 73 IN | WEIGHT: 217.15 LBS | BODY MASS INDEX: 28.78 KG/M2 | DIASTOLIC BLOOD PRESSURE: 44 MMHG | TEMPERATURE: 97.4 F

## 2024-03-15 LAB
ANION GAP SERPL CALC-SCNC: 5 MMOL/L (ref 5–15)
BASOPHILS # BLD: 0 K/UL (ref 0–0.1)
BASOPHILS NFR BLD: 0 % (ref 0–1)
BUN SERPL-MCNC: 87 MG/DL (ref 6–20)
BUN/CREAT SERPL: 26 (ref 12–20)
CALCIUM SERPL-MCNC: 8.3 MG/DL (ref 8.5–10.1)
CHLORIDE SERPL-SCNC: 118 MMOL/L (ref 97–108)
CO2 SERPL-SCNC: 23 MMOL/L (ref 21–32)
CREAT SERPL-MCNC: 3.39 MG/DL (ref 0.7–1.3)
DIFFERENTIAL METHOD BLD: ABNORMAL
EOSINOPHIL # BLD: 0 K/UL (ref 0–0.4)
EOSINOPHIL NFR BLD: 0 % (ref 0–7)
ERYTHROCYTE [DISTWIDTH] IN BLOOD BY AUTOMATED COUNT: 15 % (ref 11.5–14.5)
GLUCOSE BLD STRIP.AUTO-MCNC: 210 MG/DL (ref 65–117)
GLUCOSE SERPL-MCNC: 191 MG/DL (ref 65–100)
HCT VFR BLD AUTO: 27.9 % (ref 36.6–50.3)
HGB BLD-MCNC: 8.9 G/DL (ref 12.1–17)
IMM GRANULOCYTES # BLD AUTO: 0 K/UL
IMM GRANULOCYTES NFR BLD AUTO: 0 %
LYMPHOCYTES # BLD: 0.9 K/UL (ref 0.8–3.5)
LYMPHOCYTES NFR BLD: 6 % (ref 12–49)
MAGNESIUM SERPL-MCNC: 2.4 MG/DL (ref 1.6–2.4)
MCH RBC QN AUTO: 29.5 PG (ref 26–34)
MCHC RBC AUTO-ENTMCNC: 31.9 G/DL (ref 30–36.5)
MCV RBC AUTO: 92.4 FL (ref 80–99)
MONOCYTES # BLD: 0.3 K/UL (ref 0–1)
MONOCYTES NFR BLD: 2 % (ref 5–13)
NEUTS SEG # BLD: 13.9 K/UL (ref 1.8–8)
NEUTS SEG NFR BLD: 92 % (ref 32–75)
NRBC # BLD: 0 K/UL (ref 0–0.01)
NRBC BLD-RTO: 0 PER 100 WBC
NT PRO BNP: 6569 PG/ML
PHOSPHATE SERPL-MCNC: 6.1 MG/DL (ref 2.6–4.7)
PLATELET # BLD AUTO: 275 K/UL (ref 150–400)
PMV BLD AUTO: 11.5 FL (ref 8.9–12.9)
POTASSIUM SERPL-SCNC: 5 MMOL/L (ref 3.5–5.1)
PROCALCITONIN SERPL-MCNC: 0.31 NG/ML
RBC # BLD AUTO: 3.02 M/UL (ref 4.1–5.7)
RBC MORPH BLD: ABNORMAL
SERVICE CMNT-IMP: ABNORMAL
SODIUM SERPL-SCNC: 146 MMOL/L (ref 136–145)
WBC # BLD AUTO: 15.1 K/UL (ref 4.1–11.1)

## 2024-03-15 PROCEDURE — 94660 CPAP INITIATION&MGMT: CPT

## 2024-03-15 PROCEDURE — 2580000003 HC RX 258: Performed by: INTERNAL MEDICINE

## 2024-03-15 PROCEDURE — 6360000002 HC RX W HCPCS: Performed by: INTERNAL MEDICINE

## 2024-03-15 PROCEDURE — 94640 AIRWAY INHALATION TREATMENT: CPT

## 2024-03-15 PROCEDURE — 82962 GLUCOSE BLOOD TEST: CPT

## 2024-03-15 PROCEDURE — 2700000000 HC OXYGEN THERAPY PER DAY

## 2024-03-15 PROCEDURE — C9113 INJ PANTOPRAZOLE SODIUM, VIA: HCPCS | Performed by: INTERNAL MEDICINE

## 2024-03-15 PROCEDURE — 99232 SBSQ HOSP IP/OBS MODERATE 35: CPT | Performed by: INTERNAL MEDICINE

## 2024-03-15 PROCEDURE — 2500000003 HC RX 250 WO HCPCS: Performed by: INTERNAL MEDICINE

## 2024-03-15 PROCEDURE — 85025 COMPLETE CBC W/AUTO DIFF WBC: CPT

## 2024-03-15 PROCEDURE — 6360000002 HC RX W HCPCS: Performed by: PHYSICAL MEDICINE & REHABILITATION

## 2024-03-15 PROCEDURE — 6370000000 HC RX 637 (ALT 250 FOR IP): Performed by: HOSPITALIST

## 2024-03-15 PROCEDURE — A4216 STERILE WATER/SALINE, 10 ML: HCPCS | Performed by: INTERNAL MEDICINE

## 2024-03-15 PROCEDURE — 83880 ASSAY OF NATRIURETIC PEPTIDE: CPT

## 2024-03-15 PROCEDURE — 36415 COLL VENOUS BLD VENIPUNCTURE: CPT

## 2024-03-15 PROCEDURE — 80048 BASIC METABOLIC PNL TOTAL CA: CPT

## 2024-03-15 PROCEDURE — 94761 N-INVAS EAR/PLS OXIMETRY MLT: CPT

## 2024-03-15 PROCEDURE — 83735 ASSAY OF MAGNESIUM: CPT

## 2024-03-15 PROCEDURE — 84145 PROCALCITONIN (PCT): CPT

## 2024-03-15 PROCEDURE — 2580000003 HC RX 258: Performed by: STUDENT IN AN ORGANIZED HEALTH CARE EDUCATION/TRAINING PROGRAM

## 2024-03-15 PROCEDURE — 84100 ASSAY OF PHOSPHORUS: CPT

## 2024-03-15 RX ORDER — MIDAZOLAM HYDROCHLORIDE 2 MG/2ML
5 INJECTION, SOLUTION INTRAMUSCULAR; INTRAVENOUS
Status: DISCONTINUED | OUTPATIENT
Start: 2024-03-15 | End: 2024-03-15

## 2024-03-15 RX ORDER — GLYCOPYRROLATE 0.2 MG/ML
0.2 INJECTION INTRAMUSCULAR; INTRAVENOUS EVERY 4 HOURS PRN
Status: DISCONTINUED | OUTPATIENT
Start: 2024-03-15 | End: 2024-03-15 | Stop reason: HOSPADM

## 2024-03-15 RX ORDER — BUMETANIDE 0.25 MG/ML
2 INJECTION INTRAMUSCULAR; INTRAVENOUS ONCE
Status: COMPLETED | OUTPATIENT
Start: 2024-03-15 | End: 2024-03-15

## 2024-03-15 RX ORDER — MORPHINE SULFATE 2 MG/ML
2 INJECTION, SOLUTION INTRAMUSCULAR; INTRAVENOUS ONCE
Status: COMPLETED | OUTPATIENT
Start: 2024-03-15 | End: 2024-03-15

## 2024-03-15 RX ORDER — MIDAZOLAM HYDROCHLORIDE 2 MG/2ML
2 INJECTION, SOLUTION INTRAMUSCULAR; INTRAVENOUS
Status: DISCONTINUED | OUTPATIENT
Start: 2024-03-15 | End: 2024-03-15 | Stop reason: HOSPADM

## 2024-03-15 RX ORDER — HYDROMORPHONE HYDROCHLORIDE 2 MG/ML
2 INJECTION, SOLUTION INTRAMUSCULAR; INTRAVENOUS; SUBCUTANEOUS
Status: DISCONTINUED | OUTPATIENT
Start: 2024-03-15 | End: 2024-03-15 | Stop reason: HOSPADM

## 2024-03-15 RX ORDER — HYDROMORPHONE HYDROCHLORIDE 1 MG/ML
1 INJECTION, SOLUTION INTRAMUSCULAR; INTRAVENOUS; SUBCUTANEOUS
Status: DISCONTINUED | OUTPATIENT
Start: 2024-03-15 | End: 2024-03-15 | Stop reason: HOSPADM

## 2024-03-15 RX ORDER — MORPHINE SULFATE 4 MG/ML
4 INJECTION, SOLUTION INTRAMUSCULAR; INTRAVENOUS ONCE
Status: COMPLETED | OUTPATIENT
Start: 2024-03-15 | End: 2024-03-15

## 2024-03-15 RX ORDER — HYDROMORPHONE HYDROCHLORIDE 2 MG/ML
2 INJECTION, SOLUTION INTRAMUSCULAR; INTRAVENOUS; SUBCUTANEOUS
Status: DISCONTINUED | OUTPATIENT
Start: 2024-03-15 | End: 2024-03-15

## 2024-03-15 RX ORDER — DEXTROSE MONOHYDRATE 50 MG/ML
INJECTION, SOLUTION INTRAVENOUS CONTINUOUS
Status: DISCONTINUED | OUTPATIENT
Start: 2024-03-15 | End: 2024-03-15 | Stop reason: HOSPADM

## 2024-03-15 RX ORDER — METOPROLOL TARTRATE 1 MG/ML
5 INJECTION, SOLUTION INTRAVENOUS EVERY 8 HOURS
Status: DISCONTINUED | OUTPATIENT
Start: 2024-03-15 | End: 2024-03-15 | Stop reason: HOSPADM

## 2024-03-15 RX ADMIN — Medication: at 11:38

## 2024-03-15 RX ADMIN — MIDAZOLAM 2 MG: 1 INJECTION INTRAMUSCULAR; INTRAVENOUS at 11:55

## 2024-03-15 RX ADMIN — DEXTROSE MONOHYDRATE: 50 INJECTION, SOLUTION INTRAVENOUS at 08:43

## 2024-03-15 RX ADMIN — HYDROMORPHONE HYDROCHLORIDE 2 MG: 2 INJECTION, SOLUTION INTRAMUSCULAR; INTRAVENOUS; SUBCUTANEOUS at 15:40

## 2024-03-15 RX ADMIN — MORPHINE SULFATE 2 MG: 2 INJECTION, SOLUTION INTRAMUSCULAR; INTRAVENOUS at 09:39

## 2024-03-15 RX ADMIN — MORPHINE SULFATE 2 MG: 2 INJECTION, SOLUTION INTRAMUSCULAR; INTRAVENOUS at 10:25

## 2024-03-15 RX ADMIN — HYDROMORPHONE HYDROCHLORIDE 1 MG: 1 INJECTION, SOLUTION INTRAMUSCULAR; INTRAVENOUS; SUBCUTANEOUS at 11:07

## 2024-03-15 RX ADMIN — METOPROLOL TARTRATE 5 MG: 1 INJECTION, SOLUTION INTRAVENOUS at 08:45

## 2024-03-15 RX ADMIN — SODIUM CHLORIDE, PRESERVATIVE FREE 40 ML: 5 INJECTION INTRAVENOUS at 08:34

## 2024-03-15 RX ADMIN — DEXMEDETOMIDINE HYDROCHLORIDE 1.5 MCG/KG/HR: 400 INJECTION, SOLUTION INTRAVENOUS at 10:56

## 2024-03-15 RX ADMIN — DEXMEDETOMIDINE HYDROCHLORIDE 1.5 MCG/KG/HR: 400 INJECTION, SOLUTION INTRAVENOUS at 05:33

## 2024-03-15 RX ADMIN — MIDAZOLAM 2 MG: 1 INJECTION INTRAMUSCULAR; INTRAVENOUS at 11:08

## 2024-03-15 RX ADMIN — DEXMEDETOMIDINE HYDROCHLORIDE 1.5 MCG/KG/HR: 400 INJECTION, SOLUTION INTRAVENOUS at 14:03

## 2024-03-15 RX ADMIN — MORPHINE SULFATE 4 MG: 4 INJECTION, SOLUTION INTRAMUSCULAR; INTRAVENOUS at 10:55

## 2024-03-15 RX ADMIN — BUMETANIDE 2 MG: 0.25 INJECTION INTRAMUSCULAR; INTRAVENOUS at 08:43

## 2024-03-15 RX ADMIN — DEXMEDETOMIDINE HYDROCHLORIDE 1.5 MCG/KG/HR: 400 INJECTION, SOLUTION INTRAVENOUS at 02:44

## 2024-03-15 RX ADMIN — LINEZOLID 600 MG: 600 INJECTION, SOLUTION INTRAVENOUS at 04:21

## 2024-03-15 RX ADMIN — MIDAZOLAM 2 MG: 1 INJECTION INTRAMUSCULAR; INTRAVENOUS at 15:40

## 2024-03-15 RX ADMIN — INSULIN LISPRO 1 UNITS: 100 INJECTION, SOLUTION INTRAVENOUS; SUBCUTANEOUS at 08:50

## 2024-03-15 RX ADMIN — DEXMEDETOMIDINE HYDROCHLORIDE 1.3 MCG/KG/HR: 400 INJECTION, SOLUTION INTRAVENOUS at 08:35

## 2024-03-15 RX ADMIN — HYDROMORPHONE HYDROCHLORIDE 2 MG: 2 INJECTION, SOLUTION INTRAMUSCULAR; INTRAVENOUS; SUBCUTANEOUS at 12:20

## 2024-03-15 RX ADMIN — PANTOPRAZOLE SODIUM 40 MG: 40 INJECTION, POWDER, FOR SOLUTION INTRAVENOUS at 08:33

## 2024-03-15 RX ADMIN — MEROPENEM 500 MG: 500 INJECTION, POWDER, FOR SOLUTION INTRAVENOUS at 04:44

## 2024-03-15 RX ADMIN — ARFORMOTEROL TARTRATE: 15 SOLUTION RESPIRATORY (INHALATION) at 08:28

## 2024-03-15 ASSESSMENT — PAIN SCALES - GENERAL
PAINLEVEL_OUTOF10: 0
PAINLEVEL_OUTOF10: 0

## 2024-03-15 NOTE — PROGRESS NOTES
Paged by Modesto Wilson' nurse and informed of his death.  Family present at bedside. Pt's wife and family reflected on  pt's illness and how pt was able to express his wishes regarding end of life care.  Affirmed and normalized their emotions as they grieve his death. Explored any additional needs; none expressed at this time. Assured them of ongoing prayers. Family remains at bedside.     Chaplains remain available upon further referral if additional needs arise.     Melva Metcalf Mdiv, [facility]  [phone]

## 2024-03-15 NOTE — PROGRESS NOTES
Physical Therapy Note  03/15/2024    Patient currently on PT caseload. MD just in room talking with family, discussed with nursing and plans are now for patient to go comfort care. Nursing requesting therapy to sign off at this time, please re-consult if GOC change.    Thank you,  Nona Wise, PT, DPT

## 2024-03-15 NOTE — PROGRESS NOTES
Palliative Medicine  Patient Name: Kingsley Wilson  YOB: 1945  MRN: [id number]  Age: 78 y.o.  Gender: male    Date of Initial Consult: 3/14/24  Date of Service: 3/15/2024  Time: 11:34 AM  Provider: Jud Flowers MD  Hospital Day: 7  Admit Date: 3/9/2024  Referring Provider: Dr Willis       Reasons for Consultation:  Goals of Care    HISTORY OF PRESENT ILLNESS (HPI):   Kingsley Wilson is a 78 y.o. male with a past medical history of factor V Leiden, aortic valve stenosis s/p TAVR 1/2023, HTN, neuropathy, gout, DM, R ankle fracture s/p ORIF 7/2023 who was admitted on 3/9/2024 from [facility] on 3/9. Was at [facility] 2/19-2/28/24 with R ankle cellulitis, septic arthritis and acute osteomyelitis of R distal fibula. Had washout and hardware removal by Ortho on 2/22 and was on IV abx per ID until 4/3/24. TTWB status on RLE. Also found to have DVT RLE.    Transferred from [facility] due to worsening shortness of breath and hypoxia on room air. Found to have pneumonia, required BIPAP initially. On broad spectrum IV abx and steroids.  With JUDITH- Cr baseline 0.6 and has been trending up since admission, renal following, septic ATN. Tx to ICU 3/14, remains on high flow.     3/15- Worsening renal function, on/off BIPAP and high flow, short of breath.      Psychosocial: Pt lives w/ wife La Nena. They have 2 supportive children, both local- dtr Wu (in past was Cardiology PA), and son Swapnil. Pt has 4 grandkids. Pt's brother also lives in [place] and he and his wife have other extended family members.   Pt and wife both originally from [place]- pt went to [facility], La Nena went to [facility].   Doing all ADLs/IADLs prior to recent admission. Was doing well functionally at [facility].     PALLIATIVE DIAGNOSES:    Shortness of breath on high flow O2  Debility due to above  JUDITH   Palliative Care encounter     ASSESSMENT AND PLAN:   Discuss pt care with Dr Willis, Alessio RN, Malena pharm

## 2024-03-15 NOTE — PROGRESS NOTES
POC HCO3 23.3 22 - 26 MMOL/L    POC O2 SAT 85.5 (L) 92 - 97 %    Base Deficit 2.6 mmol/L    Site LEFT RADIAL      DEVICE High Flow Nasal Cannula      POC Domingo's Test Positive      Specimen type: ARTERIAL     POCT Glucose    Collection Time: 03/14/24  9:08 PM   Result Value Ref Range    POC Glucose 184 (H) 65 - 117 mg/dL    Performed by: Deedee Barrios RN    Phosphorus    Collection Time: 03/15/24  4:28 AM   Result Value Ref Range    Phosphorus 6.1 (H) 2.6 - 4.7 MG/DL   Magnesium    Collection Time: 03/15/24  4:28 AM   Result Value Ref Range    Magnesium 2.4 1.6 - 2.4 mg/dL   Basic Metabolic Panel    Collection Time: 03/15/24  4:28 AM   Result Value Ref Range    Sodium 146 (H) 136 - 145 mmol/L    Potassium 5.0 3.5 - 5.1 mmol/L    Chloride 118 (H) 97 - 108 mmol/L    CO2 23 21 - 32 mmol/L    Anion Gap 5 5 - 15 mmol/L    Glucose 191 (H) 65 - 100 mg/dL    BUN 87 (H) 6 - 20 MG/DL    Creatinine 3.39 (H) 0.70 - 1.30 MG/DL    Bun/Cre Ratio 26 (H) 12 - 20      Est, Glom Filt Rate 18 (L) >60 ml/min/1.73m2    Calcium 8.3 (L) 8.5 - 10.1 MG/DL   CBC with Auto Differential    Collection Time: 03/15/24  4:28 AM   Result Value Ref Range    WBC 15.1 (H) 4.1 - 11.1 K/uL    RBC 3.02 (L) 4.10 - 5.70 M/uL    Hemoglobin 8.9 (L) 12.1 - 17.0 g/dL    Hematocrit 27.9 (L) 36.6 - 50.3 %    MCV 92.4 80.0 - 99.0 FL    MCH 29.5 26.0 - 34.0 PG    MCHC 31.9 30.0 - 36.5 g/dL    RDW 15.0 (H) 11.5 - 14.5 %    Platelets 275 150 - 400 K/uL    MPV 11.5 8.9 - 12.9 FL    Nucleated RBCs 0.0 0  WBC    nRBC 0.00 0.00 - 0.01 K/uL    Neutrophils % 92 (H) 32 - 75 %    Lymphocytes % 6 (L) 12 - 49 %    Monocytes % 2 (L) 5 - 13 %    Eosinophils % 0 0 - 7 %    Basophils % 0 0 - 1 %    Immature Granulocytes 0 %    Neutrophils Absolute 13.9 (H) 1.8 - 8.0 K/UL    Lymphocytes Absolute 0.9 0.8 - 3.5 K/UL    Monocytes Absolute 0.3 0.0 - 1.0 K/UL    Eosinophils Absolute 0.0 0.0 - 0.4 K/UL    Basophils Absolute 0.0 0.0 - 0.1 K/UL    Absolute Immature  Granulocyte 0.0 K/UL    Differential Type MANUAL      RBC Comment POLYCHROMASIA  PRESENT       Brain Natriuretic Peptide    Collection Time: 03/15/24  4:28 AM   Result Value Ref Range    NT Pro-BNP 6,569 (H) <450 PG/ML   Procalcitonin    Collection Time: 03/15/24  4:28 AM   Result Value Ref Range    Procalcitonin 0.31 ng/mL   POCT Glucose    Collection Time: 03/15/24  8:47 AM   Result Value Ref Range    POC Glucose 210 (H) 65 - 117 mg/dL    Performed by: WILL KAHN            Micro:     Blood: No positive blood cultures      Urine:     Synovial/Joint fluid:     Sputum/BAL/Pleural: No positive respiratory cultures     Peritoneal:      Pathology:      Imaging:    CXR 03/14/24  Bilateral airspace disease consistent with pneumonia involving the majority of the left lung in the right upper lobe is unchanged aeration improved in the right lower lobe  Fairly severe bilateral infiltrates have not changed    Assessment / Plan     1.  Severe acute hypoxic respiratory failure secondary to extensive pulmonary infiltrates        Likely combination of inflammatory/infectious        History of COPD past smoker   2.  Cellulitis of the right leg right foot injury right ankle wound with drainage on admit   3.  Multiple medical co-morbidities DM2,HTN, HLD,gout,TAVR   4.  Patient with increasing difficulty with respiration and work of breathing          Decreased oxygen saturations   5.   Palliative care consultation called                   Thank you for the opportunity to participate in the care of this patient.   Please contact with questions or concerns.      Estefania Landeros MD

## 2024-03-15 NOTE — PROGRESS NOTES
Occupational Therapy  03/15/24     Patient currently on OT caseload. MD just in room talking with family, discussed with nursing and plans are now for patient to go comfort care. Nursing requesting therapy to sign off at this time, please re-consult if GOC change.     Thank you,  Richa Lu, OTR/L

## 2024-03-15 NOTE — DISCHARGE SUMMARY
[facility]  DEATH SUMMARY    Name: Kingsley Wilson   : 1945   MRN: [id number]   Date: 3/15/2024      DATE OF ADMISSION: 24  DATE OF DISCHARGE/DEATH: 03/15/24      ADMISSION DIAGNOSES:  Acute hypoxic reps failure  Severe sepsis due to CAP  COPD exacerbation  JUDITH  Chronic RLL malleolus wound   Factor V Leide positive  NIDDM II  Gout  Hypertension  Dyslipidemia      DISCHARGE DIAGNOSES:   Recent removal of R ankle hardware due to septic arthritis  Severe acute hypoxic respiratory failure  Extensive bilateral pulmonary infiltrates - unclear etiology       Appear inflammatory - infectious vs noninfectious  Acute agitated delirium  Severe deconditioning  H/O TAVR  Hypertension  JUDITH, nonoliguric  Mild hypernatremia  Steroid induced hyperglycemia  Hospital acquired anemia without overt blood loss  Factor V Leiden + on chronic apixaban  Recently diagnosed RLE DVT  Systemic inflammatory response syndrome  Suspected sepsis  Suspected pneumonia   Leukocytosis   Mildly elevated PCT    BRIEF SUMMARY OF ADMISSION:  Pt was admitted to  Service with the following HPI and assessment and plan:  History of Presenting Illness:  Kingsley Wilson is a 78 y.o. male with past medical history significant for type 2 diabetes, diabetic neuropathy, gout, hypertension, dyslipidemia, history of aortic valve stenosis status post TAVR, history of factor V Leiden, COPD with recent right lower extremity DVT on Eliquis, chronic orthopedic hardware infection after open reduction internal fixation of right lateral malleolus fracture who presents to hospital from [facility] rehab for concerns of shortness of breath.  Was reportedly undergoing rehab at [facility]. He has had progressive worsening shortness of breath and today was noted to be hypoxic with O2 sats to 81% on room air.  EMS was activated and he was transported to hospital on BiPAP. Chart review shows recent admission at [facility] from

## 2024-03-15 NOTE — PROGRESS NOTES
I accompanied palliative Dr. Flowers for a meeting with Kingsley Wilson and his family. His wife La Nena, daughter Wu and son Sherwin were all present. The patient has chosen to transition to comfort care and his family are supporting him. They are processing the anticipatory grief incumbent to such a loss.   Chaplain Mathieu, MDiv, MS, BCC

## 2024-03-15 NOTE — DEATH NOTES
Death Pronouncement Note  Patient's Name: Kingsley Wilson   Patient's YOB: 1945  MRN Number: [id number]    Admitting Provider: Jasvir Leos MD  Attending Provider: Walker Willis MD       Death Declaration         [facility]    Pt Name  Kingsley Wilson   Admit date:  3/9/2024   Date and time of death:  03/15/24 @ 1634   Room Number  7117/01    Medical Record Number  [id number] @ [facility]   Age  78 y.o.   Date of Birth 1945   PCP JANUARY Aranda MD   Attending physician Walker Willis MD      Code Status  DNR    Patient seen and examined     Mental status   Unresponsive    Pupils Dilated and Fixed    Respiration Nil    Pulse  Absent     Heart Sounds  Absent    Rhythm  Flat line   Family  At bedside   Chaplan Service  Notified by Nursing staff     Death certificate and discharge summary completion remain  Walker Rae MD's responsibility.                                 3/15/2024

## 2024-03-15 NOTE — CARE COORDINATION
Transition of Care Plan:     RUR: 25% High   Prior Level of Functioning: Rehab at [facility]   Disposition: Hospice   IM/IMM Medicare 3/9/24 :   Is patient a [place] and connected with [facility]? No  Caregiver Contact: wife - La Nena Wilson - [phone]   Discharge Caregiver contacted prior to discharge? Yes  Consult received for Hospice. Referral to [facility] made through [facility].   Beulah Costa RN,Care Management

## 2024-03-15 NOTE — PROGRESS NOTES
CRITICAL CARE NOTE    Name: Kingsley Wilson   : 1945   MRN: [id number]   Date: 3/15/2024      REASON FOR ICU ADMISSION:  Acute hypoxic repsiratory failure with extensive bilateral infiltrates      HPI/PATIENT SUMMARY   78 M suffered major R ankle fx 2023 requiring ORIF. More recently has had problems with wound infection and septic arthritis ultimately requiring removal of hardware 2024 (hospitalized @ [facility] -24 @ [facility]). That hospitalization was c/b RLE DVT and he was discharged on apixaban. PMH also includes Factor V Leiden, h/o TAVR, COPD, peripheral neuropathy.he was admitted this hospitalization  to the  Service with worsening dyspnea, hypoxemia and extensive B infiltrates on CXR. Admission diagnosis was PNA with acute hypoxic respiratory failure and he has been treated with NIPPV, broad spectrum antibiotics, systemic steroids. He has been seen by Pulmonary Medicine and ID Service with no specific diagnosis rendered. During this hospitalization he has developed worsening renal failure and in followed by Nephrology. On  [facility] Service was asked to evaluate the patient and he appeared reasonably comfortable on HHFNC. However, his steroid dose was increased  and over the course of the night he developed increased agitation and delirium with increased appearance of dyspnea prompting transfer to the ICU  Extended discussion regarding GOC with pt and separately with wife and daughter (who is a retired PA). They understand that prognosis is guarded and that if he were to be intubated, there is no guarantee that he could improve sufficiently to be successfully extubated. They also understand that survival from a severe and/or prolonged critical illness always involves some long term consequences - physical, psychiatric, cognitive. I recommended that we should, if necessary, proceed with intubation but not prolonged vent support that would require trach tube

## 2024-03-15 NOTE — PROGRESS NOTES
RENAL  PROGRESS NOTE        Subjective:    Decompensated overnight  Family at bedside  Objective:   VITALS SIGNS:    BP (!) 160/86   Pulse 83   Temp 97.4 °F (36.3 °C) (Axillary)   Resp 22   Ht 1.854 m (6' 1\")   Wt 98.5 kg (217 lb 2.5 oz)   SpO2 (!) 88%   BMI 28.65 kg/m²             Temp (24hrs), Av °F (36.7 °C), Min:97.4 °F (36.3 °C), Max:98.5 °F (36.9 °C)         PHYSICAL EXAM:  Abd distended    DATA REVIEW:     INTAKE / OUTPUT:   Last shift:      03/15 07 - 03/15 1900  In: -   Out: 125 [Urine:125]  Last 3 shifts: 1901 - 03/15 0700  In: -   Out: 1170 [Urine:1170]    Intake/Output Summary (Last 24 hours) at 3/15/2024 0950  Last data filed at 3/15/2024 0800  Gross per 24 hour   Intake --   Output 1295 ml   Net -1295 ml           LABS:   LABS:  Recent Labs     03/15/24  0428 03/14/24  0430 24  0513 24  0549 03/10/24  0418 24  1127 24  0459   * 146* 139   < > 140 138 141   K 5.0 4.8 4.7   < > 4.1 4.2 4.0   * 120* 115*   < > 111* 108 106   CO2 23 19* 18*   < > 25 26 31   BUN 87* 85* 81*   < > 48* 39* 12   CREATININE 3.39* 3.25* 3.22*   < > 2.84* 2.68* 0.66*   CALCIUM 8.3* 8.8 8.5   < > 8.8 8.5 9.1   LABALBU  --   --   --   --  2.0* 2.0* 2.6*   PHOS 6.1* 5.0*  --   --   --   --  3.1   MG 2.4 2.5*  --   --   --  2.1 1.6    < > = values in this interval not displayed.       Recent Labs     03/15/24  0428 03/14/24  0430 24  0513   WBC 15.1* 20.1* 18.2*   HGB 8.9* 9.5* 9.9*   HCT 27.9* 29.6* 31.4*    321 351       No results for input(s): \"MARIS\", \"KU\", \"CLU\", \"CREAU\" in the last 720 hours.    Invalid input(s): \"PROU\"      Assessment:        A/P:  JUDITH  2nd septic ATN,PNA;to note one episode of hemoptysis(after + cough);creat up   microhematuria    hypernatremia                            Foot infection  PNA,resp failure  Obesity           I had a long discussion with daughter and patient PCP  Daughter said they decide NO dialysis and no intubation   Discussed

## 2024-03-15 NOTE — PROGRESS NOTES
Patient death recorded on the [facility] internal restraint log on 03/15/2024 date at 1811 time.

## 2024-03-15 NOTE — PROGRESS NOTES
PCCM:    Reviewed chart.     Discussed with family, RN and palliative care.    Noted and agree with comfort care.    Coming off high flow once clergy is present.    We will be available again to see if needed    Evens Beavers PA-C

## (undated) DEVICE — PICO 7 10CM X 30CM: Brand: PICO™ 7

## (undated) DEVICE — SUTURE PDS II SZ 0 L36IN ABSRB VLT L36MM CT-1 1/2 CIR Z346H

## (undated) DEVICE — GLOVE SURG SZ 8 L12IN FNGR THK79MIL GRN LTX FREE

## (undated) DEVICE — SUTURE MCRYL SZ 2-0 L36IN ABSRB UD L36MM CT-1 1/2 CIR Y945H

## (undated) DEVICE — HYPODERMIC SAFETY NEEDLE: Brand: MAGELLAN

## (undated) DEVICE — CANISTER, RIGID, 3000CC: Brand: MEDLINE INDUSTRIES, INC.

## (undated) DEVICE — PICO 7 DUAL 15X20CM: Brand: PICO™ 7

## (undated) DEVICE — TOURNIQUET SURGICAL LG ORANGE HEMACLEAR

## (undated) DEVICE — STOCKINETTE,IMPERVIOUS,12X48,STERILE: Brand: MEDLINE

## (undated) DEVICE — BANDAGE COMPR W6INXL12FT SMOOTH FOR LIMB EXSANG ESMARCH

## (undated) DEVICE — GLOVE ORANGE PI 7 1/2   MSG9075

## (undated) DEVICE — EXTREMITY-SFMCASU: Brand: MEDLINE INDUSTRIES, INC.

## (undated) DEVICE — SPEEDGUIDE DRILL AO: Brand: VARIAX

## (undated) DEVICE — SUTURE MCRYL SZ 3-0 L27IN ABSRB UD L19MM PS-2 3/8 CIR PRIM Y427H

## (undated) DEVICE — PAD,ABDOMINAL,5"X9",ST,LF,25/BX: Brand: MEDLINE INDUSTRIES, INC.

## (undated) DEVICE — SPONGE GZ W4XL4IN COT 12 PLY TYP VII WVN C FLD DSGN STERILE

## (undated) DEVICE — GLOVE SURG SZ 85 L12IN FNGR THK79MIL GRN LTX FREE

## (undated) DEVICE — ZIMMER® STERILE DISPOSABLE TOURNIQUET CUFF WITH PROTECTIVE SLEEVE AND PLC, DUAL PORT, SINGLE BLADDER, 18 IN. (46 CM)

## (undated) DEVICE — SUTURE VCRL SZ 0 L36IN ABSRB UD CT-1 L36MM 1/2 CIR TAPR PNT VCP946H

## (undated) DEVICE — KIT ARMOR C DRP COLLAPSIBLE AND SELF EXP TOP CVR FOR FLUOROSCOPIC

## (undated) DEVICE — SOLUTION SCRB 4% CHG RED ANTIMIC SKIN CLN PREOPERATIVE DISP

## (undated) DEVICE — HANDPIECE SET WITH COAXIAL HIGH FLOW TIP AND SUCTION TUBE: Brand: INTERPULSE

## (undated) DEVICE — SUTURE MCRYL + SZ 3-0 L27IN ABSRB UD PS1 L24MM 3/8 CIR REV MCP936H

## (undated) DEVICE — SUTURE NONABSORBABLE MONOFILAMENT 2-0 FS 18 IN ETHILON 664H

## (undated) DEVICE — SOLUTION IRRIG 1000ML STRL H2O USP PLAS POUR BTL

## (undated) DEVICE — BANDAGE COMPR W6INXL10YD ST M E WHITE/BEIGE

## (undated) DEVICE — SUTURE NONABSORBABLE MONOFILAMENT 3-0 PS-1 18 IN BLK ETHILON 1663H

## (undated) DEVICE — GLOVE ORTHO 8   MSG9480

## (undated) DEVICE — PADDING CAST W6INXL4YD ST COT RAYON MICROPLEATED HIGHLY

## (undated) DEVICE — SUTURE VCRL SZ 0 L27IN ABSRB VLT L36MM CT-1 1/2 CIR J340H

## (undated) DEVICE — SUTURE ETHLN SZ 3-0 L18IN NONABSORBABLE BLK L24MM PS-1 3/8 1663G

## (undated) DEVICE — DRESSING,GAUZE,XEROFORM,CURAD,5"X9",ST: Brand: CURAD

## (undated) DEVICE — PACK,BASIC,SIRUS,V: Brand: MEDLINE

## (undated) DEVICE — PADDING CAST W6INXL4YD NONSTERILE COT RAYON MICROPLEATED

## (undated) DEVICE — SUTURE ETHLN SZ 2-0 L18IN NONABSORBABLE BLK L19MM PS-2 PRIM 593H

## (undated) DEVICE — SOLUTION IRRIG 1000ML 0.9% SOD CHL USP POUR PLAS BTL

## (undated) DEVICE — BANDAGE,GAUZE,BULKEE II,4.5"X4.1YD,STRL: Brand: MEDLINE

## (undated) DEVICE — SUTURE VCRL SZ 0 L36IN ABSRB UD L36MM CT-1 1/2 CIR J946H

## (undated) DEVICE — BONE SCREW
Type: IMPLANTABLE DEVICE | Site: ANKLE | Status: NON-FUNCTIONAL
Brand: VARIAX
Removed: 2023-07-20

## (undated) DEVICE — SUTURE MONOCRYL 2-0 SH 27IN UND PLUS MCP417